# Patient Record
Sex: FEMALE | Race: WHITE | NOT HISPANIC OR LATINO | Employment: FULL TIME | URBAN - METROPOLITAN AREA
[De-identification: names, ages, dates, MRNs, and addresses within clinical notes are randomized per-mention and may not be internally consistent; named-entity substitution may affect disease eponyms.]

---

## 2017-01-09 ENCOUNTER — TELEPHONE (OUTPATIENT)
Dept: FAMILY MEDICINE | Facility: OTHER | Age: 27
End: 2017-01-09

## 2017-02-13 ENCOUNTER — ALLIED HEALTH/NURSE VISIT (OUTPATIENT)
Dept: FAMILY MEDICINE | Facility: OTHER | Age: 27
End: 2017-02-13
Payer: COMMERCIAL

## 2017-02-13 DIAGNOSIS — N91.2 ABSENCE OF MENSTRUATION: Primary | ICD-10-CM

## 2017-02-13 LAB — BETA HCG QUAL IFA URINE: POSITIVE

## 2017-02-13 PROCEDURE — 99207 ZZC NO CHARGE NURSE ONLY: CPT

## 2017-02-13 PROCEDURE — 84703 CHORIONIC GONADOTROPIN ASSAY: CPT | Performed by: PHYSICIAN ASSISTANT

## 2017-02-13 NOTE — MR AVS SNAPSHOT
"              After Visit Summary   2/13/2017    Valarie Talavera    MRN: [id number]           Patient Information     Date Of Birth          1990        Visit Information        Provider Department      2/13/2017 11:00 AM NL RN [facility]        Today's Diagnoses     Absence of menstruation    -  1      Care Instructions    According to your last menstrual period, you are approximately 4 weeks and 5 days pregnant.  Your estimated due date is 10/18/17.    To do list:  1. If you have not already started taking a prenatal vitamin, please start today.  2. Visit with our OB Intake nurse, Sole: this can be scheduled any time between today and the first visit with your provider. We do require that you see OB intake before your \"first OB\" visit if you choose to deliver at [facility].          Mondays- [place] (11am-6pm)       Tuesdays- [place] (9am-11am)/ [place] (1pm-3pm)       Wednesdays- [place] (9am-2pm)       Thursdays- [place] (8am-10am)    3. The first OB visit with provider of choice is to scheduled between 10 and 12 weeks: .  To make these appointments, or if you have any questions and would like to speak to your care team, you can call the clinic's main phone number:       [facility]: [phone]       [facility]: [phone]       [facility]: [phone]       [facility]: [phone]       [facility]: [phone]    Please remember, we would like to hear from you if you have any vaginal bleeding or any moderate to severe abdominal pain/cramping. You can call any of the phone numbers above and speak with an RN promptly, even if it is after clinic hours, someone will answer your call. You can also call the numbers listed in your take home folder from today's visit.     Thank you for choosing [facility], and Congratulations!    Rodney Manriquez RN                  Follow-ups after your visit        Who to contact     If you have questions " "or need follow up information about today's clinic visit or your schedule please contact [facility] directly at [phone].  Normal or non-critical lab and imaging results will be communicated to you by MyChart, letter or phone within 4 business days after the clinic has received the results. If you do not hear from us within 7 days, please contact the clinic through [employer] or phone. If you have a critical or abnormal lab result, we will notify you by phone as soon as possible.  Submit refill requests through [employer] or call your pharmacy and they will forward the refill request to us. Please allow 3 business days for your refill to be completed.          Additional Information About Your Visit        [employer] Information     [employer] lets you send messages to your doctor, view your test results, renew your prescriptions, schedule appointments and more. To sign up, go to [email or web address] . Click on \"Log in\" on the left side of the screen, which will take you to the Welcome page. Then click on \"Sign up Now\" on the right side of the page.     You will be asked to enter the access code listed below, as well as some personal information. Please follow the directions to create your username and password.     Your access code is: [email or web address]  Expires: 2017 11:19 AM     Your access code will  in 90 days. If you need help or a new code, please call your [place] clinic or [phone].        Care EveryWhere ID     This is your Care EveryWhere ID. This could be used by other organizations to access your [place] medical records  [id number]        Your Vitals Were     Last Period Breastfeeding?                2017 (Exact Date) No           Blood Pressure from Last 3 Encounters:   16 102/62   16 112/56   16 116/68    Weight from Last 3 Encounters:   16 231 lb 14.4 oz (105.2 kg)   16 224 lb 6.4 oz (101.8 kg)   16 220 lb (99.8 kg)              We " Performed the Following     Beta HCG qual IFA urine        Primary Care Provider    [facility]       No address on file        Thank you!     Thank you for choosing [facility]  for your care. Our goal is always to provide you with excellent care. Hearing back from our patients is one way we can continue to improve our services. Please take a few minutes to complete the written survey that you may receive in the mail after your visit with us. Thank you!             Your Updated Medication List - Protect others around you: Learn how to safely use, store and throw away your medicines at www.disposemymeds.org.          This list is accurate as of: 2/13/17 11:19 AM.  Always use your most recent med list.                   Brand Name Dispense Instructions for use    VITAMIN D PO      Take  by mouth.

## 2017-02-13 NOTE — PATIENT INSTRUCTIONS
"According to your last menstrual period, you are approximately 4 weeks and 5 days pregnant.  Your estimated due date is 10/18/17.    To do list:  1. If you have not already started taking a prenatal vitamin, please start today.  2. Visit with our OB Intake nurse, Sole: this can be scheduled any time between today and the first visit with your provider. We do require that you see OB intake before your \"first OB\" visit if you choose to deliver at [facility].          Mondays- [place] (11am-6pm)       Tuesdays- [place] (9am-11am)/ [place] (1pm-3pm)       Wednesdays- [place] (9am-2pm)       Thursdays- [place] (8am-10am)    3. The first OB visit with provider of choice is to scheduled between 10 and 12 weeks: .  To make these appointments, or if you have any questions and would like to speak to your care team, you can call the clinic's main phone number:       [facility]: [phone]       [facility]: [phone]       [facility]: [phone]       [facility]: [phone]       [facility]: [phone]    Please remember, we would like to hear from you if you have any vaginal bleeding or any moderate to severe abdominal pain/cramping. You can call any of the phone numbers above and speak with an RN promptly, even if it is after clinic hours, someone will answer your call. You can also call the numbers listed in your take home folder from today's visit.     Thank you for choosing [facility], and Congratulations!    Rodney Manriquez RN            "

## 2017-02-13 NOTE — PROGRESS NOTES
Valarie Talavera is a 26 year old here today for a pregnancy test.  LMP: Patient's last menstrual period was 2017 (exact date).  Wt: 0 lbs 0 oz.    Symptoms include weight gain, breast tenderness, absence of menses, nausea &/or vomiting and fatigue.    Valarie informed of positive pregnancy test results. JACINDA: 10/18/17    Educational advice given: nutrition, smoking and drugs & alcohol.    Current medications reviewed: Yes    Previous pregnancy history remarkable for: none.    Plan: schedule appointment with OB Educator and/or OB class, follow-up appointment with  of choice for pre- care, take multivitamin or pre-vicente vitamins and OB Education packet given.    She is to call back if she has any questions or concerns.  She is advised to notify a provider immediately if she experiences any severe cramping or abdominal pain or any vaginal bleeding.

## 2017-03-08 ENCOUNTER — HOSPITAL ENCOUNTER (EMERGENCY)
Facility: CLINIC | Age: 27
Discharge: HOME | End: 2017-03-09
Attending: PHYSICIAN ASSISTANT | Admitting: PHYSICIAN ASSISTANT
Payer: OTHER MISCELLANEOUS

## 2017-03-08 DIAGNOSIS — S33.5XXA LUMBAR SPRAIN, INITIAL ENCOUNTER: ICD-10-CM

## 2017-03-08 DIAGNOSIS — Z33.1 PREGNANT STATE, INCIDENTAL: ICD-10-CM

## 2017-03-08 DIAGNOSIS — M25.561 ACUTE PAIN OF RIGHT KNEE: ICD-10-CM

## 2017-03-08 PROCEDURE — 99282 EMERGENCY DEPT VISIT SF MDM: CPT

## 2017-03-08 PROCEDURE — 99282 EMERGENCY DEPT VISIT SF MDM: CPT | Performed by: PHYSICIAN ASSISTANT

## 2017-03-08 NOTE — LETTER
REPORT OF WORKABILITY    [facility] EMERGENCY DEPARTMENT  [address] [place] [address]  Phone: [phone]  Fax: [phone]      Employee Name: Valarie Talavera      : 1990     #: [id number]    Work related injury: Yes  Employer at time of injury: [employer]  Employer contact & phone: Meghna [phone]  Employed elsewhere? No  Workers' Compensation Carrier/Managed Care Plan:  Uncertain.    Today's date: 3/8/2017  Date of injury: 3/8/2017   Date of first visit: 3/8/2017     Provider Evaluation: Please fill in as needed.  Please give copy to employee for employer.    1. Diagnosis: Lumbar sprain, right knee pain, pregnancy.    2. Treatment: Rest, ice to the back area as needed, general stretching, work restrictions.    3. Medication: OTC Tylenol as needed.    NOTE: When ordering a medication, [place] Rules require Work Comp or WC on prescriptions.    4. No work from - void.    5. Return to work date: 3/9/2017       WITH RESTRICTIONS? Yes, with work restrictions: * Restricted lifting - Maximum lift in pounds: 5  DURATION OF LIMITATIONS: 2 days.      RESTRICTIONS: Unlimited unless listed.  Restrictions apply to home and leisure also.  If work restrictions is not available, the employee is totally disabled.    Next appointment: 2 day.    Maximum Medical Improvement (Date): uncertain.  Any Permanent Partial Disability? Deferred to future exam/consult.  Provider comments: She is pregnant, but does have fetal cardiac activity noted during bedside US.    Medical Examiner Name:  Pérez Martinez PA-C   License or registration: [place] [id number]

## 2017-03-08 NOTE — ED AVS SNAPSHOT
[facility] Emergency Department    [address] [place] [address]    Phone:  [phone]    Fax:  [phone]                                       Valarie Talavera   MRN: [id number]    Department:  [facility] Emergency Department   Date of Visit:  3/8/2017           Patient Information     Date Of Birth          1990        Your diagnoses for this visit were:     Lumbar sprain, initial encounter     Acute pain of right knee     Pregnant state, incidental        You were seen by Pérez Martinez PA-C.      Follow-up Information     Follow up with Luis Crisostomo MD In 1 day.    Specialty:  Family Practice    Why:  For low back pain recheck    Contact information:    [facility]  [address] [place] [id number] [phone]          Discharge Instructions       1. Please rest and follow your work restrictions.  2. Please follow the 'pelvic rest' restrictions for the next 48 hours.  This means that you should not put anything in the vagina during this time.  No intercourse.  3.  It is okay to take Tylenol 500-1000 mg every 6 hours as needed for discomfort.   4.  Ice your back for 15 minutes every 1-2 hours as needed.   5.  Please see Dr. Crisostomo for a recheck visit this Friday at 10:15 AM in the [facility].    Future Appointments        Provider Department Dept Phone Center    3/10/2017 10:15 AM Luis Crisostomo MD, MD [facility] [phone] [place]      24 Hour Appointment Hotline       To make an appointment at any [facility], call [phone] ([phone]). If you don't have a family doctor or clinic, we will help you find one. [facility] are conveniently located to serve the needs of you and your family.             Review of your medicines      Our records show that you are taking the medicines listed below. If these are incorrect, please call your family doctor or clinic.        Dose / Directions Last  "dose taken    PRENATAL VITAMINS PO   Dose:  1 tablet        Take 1 tablet by mouth daily   Refills:  0        VITAMIN D PO   Dose:  500 Units        Take 500 Units by mouth 2 times daily   Refills:  0                Orders Needing Specimen Collection     None      Pending Results     No orders found for last 3 day(s).            Pending Culture Results     No orders found for last 3 day(s).            Thank you for choosing [facility]       Thank you for choosing [facility] for your care. Our goal is always to provide you with excellent care. Hearing back from our patients is one way we can continue to improve our services. Please take a few minutes to complete the written survey that you may receive in the mail after you visit with us. Thank you!        [employer] Information     [employer] lets you send messages to your doctor, view your test results, renew your prescriptions, schedule appointments and more. To sign up, go to [email or web address] . Click on \"Log in\" on the left side of the screen, which will take you to the Welcome page. Then click on \"Sign up Now\" on the right side of the page.     You will be asked to enter the access code listed below, as well as some personal information. Please follow the directions to create your username and password.     Your access code is: [email or web address]  Expires: 2017 11:19 AM     Your access code will  in 90 days. If you need help or a new code, please call your [place] clinic or [phone].        Care EveryWhere ID     This is your Care EveryWhere ID. This could be used by other organizations to access your [facility] medical records  [id number]        After Visit Summary       This is your record. Keep this with you and show to your community pharmacist(s) and doctor(s) at your next visit.                  "

## 2017-03-08 NOTE — ED AVS SNAPSHOT
[facility] Emergency Department    [address] [place] [address]    Phone:  [phone]    Fax:  [phone]                                       Valarie Talavera   MRN: [id number]    Department:  [facility] Emergency Department   Date of Visit:  3/8/2017           After Visit Summary Signature Page     I have received my discharge instructions, and my questions have been answered. I have discussed any challenges I see with this plan with the nurse or doctor.    ..........................................................................................................................................  Patient/Patient Representative Signature      ..........................................................................................................................................  Patient Representative Print Name and Relationship to Patient    ..................................................               ................................................  Date                                            Time    ..........................................................................................................................................  Reviewed by Signature/Title    ...................................................              ..............................................  Date                                                            Time

## 2017-03-09 VITALS
HEIGHT: 68 IN | WEIGHT: 246 LBS | TEMPERATURE: 97.8 F | DIASTOLIC BLOOD PRESSURE: 67 MMHG | RESPIRATION RATE: 18 BRPM | HEART RATE: 74 BPM | BODY MASS INDEX: 37.28 KG/M2 | OXYGEN SATURATION: 99 % | SYSTOLIC BLOOD PRESSURE: 132 MMHG

## 2017-03-09 NOTE — ED PROVIDER NOTES
"  History     Chief Complaint   Patient presents with     Back Pain     HPI  Valarie Talavera is a 26 year old female who presents for evaluation of a Worker's Compensation injury that occurred at 2200 this evening. 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 He flung his body against her which caused both of them to fall backward onto the tile floor. She fell onto her tailbone and shoulder per her report. She did notice low back and right knee discomfort after the incident. Since then she has had some mild lower abdominal discomfort as well. No cramping or contractions. No vaginal bleeding. She does note that she is currently 8 weeks and 1 day gestation pregnant based on LMP. She has had no complications of pregnancy up to this point. She reports pain in her low back as 7 on a scale of 10 when she is moving. Otherwise, she states that it \"comes and goes \". She has some radiation into the bilateral buttock area. No lower extremity numbness or tingling. No lower extremity weakness. No loss of bowel/bladder function. She has never had a significant low back injury in the past.      Patient's last menstrual period was 2017 (exact date).     Obstetric History       T1      TAB0   SAB1   E0   M0   L1       # Outcome Date GA Lbr Mook/2nd Weight Sex Delivery Anes PTL Lv   3 Current            2 SAB 02/02/15 6w0d          1 Term 14 40w0d  3.742 kg (8 lb 4 oz) F   N Y               I have reviewed the Medications, Allergies, Past Medical and Surgical History, and Social History in the [employer] system.    Past Medical History   Diagnosis Date     Anxiety      Depression with anxiety      Depressive disorder      HSIL on Pap smear of cervix 2016     colp scheduled 16     Low blood pressure      Vitamin D deficiency         Patient Active Problem List   Diagnosis     IUD (intrauterine device) in place     Vitamin D deficiency " "    HSIL on Pap smear of cervix        Past Surgical History   Procedure Laterality Date     No history of surgery          No current facility-administered medications for this encounter.      Current Outpatient Prescriptions   Medication     Prenatal Multivit-Min-Fe-FA (PRENATAL VITAMINS PO)     Cholecalciferol (VITAMIN D PO)           Allergies   Allergen Reactions     Zithromax [Azithromycin] Other (See Comments)     Seizure         Review of Systems   All other systems reviewed and are negative.      Physical Exam   BP: 132/67  Pulse: 96  Temp: 100  F (37.8  C)  Resp: 20  Height: 172.7 cm (5' 8\")  Weight: 111.6 kg (246 lb)  SpO2: 97 %  Physical Exam  Generally healthy appearing female in NAD who is active and non-toxic appearing.   Skin:  No rashes or lesions are noted on inspection of the torso, face, and upper extremities.   Heart:  RRR with normal S1 and S2.  No S3 or S4.  No murmur, rub, gallop, or click.  PMI is nondisplaced.   Lungs:  CTA bilaterally without wheezes, rales, or rhonchi.  Good breath sounds heard throughout all lung fields.  Tympanitic to percussion with no areas of dullness.   Chest :  No chest wall deformities or tenderness.   Abdomen: Positive bowel sounds in all four quadrants.  Mild tenderness to palpation over the suprapubic area.  No rebound and no guarding.  No hepatosplenomegaly.  No masses.   Lumbosacral spine area reveals no mass but there is tenderness of the paravertebral muscles. Full but painful lumbosacral range of motion is noted. Straight leg raise is negative at 70 degrees on both sides. DTR's, motor strength and sensation normal, including heel and toe gait.  Peripheral pulses are palpable. Hips have full range of motion without pain.   Knee is normal to inspection and palpation without evidence of erythema, warmth, or discoloration. ROM is full without crepitus.  Strength is equal and appropriate bilaterally. No tenderness to palpation along the joint line, posterior " knee, patella, and collateral ligaments.  No ligamentous instability with testing of the MCL, LCL, ACL, and PCL ligaments.  Gage's testing for meniscal injury is negative.  Patellar apprehension negative.    Distal pulses 2+ bilaterally.  Sensation intact to light touch.  Opposite knee exam is normal.       Given her early gestation, we would not be able to hear fetal heart tones by Doptone. I did perform an informal bedside ultrasound with the transabdominal approach. I was able to appreciate cardiac activity at about 140 beats per minutes. This was reassuring.       ED Course     ED Course     Procedures             Critical Care time:  none               Labs Ordered and Resulted from Time of ED Arrival Up to the Time of Departure from the ED - No data to display    Assessments & Plan (with Medical Decision Making)     Lumbar sprain, initial encounter  Acute pain of right knee  Pregnant state, incidental     26 year old female currently 8 weeks 1 day gestation pregnant based on LMP presents for evaluation of a Worker's Compensation injury that occurred this evening just prior to level. She was helping a resident when she had acute onset seizure. He fell against the patient, and they fell backwards onto the floor with him landing on top of her.  She primarily noted low back discomfort and right knee discomfort immediately afterwards. She has had some off and on discomfort in the lower abdomen as well. No vaginal bleeding. Exam shows significant lower back discomfort upon palpation.  Knee exam was essentially normal. Sodium minor lower abdominal discomfort. Bedside ultrasound with noted fetal heart tones which was reassuring as noted above. Given that she does not have any current contractions, does not have vaginal bleeding, and she is so early in her pregnancy, formal ultrasound was not warranted at this time.   We did discuss that there is the potential for SAB with any lower abdominal trauma. We discussed  pelvic rest and observation for further symptoms.  She was comfortable with this.  We did not monitor her fetal heart tones for an extended period of time given her early gestational age and inability to find fetal heart tones by Doptone. I placed her on work restrictions of no lifting over 5 pounds. Recheck appointment with her PCP and OB MAleksD., Dr. Crisostomo, in the [facility] was set up for her 1.5 days from now. She should return to the ED prior to then if her symptoms are worsening or changing, especially if she has any vaginal bleeding. It is okay to take Tylenol as needed for discomfort. Increase clear fluids.  The patient was in agreement with this plan and was suitable for discharge. Please see the work ability letter for further details.      I have reviewed the nursing notes.    I have reviewed the findings, diagnosis, plan and need for follow up with the patient.    Discharge Medication List as of 3/9/2017 12:38 AM          Final diagnoses:   Lumbar sprain, initial encounter   Acute pain of right knee   Pregnant state, incidental       Disclaimer: This note consists of symbols derived from keyboarding, dictation and/or voice recognition software. As a result, there may be errors in the script that have gone undetected. Please consider this when interpreting information found in this chart.    3/8/2017   Pérez Martinez PA-C   [facility] EMERGENCY DEPARTMENT     Pérez Martinez PA-C  03/09/17 0100

## 2017-03-09 NOTE — DISCHARGE INSTRUCTIONS
1. Please rest and follow your work restrictions.  2. Please follow the 'pelvic rest' restrictions for the next 48 hours.  This means that you should not put anything in the vagina during this time.  No intercourse.  3.  It is okay to take Tylenol 500-1000 mg every 6 hours as needed for discomfort.   4.  Ice your back for 15 minutes every 1-2 hours as needed.   5.  Please see Dr. Crisostomo for a recheck visit this Friday at 10:15 AM in the [facility].

## 2017-03-09 NOTE — PROGRESS NOTES
SUBJECTIVE:                                                    Valarie Talavera is a 26 year old female who presents to clinic today for the following health issues:      ED/UC Followup:    Facility:  [facility]  Date of visit: 3/8/17  Reason for visit: Lumbar sprain, acute pain of right knee, pregnant state  Current Status: more abdominal now not so much low back, pt worried about infection     Pt here for ER f/u.  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  She's nearly 9 weeks pregnant.  Everything looked OK at the ER.      Pt has had a reddish-brown discharge since her ER visit.  Cramping pain as well.  Denies bleeding.  Some itching as well.      States that her back and knee pain are improved.        Problem list and histories reviewed & adjusted, as indicated.  Additional history: as documented    Current Outpatient Prescriptions   Medication Sig Dispense Refill     Prenatal Multivit-Min-Fe-FA (PRENATAL VITAMINS PO) Take 1 tablet by mouth daily       Cholecalciferol (VITAMIN D PO) Take 500 Units by mouth 2 times daily        Recent Labs   Lab Test  08/30/16   1153  02/02/16   1345  07/24/15   1801   LDL   --   74   --    HDL   --   29*   --    TRIG   --   233*   --    ALT   --    --   22   CR   --    --   0.62   GFRESTIMATED   --    --   >90  Non  GFR Calc     GFRESTBLACK   --    --   >90   GFR Calc     POTASSIUM   --    --   3.7   TSH  0.46   --    --       BP Readings from Last 3 Encounters:   03/10/17 100/56   03/08/17 132/67   12/30/16 102/62    Wt Readings from Last 3 Encounters:   03/10/17 242 lb 6.4 oz (110 kg)   03/08/17 246 lb (111.6 kg)   12/30/16 231 lb 14.4 oz (105.2 kg)                 Reviewed and updated as needed this visit by clinical staff       Reviewed and updated as needed this visit by Provider         ROS:  Constitutional, HEENT, cardiovascular, pulmonary, gi and gu systems are negative, except as otherwise noted.  " Feels warm, c/w pregnancy.  Not much nausea any longer.  Still with breast tenderness, bloating.      OBJECTIVE:                                                    /56 (BP Location: Left arm, Patient Position: Chair, Cuff Size: Adult Large)  Pulse 96  Temp 97.9  F (36.6  C) (Temporal)  Resp 16  Ht 5' 7\" (1.702 m)  Wt 242 lb 6.4 oz (110 kg)  LMP 01/11/2017 (Exact Date)  BMI 37.97 kg/m2  Body mass index is 37.97 kg/(m^2).  GENERAL: healthy, alert and no distress  NECK: no adenopathy, no asymmetry, masses, or scars and thyroid normal to palpation  ABDOMEN: soft, nontender, no hepatosplenomegaly, no masses and bowel sounds normal   (female): normal female external genitalia, normal urethral meatus , vaginal mucosa pink, moist, well rugated, vaginal discharge - moderate, yellow, thin and malodorous and normal cervix, adnexae, and uterus without masses.  MS: no gross musculoskeletal defects noted, no edema    Diagnostic Test Results:  Results for orders placed or performed in visit on 03/10/17   *UA reflex to Microscopic and Culture ([facility] (except [place] and [place])   Result Value Ref Range    Color Urine Yellow     Appearance Urine Slightly Cloudy     Glucose Urine Negative NEG mg/dL    Bilirubin Urine Negative NEG    Ketones Urine Negative NEG mg/dL    Specific Gravity Urine 1.025 1.003 - 1.035    Blood Urine Trace (A) NEG    pH Urine 6.0 5.0 - 7.0 pH    Protein Albumin Urine Negative NEG mg/dL    Urobilinogen Urine 0.2 0.2 - 1.0 EU/dL    Nitrite Urine Negative NEG    Leukocyte Esterase Urine Negative NEG    Source Midstream Urine    Urine Microscopic   Result Value Ref Range    WBC Urine 2-5 (A) 0 - 2 /HPF    RBC Urine O - 2 0 - 2 /HPF    Squamous Epithelial /LPF Urine Many (A) FEW /LPF    Bacteria Urine Many (A) NEG /HPF    Mucous Urine Present (A) NEG /LPF   Wet prep   Result Value Ref Range    Specimen Description Vagina     Wet Prep (A)      Clue cells seen  No " Trichomonas seen  No yeast seen      Micro Report Status FINAL 03/10/2017         ASSESSMENT/PLAN:                                                        ICD-10-CM    1. Back pain affecting pregnancy in first trimester O26.891     M54.9    2. Vaginal discharge N89.8 Wet prep     *UA reflex to Microscopic and Culture ([facility] (except [place] and [place])     Urine Microscopic   3. BV (bacterial vaginosis) N76.0 clindamycin (CLEOCIN) 2 % cream    B96.89    4. Pelvic cramping in antepartum period O26.899 Wet prep    R10.2 *UA reflex to Microscopic and Culture ([facility] (except [place] and [place])   5. Encounter for supervision of other normal pregnancy in first trimester Z34.81 *UA reflex to Microscopic and Culture ([facility] (except [place] and [place])   6. Screening for malignant neoplasm of cervix Z12.4 Pap imaged thin layer screen reflex to HPV if ASCUS - recommend age 25 - 29     1.  This has basically resolved.  Reassured pt regarding this.  No e/o miscarriage at this time.  Continue supportive care.  Didn't require analgesics or anything else right now.  2,3.  Will treat BV (treating vaginally due to potential risk of flagyl in first trimester).  Watch for any worsening, bleeding, etc.  4.  Likely secondary to BV.  Encouraged fluid, watch for e/o threatening miscarriage.  Bedside u/s showed intrauterine pregnancy today.  5.  Encouraged pt to get in for formal OB intake.  Will get additional testing at that time.  6.  Pap obtained.  Await results.          Patient Instructions   Thank you for visiting [facility]    Take the vaginal cream for your infection.  Let me know if not improving.    If you have bleeding, let me know.    See OB intake to formally start your OB care here.    We'll let you know your pap results as soon as we can.     Please see me in 1-4 weeks for OB visit.       If you had  imaging scheduled please refer to your radiology prep sheet.    Appointment    Date_______________     Time_____________    Day:   M TU W TH F    With____________________________    Location_________________________    If you need medication refills, please contact your pharmacy 3 days before your prescriptions runs out. If you are out of refills, your pharmacy will contact contact the clinic.    Contact us or return if questions or concerns.     -Your Care Team:  MD Shi Frazier PA-C Folake Falaki, MD Anoshirvan Mazhari, MD Kelly White, JACKY    General information about your clinic      Clinic hours:     Lab hours:  Phone [phone]  Monday 7:30 am-7 pm    Monday 8:30 am-6:30 pm  Tuesday-Friday 7:30 am-5 pm   Tuesday-Friday 8:30 am-4:30 pm    Pharmacy hours:  Phone [phone]  Monday 8:30 am-7pm  Tuesday-Friday 8:30am-6 pm                                       Mychart assistance [phone]        We would like to hear from you, how was your visit today?    Daisy Vazquez  Patient Information Supervisor   Patient Care Supervisor  [facility], and [facility]  [phone]        Luis Crisostomo MD, MD  [facility]

## 2017-03-09 NOTE — ED NOTES
Patient c/o low back pain and right knee pain. 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 She is 8 weeks pregnant.

## 2017-03-10 ENCOUNTER — OFFICE VISIT (OUTPATIENT)
Dept: FAMILY MEDICINE | Facility: OTHER | Age: 27
End: 2017-03-10
Payer: COMMERCIAL

## 2017-03-10 VITALS
SYSTOLIC BLOOD PRESSURE: 100 MMHG | RESPIRATION RATE: 16 BRPM | WEIGHT: 242.4 LBS | TEMPERATURE: 97.9 F | BODY MASS INDEX: 38.04 KG/M2 | HEART RATE: 96 BPM | DIASTOLIC BLOOD PRESSURE: 56 MMHG | HEIGHT: 67 IN

## 2017-03-10 DIAGNOSIS — Z12.4 SCREENING FOR MALIGNANT NEOPLASM OF CERVIX: ICD-10-CM

## 2017-03-10 DIAGNOSIS — N89.8 VAGINAL DISCHARGE: ICD-10-CM

## 2017-03-10 DIAGNOSIS — R10.2 PELVIC CRAMPING IN ANTEPARTUM PERIOD: ICD-10-CM

## 2017-03-10 DIAGNOSIS — O26.899 PELVIC CRAMPING IN ANTEPARTUM PERIOD: ICD-10-CM

## 2017-03-10 DIAGNOSIS — M54.9 BACK PAIN AFFECTING PREGNANCY IN FIRST TRIMESTER: Primary | ICD-10-CM

## 2017-03-10 DIAGNOSIS — B96.89 BV (BACTERIAL VAGINOSIS): ICD-10-CM

## 2017-03-10 DIAGNOSIS — O99.891 BACK PAIN AFFECTING PREGNANCY IN FIRST TRIMESTER: Primary | ICD-10-CM

## 2017-03-10 DIAGNOSIS — Z34.81 ENCOUNTER FOR SUPERVISION OF OTHER NORMAL PREGNANCY IN FIRST TRIMESTER: ICD-10-CM

## 2017-03-10 DIAGNOSIS — N76.0 BV (BACTERIAL VAGINOSIS): ICD-10-CM

## 2017-03-10 LAB
ALBUMIN UR-MCNC: NEGATIVE MG/DL
APPEARANCE UR: ABNORMAL
BACTERIA #/AREA URNS HPF: ABNORMAL /HPF
BILIRUB UR QL STRIP: NEGATIVE
COLOR UR AUTO: YELLOW
GLUCOSE UR STRIP-MCNC: NEGATIVE MG/DL
HGB UR QL STRIP: ABNORMAL
KETONES UR STRIP-MCNC: NEGATIVE MG/DL
LEUKOCYTE ESTERASE UR QL STRIP: NEGATIVE
MICRO REPORT STATUS: ABNORMAL
MUCOUS THREADS #/AREA URNS LPF: PRESENT /LPF
NITRATE UR QL: NEGATIVE
NON-SQ EPI CELLS #/AREA URNS LPF: ABNORMAL /LPF
PH UR STRIP: 6 PH (ref 5–7)
RBC #/AREA URNS AUTO: ABNORMAL /HPF (ref 0–2)
SP GR UR STRIP: 1.02 (ref 1–1.03)
SPECIMEN SOURCE: ABNORMAL
URN SPEC COLLECT METH UR: ABNORMAL
UROBILINOGEN UR STRIP-ACNC: 0.2 EU/DL (ref 0.2–1)
WBC #/AREA URNS AUTO: ABNORMAL /HPF (ref 0–2)
WET PREP SPEC: ABNORMAL

## 2017-03-10 PROCEDURE — G0145 SCR C/V CYTO,THINLAYER,RESCR: HCPCS | Performed by: FAMILY MEDICINE

## 2017-03-10 PROCEDURE — 87624 HPV HI-RISK TYP POOLED RSLT: CPT | Performed by: FAMILY MEDICINE

## 2017-03-10 PROCEDURE — 87210 SMEAR WET MOUNT SALINE/INK: CPT | Performed by: FAMILY MEDICINE

## 2017-03-10 PROCEDURE — 81001 URINALYSIS AUTO W/SCOPE: CPT | Performed by: FAMILY MEDICINE

## 2017-03-10 PROCEDURE — 99214 OFFICE O/P EST MOD 30 MIN: CPT | Performed by: FAMILY MEDICINE

## 2017-03-10 PROCEDURE — G0124 SCREEN C/V THIN LAYER BY MD: HCPCS | Performed by: FAMILY MEDICINE

## 2017-03-10 RX ORDER — CLINDAMYCIN PHOSPHATE 20 MG/G
1 CREAM VAGINAL AT BEDTIME
Qty: 40 G | Refills: 0 | Status: SHIPPED | OUTPATIENT
Start: 2017-03-10 | End: 2017-03-17

## 2017-03-10 ASSESSMENT — PAIN SCALES - GENERAL: PAINLEVEL: MODERATE PAIN (5)

## 2017-03-10 NOTE — LETTER
December 13, 2017    Valarie Talavera  [address] [place] [address]    Dear ,     You recently requested your PAP smear results as well as some information on the FELIBERTO program. The result shows ASCUS or Atypical Squamous Cells of Undetermined Significance. This indicates a mild change only    We also tested your sample for the presence of the HPV (Human Papillomavirus). Your sample was positive for HPV. There are many types of HPV, but we test pap samples for the high risk types. HPV can be the cause of an abnormal Pap smear result.  The high risk types of HPV can also be related to the potential development of cervical cancer if not monitored and/or treated appropriately    Because of this, we need to do further testing which was already discussed with you.  It was recommended that you schedule a colposcopy. Colposcopy is a way for your doctor to use a special magnifying device to look at your vulva, vagina, and cervix. If a problem is seen during colposcopy, a small sample of tissue may be collected for laboratory testing (biopsy). The exam and test will help determine the reason for your abnormal pap smear.    We understand that with limited or no insurance this can be difficult to pay for so we are including information on the FELIBERTO Program.  The Feliberto Screening Programs help keep [facility] healthy through screening and early detection of breast, cervical and colorectal cancers. Screenings are provided at participating locations free of charge to people who qualify.    Please contact them at [phone] or apply on their website,   [email or web address]/divs/healthimprovement/working-together/who-we-are/feliberto.html    Schedule this for a time when you are not due to have a period (if having regular menstrual bleeding). You can take an over the counter pain reliever 1 hour before your colposcopy. Nothing in the vagina for 24 hours before your colposcopy (no sex, douches, vaginal  medications or lubricants).     If you have additional questions regarding this result, please call the Pap nurse at [phone].     Sincerely,  Luis Crisostomo MD/aileen

## 2017-03-10 NOTE — PATIENT INSTRUCTIONS
Thank you for visiting [facility]    Take the vaginal cream for your infection.  Let me know if not improving.    If you have bleeding, let me know.    See OB intake to formally start your OB care here.    We'll let you know your pap results as soon as we can.     Please see me in 1-4 weeks for OB visit.       If you had imaging scheduled please refer to your radiology prep sheet.    Appointment    Date_______________     Time_____________    Day:   M TU W TH F    With____________________________    Location_________________________    If you need medication refills, please contact your pharmacy 3 days before your prescriptions runs out. If you are out of refills, your pharmacy will contact contact the clinic.    Contact us or return if questions or concerns.     -Your Care Team:  MD Shi Frazier PA-C Folake Falaki, MD Anoshirvan Mazhari, MD Kelly White, JACKY    General information about your clinic      Clinic hours:     Lab hours:  Phone [phone]  Monday 7:30 am-7 pm    Monday 8:30 am-6:30 pm  Tuesday-Friday 7:30 am-5 pm   Tuesday-Friday 8:30 am-4:30 pm    Pharmacy hours:  Phone [phone]  Monday 8:30 am-7pm  Tuesday-Friday 8:30am-6 pm                                       Mychart assistance [phone]        We would like to hear from you, how was your visit today?    Daisy Vazquez  Patient Information Supervisor   Patient Care Supervisor  [facility] [place], and [facility], and [facility]  [phone]

## 2017-03-10 NOTE — LETTER
November 1, 2017      Valarie ABERNATHY Sadaf  [address] [facility] [address]    Dear MsCindySadaf,      At [facility], your health and wellness is our primary concern. That is why we are following up on an abnormal pap from 3/10/17, which was reported as ASCUS pap with positive HPV. Dr. Luna had recommended that you have a Colposcopy completed after your postpartum check.     It is important to complete the follow up that your provider has suggested for you to ensure that there are no worsening changes which may, over time, develop into cancer.      Please call [phone] for [place] or [phone] for [place] to schedule an appointment for a Colposcopyat your earliest convenience. If you have questions or concerns, please call the clinic and we will be happy to assist you.    Thank you for choosing [facility]!    Sincerely,      Katerin Wall, BSN, RN, Pap Tracking Nurse   Working with Dr. Luna and Dr. Crisostomo

## 2017-03-10 NOTE — MR AVS SNAPSHOT
After Visit Summary   3/10/2017    Valarie Talavera    MRN: [id number]           Patient Information     Date Of Birth          1990        Visit Information        Provider Department      3/10/2017 10:15 AM Luis Crisostomo MD [facility]        Today's Diagnoses     Vaginal discharge    -  1    Screening for malignant neoplasm of cervix        Need for HPV vaccine        Pelvic cramping in antepartum period        Encounter for supervision of other normal pregnancy in first trimester        BV (bacterial vaginosis)          Care Instructions    Thank you for visiting [facility]    Take the vaginal cream for your infection.  Let me know if not improving.    If you have bleeding, let me know.    See OB intake to formally start your OB care here.    We'll let you know your pap results as soon as we can.     Please see me in 1-4 weeks for OB visit.       If you had imaging scheduled please refer to your radiology prep sheet.    Appointment    Date_______________     Time_____________    Day:   M TU W TH F    With____________________________    Location_________________________    If you need medication refills, please contact your pharmacy 3 days before your prescriptions runs out. If you are out of refills, your pharmacy will contact contact the clinic.    Contact us or return if questions or concerns.     -Your Care Team:  MD Shi Frazier PA-C Folake Falaki, MD Anoshirvan Mazhari, MD Kelly White, JACKY    General information about your clinic      Clinic hours:     Lab hours:  Phone [phone]  Monday 7:30 am-7 pm    Monday 8:30 am-6:30 pm  Tuesday-Friday 7:30 am-5 pm   Tuesday-Friday 8:30 am-4:30 pm    Pharmacy hours:  Phone [phone]  Monday 8:30 am-7pm  Tuesday-Friday 8:30am-6 pm                                       Mychart assistance [phone]        We would like to hear from you, how was your visit today?    Daisy  "Ollie Vazquez  Patient Information Supervisor   Patient Care Supervisor  [facility] [place], and [facility] [place], and [facility]  [phone]          Follow-ups after your visit        Who to contact     If you have questions or need follow up information about today's clinic visit or your schedule please contact [facility] directly at [phone].  Normal or non-critical lab and imaging results will be communicated to you by Droplethart, letter or phone within 4 business days after the clinic has received the results. If you do not hear from us within 7 days, please contact the clinic through Droplethart or phone. If you have a critical or abnormal lab result, we will notify you by phone as soon as possible.  Submit refill requests through cisimple or call your pharmacy and they will forward the refill request to us. Please allow 3 business days for your refill to be completed.          Additional Information About Your Visit        [facility] Information     cisimple lets you send messages to your doctor, view your test results, renew your prescriptions, schedule appointments and more. To sign up, go to [email or web address] . Click on \"Log in\" on the left side of the screen, which will take you to the Welcome page. Then click on \"Sign up Now\" on the right side of the page.     You will be asked to enter the access code listed below, as well as some personal information. Please follow the directions to create your username and password.     Your access code is: [email or web address]  Expires: 2017 11:19 AM     Your access code will  in 90 days. If you need help or a new code, please call your [facility] clinic or [phone].        Care EveryWhere ID     This is your Care EveryWhere ID. This could be used by other organizations to access your [facility] medical records  [id number]        Your Vitals Were     Pulse Temperature Respirations Height " "Last Period BMI (Body Mass Index)    96 97.9  F (36.6  C) (Temporal) 16 5' 7\" (1.702 m) 01/11/2017 (Exact Date) 37.97 kg/m2       Blood Pressure from Last 3 Encounters:   03/10/17 100/56   03/08/17 132/67   12/30/16 102/62    Weight from Last 3 Encounters:   03/10/17 242 lb 6.4 oz (110 kg)   03/08/17 246 lb (111.6 kg)   12/30/16 231 lb 14.4 oz (105.2 kg)              We Performed the Following     *UA reflex to Microscopic and Culture ([facility] [place] [facility] (except [place] and [place])     Pap imaged thin layer screen reflex to HPV if ASCUS - recommend age 25 - 29     Wet prep          Today's Medication Changes          These changes are accurate as of: 3/10/17 11:24 AM.  If you have any questions, ask your nurse or doctor.               Start taking these medicines.        Dose/Directions    clindamycin 2 % cream   Commonly known as:  CLEOCIN   Used for:  BV (bacterial vaginosis)   Started by:  Luis Crisostomo MD        Dose:  1 applicator   Place 1 applicator vaginally At Bedtime for 7 days   Quantity:  40 g   Refills:  0            Where to get your medicines      These medications were sent to [facility] - [address]   [address] [place] [address]     Phone:  [phone]     clindamycin 2 % cream                Primary Care Provider    [facility]       No address on file        Thank you!     Thank you for choosing [facility]  for your care. Our goal is always to provide you with excellent care. Hearing back from our patients is one way we can continue to improve our services. Please take a few minutes to complete the written survey that you may receive in the mail after your visit with us. Thank you!             Your Updated Medication List - Protect others around you: Learn how to safely use, store and throw away your medicines at www.disposemymeds.org.          This list is accurate as of: " 3/10/17 11:24 AM.  Always use your most recent med list.                   Brand Name Dispense Instructions for use    clindamycin 2 % cream    CLEOCIN    40 g    Place 1 applicator vaginally At Bedtime for 7 days       PRENATAL VITAMINS PO      Take 1 tablet by mouth daily       VITAMIN D PO      Take 500 Units by mouth 2 times daily

## 2017-03-10 NOTE — NURSING NOTE
"Chief Complaint   Patient presents with     ER F/U     Panel Management     hpv, flu shot, pap, colposcopy       Initial /56 (BP Location: Left arm, Patient Position: Chair, Cuff Size: Adult Large)  Pulse 96  Temp 97.9  F (36.6  C) (Temporal)  Resp 16  Ht 5' 7\" (1.702 m)  Wt 242 lb 6.4 oz (110 kg)  LMP 01/11/2017 (Exact Date)  BMI 37.97 kg/m2 Estimated body mass index is 37.97 kg/(m^2) as calculated from the following:    Height as of this encounter: 5' 7\" (1.702 m).    Weight as of this encounter: 242 lb 6.4 oz (110 kg).  Medication Reconciliation: fariha Larsen, CMA    "

## 2017-03-10 NOTE — LETTER
[facility]  [address] [place] [facility] [address]  Phone: [phone]    March 10, 2017        Valarie Talavera  [address] [place] [address]          To whom it may concern:    RE: Valarie ABERNATHY Sadaf    Patient was seen and treated today at our clinic and missed work.  Patient may return to work 3/10/2017 with the following:  No working or lifting restrictions    Please contact me for questions or concerns.      Sincerely,        Luis Crisostomo MD

## 2017-03-11 PROBLEM — Z34.81 ENCOUNTER FOR SUPERVISION OF OTHER NORMAL PREGNANCY IN FIRST TRIMESTER: Status: ACTIVE | Noted: 2017-03-11

## 2017-03-16 LAB
COPATH REPORT: ABNORMAL
PAP: ABNORMAL

## 2017-03-17 LAB
FINAL DIAGNOSIS: ABNORMAL
HPV HR 12 DNA CVX QL NAA+PROBE: POSITIVE
HPV16 DNA SPEC QL NAA+PROBE: NEGATIVE
HPV18 DNA SPEC QL NAA+PROBE: NEGATIVE
SPECIMEN DESCRIPTION: ABNORMAL

## 2017-03-22 ENCOUNTER — PRENATAL OFFICE VISIT (OUTPATIENT)
Dept: FAMILY MEDICINE | Facility: OTHER | Age: 27
End: 2017-03-22
Payer: COMMERCIAL

## 2017-03-22 ENCOUNTER — RADIANT APPOINTMENT (OUTPATIENT)
Dept: ULTRASOUND IMAGING | Facility: OTHER | Age: 27
End: 2017-03-22
Attending: FAMILY MEDICINE
Payer: COMMERCIAL

## 2017-03-22 VITALS — WEIGHT: 245 LBS | BODY MASS INDEX: 38.37 KG/M2

## 2017-03-22 DIAGNOSIS — Z34.80 OTHER NORMAL PREGNANCY, NOT FIRST: ICD-10-CM

## 2017-03-22 DIAGNOSIS — Z34.80 OTHER NORMAL PREGNANCY, NOT FIRST: Primary | ICD-10-CM

## 2017-03-22 LAB
ABO + RH BLD: NORMAL
ABO + RH BLD: NORMAL
BLD GP AB SCN SERPL QL: NORMAL
BLOOD BANK CMNT PATIENT-IMP: NORMAL
ERYTHROCYTE [DISTWIDTH] IN BLOOD BY AUTOMATED COUNT: 14.1 % (ref 10–15)
HCT VFR BLD AUTO: 36.1 % (ref 35–47)
HGB BLD-MCNC: 12.4 G/DL (ref 11.7–15.7)
MCH RBC QN AUTO: 31 PG (ref 26.5–33)
MCHC RBC AUTO-ENTMCNC: 34.3 G/DL (ref 31.5–36.5)
MCV RBC AUTO: 90 FL (ref 78–100)
PLATELET # BLD AUTO: 282 10E9/L (ref 150–450)
RBC # BLD AUTO: 4 10E12/L (ref 3.8–5.2)
SPECIMEN EXP DATE BLD: NORMAL
WBC # BLD AUTO: 13.1 10E9/L (ref 4–11)

## 2017-03-22 PROCEDURE — 86850 RBC ANTIBODY SCREEN: CPT | Performed by: FAMILY MEDICINE

## 2017-03-22 PROCEDURE — 86762 RUBELLA ANTIBODY: CPT | Performed by: FAMILY MEDICINE

## 2017-03-22 PROCEDURE — 87340 HEPATITIS B SURFACE AG IA: CPT | Performed by: FAMILY MEDICINE

## 2017-03-22 PROCEDURE — 76801 OB US < 14 WKS SINGLE FETUS: CPT

## 2017-03-22 PROCEDURE — 86780 TREPONEMA PALLIDUM: CPT | Performed by: FAMILY MEDICINE

## 2017-03-22 PROCEDURE — 87389 HIV-1 AG W/HIV-1&-2 AB AG IA: CPT | Performed by: FAMILY MEDICINE

## 2017-03-22 PROCEDURE — 86901 BLOOD TYPING SEROLOGIC RH(D): CPT | Performed by: FAMILY MEDICINE

## 2017-03-22 PROCEDURE — 86900 BLOOD TYPING SEROLOGIC ABO: CPT | Performed by: FAMILY MEDICINE

## 2017-03-22 PROCEDURE — 36415 COLL VENOUS BLD VENIPUNCTURE: CPT | Performed by: FAMILY MEDICINE

## 2017-03-22 PROCEDURE — 85027 COMPLETE CBC AUTOMATED: CPT | Performed by: FAMILY MEDICINE

## 2017-03-22 NOTE — MR AVS SNAPSHOT
After Visit Summary   3/22/2017    Valarie Talavera    MRN: [id number]           Patient Information     Date Of Birth          1990        Visit Information        Provider Department      3/22/2017 9:00 AM NL OB INTAKE [facility] [place]        Today's Diagnoses     Other normal pregnancy, not first    -  1       Follow-ups after your visit        Your next 10 appointments already scheduled     Mar 22, 2017  2:40 PM CDT   US OB < 14 WEEKS SINGLE with ZMUS1   [facility]    [address] [address] [phone]           Please bring a list of your medicines (including vitamins, minerals and over-the-counter drugs). Also, tell your doctor about any allergies you may have. Wear comfortable clothes and leave your valuables at home.  If you re less than 20 weeks drink four 8-ounce glasses of fluid an hour before your exam. If you need to empty your bladder before your exam, try to release only a little urine. Then, drink another glass of fluid.  You may have up to two family members in the exam room. If you bring a small child, an adult must be there to care for him or her.  Please call the Imaging Department at your exam site with any questions.              Future tests that were ordered for you today     Open Future Orders        Priority Expected Expires Ordered    US OB < 14 Weeks Single Routine  3/22/2018 3/22/2017            Who to contact     If you have questions or need follow up information about today's clinic visit or your schedule please contact [facility] directly at [phone].  Normal or non-critical lab and imaging results will be communicated to you by MyChart, letter or phone within 4 business days after the clinic has received the results. If you do not hear from us within 7 days, please contact the clinic through MyChart or phone. If you have a critical or abnormal lab result, we will  "notify you by phone as soon as possible.  Submit refill requests through [employer] or call your pharmacy and they will forward the refill request to us. Please allow 3 business days for your refill to be completed.          Additional Information About Your Visit        [employer] Information     [employer] lets you send messages to your doctor, view your test results, renew your prescriptions, schedule appointments and more. To sign up, go to [email or web address] . Click on \"Log in\" on the left side of the screen, which will take you to the Welcome page. Then click on \"Sign up Now\" on the right side of the page.     You will be asked to enter the access code listed below, as well as some personal information. Please follow the directions to create your username and password.     Your access code is: [email or web address]  Expires: 2017 12:19 PM     Your access code will  in 90 days. If you need help or a new code, please call your [facility] clinic or [phone].        Care EveryWhere ID     This is your Care EveryWhere ID. This could be used by other organizations to access your [facility] medical records  [id number]        Your Vitals Were     Last Period BMI (Body Mass Index)                2017 (Exact Date) 38.37 kg/m2           Blood Pressure from Last 3 Encounters:   03/10/17 100/56   17 132/67   16 102/62    Weight from Last 3 Encounters:   17 245 lb (111.1 kg)   03/10/17 242 lb 6.4 oz (110 kg)   17 246 lb (111.6 kg)              We Performed the Following     ABO/Rh type and screen     Anti treponema EIA     CBC WITH PLATELETS     HEPATITIS B SURFACE ANTIGEN     HIV Antigen Antibody Combo     Rubella Antibody IgG Quantitative        Primary Care Provider Office Phone # Fax #    Aileen Ron [phone]       [facility] [address] [place] [address]        Thank you!     Thank you for choosing [facility]  for your care. " Our goal is always to provide you with excellent care. Hearing back from our patients is one way we can continue to improve our services. Please take a few minutes to complete the written survey that you may receive in the mail after your visit with us. Thank you!             Your Updated Medication List - Protect others around you: Learn how to safely use, store and throw away your medicines at www.disposemymeds.org.          This list is accurate as of: 3/22/17  9:47 AM.  Always use your most recent med list.                   Brand Name Dispense Instructions for use    PRENATAL VITAMINS PO      Take 1 tablet by mouth daily       VITAMIN D PO      Take 500 Units by mouth 2 times daily

## 2017-03-23 LAB
HBV SURFACE AG SERPL QL IA: NONREACTIVE
HIV 1+2 AB+HIV1 P24 AG SERPL QL IA: NORMAL
RUBV IGG SERPL IA-ACNC: NORMAL IU/ML
T PALLIDUM IGG+IGM SER QL: NEGATIVE

## 2017-03-23 NOTE — PROGRESS NOTES
All of your prenatal labs were normal for you except you don't show any immunity to Rubella.  I would recommend caution if you're around anyone with a rash while you're pregnant as your baby wouldn't be protected from exposure to rubella.  I'll also recommend rubella immunization after delivery.    Have a nice day!    Dr. Crisostomo

## 2017-03-24 ENCOUNTER — TELEPHONE (OUTPATIENT)
Dept: FAMILY MEDICINE | Facility: OTHER | Age: 27
End: 2017-03-24

## 2017-03-24 NOTE — TELEPHONE ENCOUNTER
Reason for call:  Results  Reason for Call:  Request for results:    Name of test or procedure: lab     Date of test of procedure: 3-22    Location of the test or procedure: ER    OK to leave the result message on voice mail or with a family member? YES    Phone number Patient can be reached at:  Home number on file [phone] (home)    Additional comments: none    Call taken on 3/24/2017 at 12:47 PM by Hal Moura

## 2017-03-27 NOTE — TELEPHONE ENCOUNTER
LM for pt to return call, when call is returned give information below.    Sally Shaikh CMA ([facility])

## 2017-04-24 ENCOUNTER — PRENATAL OFFICE VISIT (OUTPATIENT)
Dept: FAMILY MEDICINE | Facility: OTHER | Age: 27
End: 2017-04-24
Payer: COMMERCIAL

## 2017-04-24 VITALS
WEIGHT: 250.4 LBS | DIASTOLIC BLOOD PRESSURE: 56 MMHG | BODY MASS INDEX: 39.3 KG/M2 | TEMPERATURE: 98.2 F | SYSTOLIC BLOOD PRESSURE: 100 MMHG | HEIGHT: 67 IN | RESPIRATION RATE: 16 BRPM | HEART RATE: 100 BPM

## 2017-04-24 DIAGNOSIS — R87.613 HSIL ON PAP SMEAR OF CERVIX: ICD-10-CM

## 2017-04-24 DIAGNOSIS — Z34.82 ENCOUNTER FOR SUPERVISION OF OTHER NORMAL PREGNANCY, SECOND TRIMESTER: Primary | ICD-10-CM

## 2017-04-24 PROCEDURE — 81511 FTL CGEN ABNOR FOUR ANAL: CPT | Mod: 90 | Performed by: FAMILY MEDICINE

## 2017-04-24 PROCEDURE — 87491 CHLMYD TRACH DNA AMP PROBE: CPT | Performed by: FAMILY MEDICINE

## 2017-04-24 PROCEDURE — 87591 N.GONORRHOEAE DNA AMP PROB: CPT | Performed by: FAMILY MEDICINE

## 2017-04-24 PROCEDURE — 99207 ZZC FIRST OB VISIT: CPT | Performed by: FAMILY MEDICINE

## 2017-04-24 PROCEDURE — 99000 SPECIMEN HANDLING OFFICE-LAB: CPT | Performed by: FAMILY MEDICINE

## 2017-04-24 PROCEDURE — 36415 COLL VENOUS BLD VENIPUNCTURE: CPT | Performed by: FAMILY MEDICINE

## 2017-04-24 ASSESSMENT — PAIN SCALES - GENERAL: PAINLEVEL: MODERATE PAIN (4)

## 2017-04-24 NOTE — PROGRESS NOTES
Valarie Talavera is a 26 year old co-habitating who presents to the clinic for an new ob visit.         Estimated Date of Delivery: Oct 18, 2017  Reviewed nurse intake visit on 3/22  Concerns: doing well overall.  Just tired.  Some swelling.      Patient Active Problem List   Diagnosis     Vitamin D deficiency     HSIL on Pap smear of cervix     Encounter for supervision of other normal pregnancy in first trimester     Past Medical History:   Diagnosis Date     Anxiety      Depression with anxiety      Depressive disorder      HSIL on Pap smear of cervix 2/2/2016    colp scheduled 2/11/16     Low blood pressure      Vitamin D deficiency      Past Surgical History:   Procedure Laterality Date     NO HISTORY OF SURGERY       Current Outpatient Prescriptions   Medication Sig Dispense Refill     Prenatal Multivit-Min-Fe-FA (PRENATAL VITAMINS PO) Take 1 tablet by mouth daily       Cholecalciferol (VITAMIN D PO) Take 500 Units by mouth 2 times daily          ====================================================  PERSONAL/SOCIAL HISTORY  Social History     Social History     Marital status: Single     Spouse name: N/A     Number of children: 1     Years of education: N/A     Occupational History      [employer]     Social History Main Topics     Smoking status: Former Smoker     Types: Cigarettes     Quit date: 2/1/2017     Smokeless tobacco: Never Used     Alcohol use No      Comment: Occasionally     Drug use: No     Sexual activity: Yes     Partners: Male     Other Topics Concern     Parent/Sibling W/ Cabg, Mi Or Angioplasty Before 65f 55m? No     Social History Narrative    3/2017  Lives in [place] with Chas and daughter, Irene.  Valarie's aunt is staying with them.  No smokers in the home.  No indoor cats or kittens.  No concerns about domestic violence.     =====================================================   REVIEW OF SYSTEMS  CONSTITUTIONAL:occasional chills  I: NEGATIVE for worrisome rashes, moles or  "lesions  E: NEGATIVE for vision changes or irritation  ENT: NEGATIVE for ear, mouth and throat problems  R: NEGATIVE for significant cough or SOB  BREAST: galactorrhea  CV: lower extremity edema  GI: NEGATIVE for nausea, abdominal pain, heartburn, or change in bowel habits   female: dysuria, but improving  M: NEGATIVE for significant arthralgias or myalgia  N: NEGATIVE for weakness, dizziness or paresthesias  P: NEGATIVE for changes in mood or affect  ====================================================  PHYSICAL EXAM:  /56 (BP Location: Left arm, Patient Position: Chair, Cuff Size: Adult Large)  Pulse 100  Temp 98.2  F (36.8  C) (Temporal)  Resp 16  Ht 5' 7\" (1.702 m)  Wt 250 lb 6.4 oz (113.6 kg)  LMP 01/11/2017 (Exact Date)  BMI 39.22 kg/m2        GENERAL:  Pleasant pregnant female, alert, well groomed.  SKIN:  Warm and dry, without lesions or rashes  HEAD: Symmetrical features.  EYES:  PERRLA,   MOUTH:  Buccal mucosa pink, moist without lesions.    NECK:  Thyroid without enlargement and nodules.  Lymph nodes not palpable.   LUNGS:  Clear to auscultation.  BREAST:  Symmetrical.  No dominant, fixed or suspicious masses are noted.  No skin or nipple changes or axillary nodes.  Self exam is taught and encouraged.  Nipples flat.      HEART:  RRR without murmur.  ABDOMEN: Soft without masses , tenderness or organomegaly.  No CVA tenderness. No scars noted..   MUSCULOSKELETAL:  Full range of motion  EXTREMITIES:  No edema. No significant varicosities.   GENITALIA:  BUS WNL, no lesions noted at last visit  VAGINA:  Deferred, done at last visit  CERVIX:  Deferred, OK at last visit  ADNEXA: Without masses or tenderness at last visit  PELVIS:   Adequate, Pelvis proven to 8 pounds 4 ounces.    =========================================  ICSI Routine Prenatal [place] Guideline  ASSESSMENT/PLAN    ICD-10-CM    1. Encounter for supervision of other normal pregnancy, second trimester Z34.82 Maternal Quad " Screen     NEISSERIA GONORRHOEA PCR     CHLAMYDIA TRACHOMATIS PCR     Maternal Quad Screen   2. HSIL on Pap smear of cervix R87.613       Discussed normal prenatal care.  Pt denies Zika exposure.  Does want to pursue quad screen today (now is 15 weeks by u/s).  Not interested in other additional screening at this time.  Will plan on colposcopy around 20 weeks.  Follow up in 4 weeks per usual routine at this time.         RECOMMENDED WEIGHT GAIN: 15-25 lbs.  Instructed on best evidence for: weight gain for her BMI for pregnancy; healthy diet and foods to avoid; exercise and activity during pregnancy;avoiding exposure to toxoplasmosis; and maintenance of a generally healthy lifestyle.   Discussed the harms, benefits, side effects and alternative therapies for current prescribed and OTC medications.

## 2017-04-24 NOTE — MR AVS SNAPSHOT
After Visit Summary   4/24/2017    Valarie Talavera    MRN: [id number]           Patient Information     Date Of Birth          1990        Visit Information        Provider Department      4/24/2017 1:30 PM Luis Crisostomo MD [facility]        Today's Diagnoses     Encounter for supervision of other normal pregnancy, second trimester    -  1    HSIL on Pap smear of cervix          Care Instructions    Thank you for visiting [facility]    Keep up the good work.    Contact us or return if questions or concerns.     Please see me in 4 weeks for follow up.       If you had imaging scheduled please refer to your radiology prep sheet.    Appointment    Date_______________     Time_____________    Day:   M TU W TH F    With____________________________    Location_________________________    If you need medication refills, please contact your pharmacy 3 days before your prescriptions runs out. If you are out of refills, your pharmacy will contact contact the clinic.    Contact us or return if questions or concerns.     -Your Care Team:  MD Shi Frazier PA-C Folake Falaki, MD Anoshirvan Mazhari, MD Kelly White, JACKY    General information about your clinic      Clinic hours:     Lab hours:  Phone [phone]  Monday 7:30 am-7 pm    Monday 8:30 am-6:30 pm  Tuesday-Friday 7:30 am-5 pm   Tuesday-Friday 8:30 am-4:30 pm    Pharmacy hours:  Phone [phone]  Monday 8:30 am-7pm  Tuesday-Friday 8:30am-6 pm                                       Mychart assistance [phone]        We would like to hear from you, how was your visit today?    Daisy Vazquez  Patient Information Supervisor   Patient Care Supervisor  [facility], and [facility], and [facility]  [phone]           Follow-ups after your visit        Future tests that were ordered for you today      "Open Future Orders        Priority Expected Expires Ordered    Maternal Quad Screen Routine  10/16/2017 2017            Who to contact     If you have questions or need follow up information about today's clinic visit or your schedule please contact [facility] directly at [phone].  Normal or non-critical lab and imaging results will be communicated to you by MyChart, letter or phone within 4 business days after the clinic has received the results. If you do not hear from us within 7 days, please contact the clinic through [employer] or phone. If you have a critical or abnormal lab result, we will notify you by phone as soon as possible.  Submit refill requests through [employer] or call your pharmacy and they will forward the refill request to us. Please allow 3 business days for your refill to be completed.          Additional Information About Your Visit        MyChart Information     [employer] lets you send messages to your doctor, view your test results, renew your prescriptions, schedule appointments and more. To sign up, go to [email or web address] . Click on \"Log in\" on the left side of the screen, which will take you to the Welcome page. Then click on \"Sign up Now\" on the right side of the page.     You will be asked to enter the access code listed below, as well as some personal information. Please follow the directions to create your username and password.     Your access code is: [email or web address]  Expires: 2017 12:19 PM     Your access code will  in 90 days. If you need help or a new code, please call your [place] clinic or [phone].        Care EveryWhere ID     This is your Care EveryWhere ID. This could be used by other organizations to access your [place] medical records  [id number]        Your Vitals Were     Pulse Temperature Respirations Height Last Period BMI (Body Mass Index)    100 98.2  F (36.8  C) (Temporal) 16 5' 7\" (1.702 m) 2017 (Exact Date) 39.22 " kg/m2       Blood Pressure from Last 3 Encounters:   04/24/17 100/56   03/10/17 100/56   03/08/17 132/67    Weight from Last 3 Encounters:   04/24/17 250 lb 6.4 oz (113.6 kg)   03/22/17 245 lb (111.1 kg)   03/10/17 242 lb 6.4 oz (110 kg)              We Performed the Following     CHLAMYDIA TRACHOMATIS PCR     NEISSERIA GONORRHOEA PCR        Primary Care Provider Office Phone # Fax #    Aileen Lynette Ron [phone]       [facility] [address] [place] [address]        Thank you!     Thank you for choosing [facility]  for your care. Our goal is always to provide you with excellent care. Hearing back from our patients is one way we can continue to improve our services. Please take a few minutes to complete the written survey that you may receive in the mail after your visit with us. Thank you!             Your Updated Medication List - Protect others around you: Learn how to safely use, store and throw away your medicines at www.disposemymeds.org.          This list is accurate as of: 4/24/17  1:50 PM.  Always use your most recent med list.                   Brand Name Dispense Instructions for use    PRENATAL VITAMINS PO      Take 1 tablet by mouth daily       VITAMIN D PO      Take 500 Units by mouth 2 times daily

## 2017-04-24 NOTE — NURSING NOTE
"Chief Complaint   Patient presents with     Prenatal Care     Panel Management     maternal screen        Initial /56 (BP Location: Left arm, Patient Position: Chair, Cuff Size: Adult Large)  Pulse 100  Temp 98.2  F (36.8  C) (Temporal)  Resp 16  Ht 5' 7\" (1.702 m)  Wt 250 lb 6.4 oz (113.6 kg)  LMP 01/11/2017 (Exact Date)  BMI 39.22 kg/m2 Estimated body mass index is 39.22 kg/(m^2) as calculated from the following:    Height as of this encounter: 5' 7\" (1.702 m).    Weight as of this encounter: 250 lb 6.4 oz (113.6 kg).  Medication Reconciliation: fariha Larsen, BEAR    "

## 2017-04-24 NOTE — PATIENT INSTRUCTIONS
Thank you for visiting [facility]    Keep up the good work.    Contact us or return if questions or concerns.     Please see me in 4 weeks for follow up.       If you had imaging scheduled please refer to your radiology prep sheet.    Appointment    Date_______________     Time_____________    Day:   M TU W TH F    With____________________________    Location_________________________    If you need medication refills, please contact your pharmacy 3 days before your prescriptions runs out. If you are out of refills, your pharmacy will contact contact the clinic.    Contact us or return if questions or concerns.     -Your Care Team:  MD Shi Frazier PA-C Folake Falaki, MD Anoshirvan Mazhari, MD Halima Elder, CNP    General information about your clinic      Clinic hours:     Lab hours:  Phone [phone]  Monday 7:30 am-7 pm    Monday 8:30 am-6:30 pm  Tuesday-Friday 7:30 am-5 pm   Tuesday-Friday 8:30 am-4:30 pm    Pharmacy hours:  Phone [phone]  Monday 8:30 am-7pm  Tuesday-Friday 8:30am-6 pm                                       Mychart assistance [phone]        We would like to hear from you, how was your visit today?    Daisy Vazquez  Patient Information Supervisor   Patient Care Supervisor  [facility], and [facility], and [facility]  [phone]

## 2017-04-26 LAB
C TRACH DNA SPEC QL NAA+PROBE: NORMAL
N GONORRHOEA DNA SPEC QL NAA+PROBE: NORMAL
SPECIMEN SOURCE: NORMAL
SPECIMEN SOURCE: NORMAL

## 2017-05-10 LAB
# FETUSES US: NORMAL
AFP ADJ MOM AMN: 1.26
AFP SERPL-MCNC: 24 NG/ML
AGE - REPORTED: 26
DATING METHOD: NORMAL
DIABETIC AT CONCEPTION: NO
FAMILY MEMBER DISEASES HX: NO
FAMILY MEMBER DISEASES HX: NO
GA METHOD: NORMAL
GA: 14.71 WK
HCG MOM SERPL: 0.82
HCG SERPL-ACNC: NORMAL M[IU]/ML
HX OF HEREDITARY DISORDERS: NO
IDDM PATIENT QL: NO
INHIBIN A MOM SERPL: 0.45
INHIBIN A SERPL-MCNC: 71
INTEGRATED SCN PATIENT-IMP: NORMAL
LMP START DATE: NORMAL
PATHOLOGY STUDY: NORMAL
PREV HX CHROMOSOME ABNORMALITY: NO
SPECIMEN DRAWN SERPL: NORMAL
TWINS: NO
U ESTRIOL MOM SERPL: 0.73
U ESTRIOL SERPL-MCNC: 0.35 NG/ML

## 2017-05-15 ENCOUNTER — TELEPHONE (OUTPATIENT)
Dept: FAMILY MEDICINE | Facility: OTHER | Age: 27
End: 2017-05-15

## 2017-05-15 ENCOUNTER — HOSPITAL ENCOUNTER (EMERGENCY)
Facility: CLINIC | Age: 27
Discharge: HOME | End: 2017-05-15
Attending: PHYSICIAN ASSISTANT | Admitting: PHYSICIAN ASSISTANT
Payer: COMMERCIAL

## 2017-05-15 VITALS
HEART RATE: 94 BPM | OXYGEN SATURATION: 98 % | HEIGHT: 68 IN | TEMPERATURE: 98.6 F | SYSTOLIC BLOOD PRESSURE: 132 MMHG | WEIGHT: 248.4 LBS | RESPIRATION RATE: 18 BRPM | DIASTOLIC BLOOD PRESSURE: 74 MMHG | BODY MASS INDEX: 37.65 KG/M2

## 2017-05-15 DIAGNOSIS — J01.00 ACUTE MAXILLARY SINUSITIS, RECURRENCE NOT SPECIFIED: ICD-10-CM

## 2017-05-15 DIAGNOSIS — E86.0 DEHYDRATION: ICD-10-CM

## 2017-05-15 DIAGNOSIS — J20.9 ACUTE BRONCHITIS, UNSPECIFIED ORGANISM: ICD-10-CM

## 2017-05-15 LAB
ANION GAP SERPL CALCULATED.3IONS-SCNC: 7 MMOL/L (ref 3–14)
BASOPHILS # BLD AUTO: 0 10E9/L (ref 0–0.2)
BASOPHILS NFR BLD AUTO: 0.2 %
BUN SERPL-MCNC: 7 MG/DL (ref 7–30)
CALCIUM SERPL-MCNC: 8.5 MG/DL (ref 8.5–10.1)
CHLORIDE SERPL-SCNC: 107 MMOL/L (ref 94–109)
CO2 SERPL-SCNC: 27 MMOL/L (ref 20–32)
CREAT SERPL-MCNC: 0.45 MG/DL (ref 0.52–1.04)
CRP SERPL-MCNC: 34.8 MG/L (ref 0–8)
DIFFERENTIAL METHOD BLD: ABNORMAL
EOSINOPHIL # BLD AUTO: 0.3 10E9/L (ref 0–0.7)
EOSINOPHIL NFR BLD AUTO: 2.5 %
ERYTHROCYTE [DISTWIDTH] IN BLOOD BY AUTOMATED COUNT: 13.1 % (ref 10–15)
GFR SERPL CREATININE-BSD FRML MDRD: ABNORMAL ML/MIN/1.7M2
GLUCOSE SERPL-MCNC: 86 MG/DL (ref 70–99)
HCT VFR BLD AUTO: 36.1 % (ref 35–47)
HGB BLD-MCNC: 12.2 G/DL (ref 11.7–15.7)
IMM GRANULOCYTES # BLD: 0.1 10E9/L (ref 0–0.4)
IMM GRANULOCYTES NFR BLD: 0.4 %
LYMPHOCYTES # BLD AUTO: 2.8 10E9/L (ref 0.8–5.3)
LYMPHOCYTES NFR BLD AUTO: 23.3 %
MCH RBC QN AUTO: 30 PG (ref 26.5–33)
MCHC RBC AUTO-ENTMCNC: 33.8 G/DL (ref 31.5–36.5)
MCV RBC AUTO: 89 FL (ref 78–100)
MONOCYTES # BLD AUTO: 1.1 10E9/L (ref 0–1.3)
MONOCYTES NFR BLD AUTO: 8.8 %
NEUTROPHILS # BLD AUTO: 7.9 10E9/L (ref 1.6–8.3)
NEUTROPHILS NFR BLD AUTO: 64.8 %
PLATELET # BLD AUTO: 268 10E9/L (ref 150–450)
POTASSIUM SERPL-SCNC: 3.7 MMOL/L (ref 3.4–5.3)
RBC # BLD AUTO: 4.07 10E12/L (ref 3.8–5.2)
SODIUM SERPL-SCNC: 141 MMOL/L (ref 133–144)
WBC # BLD AUTO: 12.1 10E9/L (ref 4–11)

## 2017-05-15 PROCEDURE — 86140 C-REACTIVE PROTEIN: CPT | Performed by: PHYSICIAN ASSISTANT

## 2017-05-15 PROCEDURE — 85025 COMPLETE CBC W/AUTO DIFF WBC: CPT | Performed by: PHYSICIAN ASSISTANT

## 2017-05-15 PROCEDURE — 80048 BASIC METABOLIC PNL TOTAL CA: CPT | Performed by: PHYSICIAN ASSISTANT

## 2017-05-15 PROCEDURE — 99284 EMERGENCY DEPT VISIT MOD MDM: CPT | Mod: Z6 | Performed by: PHYSICIAN ASSISTANT

## 2017-05-15 PROCEDURE — 99284 EMERGENCY DEPT VISIT MOD MDM: CPT | Mod: 25 | Performed by: PHYSICIAN ASSISTANT

## 2017-05-15 PROCEDURE — 25000128 H RX IP 250 OP 636: Performed by: PHYSICIAN ASSISTANT

## 2017-05-15 PROCEDURE — 96360 HYDRATION IV INFUSION INIT: CPT | Performed by: PHYSICIAN ASSISTANT

## 2017-05-15 PROCEDURE — 25000132 ZZH RX MED GY IP 250 OP 250 PS 637: Performed by: PHYSICIAN ASSISTANT

## 2017-05-15 PROCEDURE — 87801 DETECT AGNT MULT DNA AMPLI: CPT | Performed by: PHYSICIAN ASSISTANT

## 2017-05-15 RX ORDER — ACETAMINOPHEN 325 MG/1
975 TABLET ORAL ONCE
Status: COMPLETED | OUTPATIENT
Start: 2017-05-15 | End: 2017-05-15

## 2017-05-15 RX ORDER — SODIUM CHLORIDE 9 MG/ML
1000 INJECTION, SOLUTION INTRAVENOUS CONTINUOUS
Status: DISCONTINUED | OUTPATIENT
Start: 2017-05-15 | End: 2017-05-15 | Stop reason: HOSPADM

## 2017-05-15 RX ORDER — ALBUTEROL SULFATE 90 UG/1
2 AEROSOL, METERED RESPIRATORY (INHALATION) EVERY 4 HOURS PRN
Qty: 1 INHALER | Refills: 0 | Status: SHIPPED | OUTPATIENT
Start: 2017-05-15 | End: 2017-05-26

## 2017-05-15 RX ORDER — LIDOCAINE 40 MG/G
CREAM TOPICAL
Status: DISCONTINUED | OUTPATIENT
Start: 2017-05-15 | End: 2017-05-15 | Stop reason: HOSPADM

## 2017-05-15 RX ORDER — AMOXICILLIN 875 MG
875 TABLET ORAL 2 TIMES DAILY
Qty: 20 TABLET | Refills: 0 | Status: SHIPPED | OUTPATIENT
Start: 2017-05-15 | End: 2017-05-26

## 2017-05-15 RX ADMIN — SODIUM CHLORIDE 1000 ML: 9 INJECTION, SOLUTION INTRAVENOUS at 16:33

## 2017-05-15 RX ADMIN — ACETAMINOPHEN 975 MG: 325 TABLET ORAL at 17:48

## 2017-05-15 NOTE — TELEPHONE ENCOUNTER
Valarie Talavera is a 26 year old female    S-(situation): Valarie is calling today with concerns of cough    B-(background): 17 weeks pregnant    A-(assessment): Has been really bad for about 2 days, to the point she feels she will pass out from coughing. Cough is nonproductive. Makes chest and stomach hurt. Feels like vomiting with severe coughing episodes. Runny nose present.     Pain scale (1-10): 4/10   Denies: Fever, recent travel, recent exposure to illness, sob, headache, dehydration, bowel or bladder issues, chest pain unrelated to cough, ear pain, sore throat, vision problems   Meds/MeasuresTried: Mucinex, claritin, cough drops   Improves/Worsens: n/a       R-(recommendations): See in 24 hours - patient states she will go to an UC as she wants this taken care of today.   Will comply with recommendation: YES     If further questions/concerns or if Sxs do not improve, worsen or new Sxs develop, call your PCP or [facility] as soon as possible.    Guideline used:  Telephone Triage Protocols for Nurses, Fourth Edition, Rissa Coreas  Cough    NOTES:  Disposition was determined by the first positive assessment question, therefore all previous assessment questions were negative     Mima Campuzano, RN, BSN

## 2017-05-15 NOTE — ED AVS SNAPSHOT
[facility] Emergency Department    [address] [place] [address]    Phone:  [phone]    Fax:  [phone]                                       Valarie Talavera   MRN: [id number]    Department:  [facility] Emergency Department   Date of Visit:  5/15/2017           Patient Information     Date Of Birth          1990        Your diagnoses for this visit were:     Acute maxillary sinusitis, recurrence not specified     Acute bronchitis, unspecified organism     Dehydration        You were seen by Pérez Martinez PA-C.      Follow-up Information     Follow up with Luis Crisostomo MD.    Specialty:  Family Practice    Why:  As needed, If symptoms worsen or do not improve    Contact information:    [facility]  [address] [place] [id number] [phone]          Discharge Instructions       1.  Please try Saline Nasal Wash ( [employer] makes a good product), or you could use saline nasal spray as an alternative.  This will help cleanse out your sinuses.    2.  Please start the Amoxicillin ( antibiotic ).  3.  Please use the Albuterol Inhaler every 4 hours on a regular basis to help with the bronchial spasms.         Future Appointments        Provider Department Dept Phone Center    5/26/2017 3:00 PM Luis Crisostomo MD, MD [facility] [phone] [facility]      24 Hour Appointment Hotline       To make an appointment at any [facility], call [phone] ([phone]). If you don't have a family doctor or clinic, we will help you find one. [facility] are conveniently located to serve the needs of you and your family.             Review of your medicines      START taking        Dose / Directions Last dose taken    albuterol 108 (90 BASE) MCG/ACT Inhaler   Commonly known as:  albuterol   Dose:  2 puff   Quantity:  1 Inhaler        Inhale 2 puffs into the lungs every 4 hours as needed for shortness of breath / dyspnea    Refills:  0        amoxicillin 875 MG tablet   Commonly known as:  AMOXIL   Dose:  875 mg   Quantity:  20 tablet        Take 1 tablet (875 mg) by mouth 2 times daily   Refills:  0          Our records show that you are taking the medicines listed below. If these are incorrect, please call your family doctor or clinic.        Dose / Directions Last dose taken    PRENATAL VITAMINS PO   Dose:  1 tablet        Take 1 tablet by mouth daily   Refills:  0        VITAMIN D PO   Dose:  500 Units        Take 500 Units by mouth 2 times daily   Refills:  0                Prescriptions were sent or printed at these locations (2 Prescriptions)                   [facility] - [place], [place] - [address]   [address], [facility] [address]    Telephone:  [phone]   Fax:  [phone]   Hours:                  E-Prescribed (2 of 2)         albuterol (ALBUTEROL) 108 (90 BASE) MCG/ACT Inhaler               amoxicillin (AMOXIL) 875 MG tablet                Procedures and tests performed during your visit     Basic metabolic panel    Bordetella pert parapert DNA pcr    CBC with platelets differential    CRP inflammation    Peripheral IV catheter      Orders Needing Specimen Collection     None      Pending Results     Date and Time Order Name Status Description    5/15/2017 1611 Bordetella pert parapert DNA pcr In process             Pending Culture Results     Date and Time Order Name Status Description    5/15/2017 1611 Bordetella pert parapert DNA pcr In process             Pending Results Instructions     If you had any lab results that were not finalized at the time of your Discharge, you can call the ED Lab Result RN at [phone]. You will be contacted by this team for any positive Lab results or changes in treatment. The nurses are available 7 days a week from 10A to 6:30P.  You can leave a message 24 hours per day and they will return your call.        Thank you for choosing [facility]       Thank you for choosing  "[facility] for your care. Our goal is always to provide you with excellent care. Hearing back from our patients is one way we can continue to improve our services. Please take a few minutes to complete the written survey that you may receive in the mail after you visit with us. Thank you!        [employer] Information     [facility] lets you send messages to your doctor, view your test results, renew your prescriptions, schedule appointments and more. To sign up, go to [email or web address] . Click on \"Log in\" on the left side of the screen, which will take you to the Welcome page. Then click on \"Sign up Now\" on the right side of the page.     You will be asked to enter the access code listed below, as well as some personal information. Please follow the directions to create your username and password.     Your access code is: [email or web address]  Expires: 2017  6:44 PM     Your access code will  in 90 days. If you need help or a new code, please call your [facility] clinic or [phone].        Care EveryWhere ID     This is your Care EveryWhere ID. This could be used by other organizations to access your [facility] medical records  [id number]        After Visit Summary       This is your record. Keep this with you and show to your community pharmacist(s) and doctor(s) at your next visit.                  "

## 2017-05-15 NOTE — ED PROVIDER NOTES
"  History     Chief Complaint   Patient presents with     Cough     HPI  Valarie Talavera is a 26 year old female who presents for evaluation of a cough for the past 2 days. Symptoms started 1 week ago with rhinorrhea and congestion. She thought it was allergies, so she was taking Claritin as needed. Cough started 2 days ago and has worsened. Nonproductive cough which is spasmodic. She states that she coughed to the point of syncope in her car. She pulled into a parking lot and had a rather significant coughing today. She states that she slumped back into her chair. She woke up and started coughing again and stated that then she fell forward onto the steering wheel. She woke up immediately after that. She states that she feels like there is a \"eat internal tickle\" in her throat. She has tried cough drops without any particular improvement. She has not kept much for fluids and food down today as she has been coughing to the point of emesis. This is exacerbated by the fact that she is in her second trimester of pregnancy. She has noted fetal movement throughout the day. Denies any vaginal bleeding, abdominal pain, or cramping. No fevers or chills. No facial/sinus pain. No otalgia.  Patient reports having pertussis in the past.  She did have her teeth Updated with her last pregnancy about 3 years prior.      I have reviewed the Medications, Allergies, Past Medical and Surgical History, and Social History in the [email or web address] system.    Past Medical History:   Diagnosis Date     Anxiety      Depression with anxiety      Depressive disorder      HSIL on Pap smear of cervix 2/2/2016    colp scheduled 2/11/16     Low blood pressure      Vitamin D deficiency         Patient Active Problem List   Diagnosis     Vitamin D deficiency     HSIL on Pap smear of cervix     Encounter for supervision of other normal pregnancy in first trimester        Past Surgical History:   Procedure Laterality Date     NO HISTORY OF SURGERY          Social " "History     Social History     Marital status: Single     Spouse name: N/A     Number of children: N/A     Years of education: N/A     Occupational History      [employer]     Social History Main Topics     Smoking status: Former Smoker     Types: Cigarettes     Quit date: 2/1/2017     Smokeless tobacco: Never Used     Alcohol use No      Comment: Occasionally     Drug use: No     Sexual activity: Yes     Partners: Male     Other Topics Concern     Parent/Sibling W/ Cabg, Mi Or Angioplasty Before 65f 55m? No     Social History Narrative    3/2017  [place]ives in [place] with Chas and daughter, Irene.  Valarie's aunt is staying with them.  No smokers in the home.  No indoor cats or kittens.  No concerns about domestic violence.        No current facility-administered medications for this encounter.      Current Outpatient Prescriptions   Medication     albuterol (ALBUTEROL) 108 (90 BASE) MCG/ACT Inhaler     amoxicillin (AMOXIL) 875 MG tablet     Prenatal Multivit-Min-Fe-FA (PRENATAL VITAMINS PO)     Cholecalciferol (VITAMIN D PO)           Allergies   Allergen Reactions     Zithromax [Azithromycin] Other (See Comments)     hives          Review of Systems   All other systems reviewed and are negative.      Physical Exam   BP: 139/75 (R) lower arm, regular cuff)  Pulse: 98  Temp: 98.6  F (37  C)  Resp: 18  Height: 172.7 cm (5' 8\")  Weight: 112.7 kg (248 lb 6.4 oz)  SpO2: 98 %  Physical Exam  Dry sounding cough.  Deep cough.  Generally healthy appearing female in NAD who is active and non-toxic appearing.   Skin:  No rashes or lesions are noted on inspection of the torso, face, and upper extremities.   Head: Normocephalic, atraumatic, nontender to palpation  Eyes: PERRLA, conjunctiva and sclera clear  Ears: Bilateral TM's and canals are clear.  TM's translucent without erythema or effusion.  Nose: Nares normal and patent bilaterally.  Mucous membranes are erythematous and edematous.  Significant bilateral maxillary and " frontal sinus tenderness.  Throat: Mucous membranes are clear.  No tonsilar hypertrophy, exudate, or erythema.  Neck: Supple.  FROM without pain.  No adenopathy.  No thyromegaly.   Heart:  RRR with normal S1 and S2.  No S3 or S4.  No murmur, rub, gallop, or click.  PMI is nondisplaced.   Lungs:  CTA bilaterally without wheezes, rales, or rhonchi.  Good breath sounds heard throughout all lung fields.  Tympanitic to percussion with no areas of dullness.   Chest: tender to palpation over the inferior/lateral ribs at the level of the eighth to 10th ribs midaxillary line. No crepitus.  Abdomen: Positive bowel sounds in all four quadrants.  No tenderness to palpation throughout.  No rebound and no guarding.  No hepatosplenomegaly.  No masses.         ED Course     ED Course     4:56 PM - I was asked to come and reevaluate the patient. She was having some left lower chest and left side discomfort. She also felt dizzy and lightheaded. When I entered the room, her color appeared very normal. She looks comfortable and in no acute distress. Lung sounds are normal in the left lung with auscultation. She is very tender to palpation over the left lower rib area around ribs 8 through 10 mid axillary line. No step-off. No crepitus. Abdomen without tenderness. No rebound or guarding. No masses. This appears to be costochondritis type rib pain given her significant coughing. Good lung sounds throughout.  We will give her a dose of Tylenol and keep her in the supine position until the IV fluids have been administered. She is dehydrated, and this is likely the cause of her lightheadedness in the sitting position.        .Procedures             Critical Care time:  none             Results for orders placed or performed during the hospital encounter of 05/15/17   CBC with platelets differential   Result Value Ref Range    WBC 12.1 (H) 4.0 - 11.0 10e9/L    RBC Count 4.07 3.8 - 5.2 10e12/L    Hemoglobin 12.2 11.7 - 15.7 g/dL    Hematocrit  36.1 35.0 - 47.0 %    MCV 89 78 - 100 fl    MCH 30.0 26.5 - 33.0 pg    MCHC 33.8 31.5 - 36.5 g/dL    RDW 13.1 10.0 - 15.0 %    Platelet Count 268 150 - 450 10e9/L    Diff Method Automated Method     % Neutrophils 64.8 %    % Lymphocytes 23.3 %    % Monocytes 8.8 %    % Eosinophils 2.5 %    % Basophils 0.2 %    % Immature Granulocytes 0.4 %    Absolute Neutrophil 7.9 1.6 - 8.3 10e9/L    Absolute Lymphocytes 2.8 0.8 - 5.3 10e9/L    Absolute Monocytes 1.1 0.0 - 1.3 10e9/L    Absolute Eosinophils 0.3 0.0 - 0.7 10e9/L    Absolute Basophils 0.0 0.0 - 0.2 10e9/L    Abs Immature Granulocytes 0.1 0 - 0.4 10e9/L   CRP inflammation   Result Value Ref Range    CRP Inflammation 34.8 (H) 0.0 - 8.0 mg/L   Basic metabolic panel   Result Value Ref Range    Sodium 141 133 - 144 mmol/L    Potassium 3.7 3.4 - 5.3 mmol/L    Chloride 107 94 - 109 mmol/L    Carbon Dioxide 27 20 - 32 mmol/L    Anion Gap 7 3 - 14 mmol/L    Glucose 86 70 - 99 mg/dL    Urea Nitrogen 7 7 - 30 mg/dL    Creatinine 0.45 (L) 0.52 - 1.04 mg/dL    GFR Estimate >90  Non  GFR Calc   >60 mL/min/1.7m2    GFR Estimate If Black >90   GFR Calc   >60 mL/min/1.7m2    Calcium 8.5 8.5 - 10.1 mg/dL          Labs Ordered and Resulted from Time of ED Arrival Up to the Time of Departure from the ED   CBC WITH PLATELETS DIFFERENTIAL - Abnormal; Notable for the following:        Result Value    WBC 12.1 (*)     All other components within normal limits   CRP INFLAMMATION - Abnormal; Notable for the following:     CRP Inflammation 34.8 (*)     All other components within normal limits   BASIC METABOLIC PANEL - Abnormal; Notable for the following:     Creatinine 0.45 (*)     All other components within normal limits   PERIPHERAL IV CATHETER   BORDETELLA PER/PARAPER PCR       Assessments & Plan (with Medical Decision Making)     Acute maxillary sinusitis, recurrence not specified  Acute bronchitis, unspecified organism  Dehydration     26 year old female  resents for evaluation of a cough for the past 2 days which is significantly worsening. Cough induced syncope just prior to arrival per patient report. Left lateral chest discomfort due to the coughing spasms. URI symptoms of rhinorrhea and congestion preceded her cough for about 8 days She has been unable to keep down fluids or food today due to her symptoms. Currently in her second trimester pregnancy and noticing regular fetal movement.  On exam her vital signs are stable. Deep sounding cough noted. Left-sided rib discomfort without crepitus. Significant frontal and maxillary sinus tenderness to palpation.Remainder of the exam otherwise normal. Given her dehydration she was given 1 L of IV normal saline for hydration.  She did feel better after this. Labs with an elevated white blood cell count and elevated CRP.  basic metabolic panel normal.  Patient with ongoing URI symptoms for the past 8 days, and now with a worsening cough. Exam findings are suggestive of underlying sinusitis. She likely is experiencing postnasal drip causing a spasmodic cough. Given her current pregnancy and normal pulmonary exam, we did not perform a chest x-ray for further workup. Laboratory levels with an elevated CRP and elevated white blood cell count. Therefore, I think antibiotic treatment is appropriate. Amoxicillin given per orders. Saline nasal rinse recommended.  Albuterol MDI to use for spasmodic cough. Possible side effects of medication discussed with her. Return if symptoms change or worsening. She is in agreement with this plan and was suitable for discharge.     I have reviewed the nursing notes.    I have reviewed the findings, diagnosis, plan and need for follow up with the patient.    Discharge Medication List as of 5/15/2017  6:45 PM      START taking these medications    Details   albuterol (ALBUTEROL) 108 (90 BASE) MCG/ACT Inhaler Inhale 2 puffs into the lungs every 4 hours as needed for shortness of breath / dyspnea,  Disp-1 Inhaler, R-0, E-Prescribe      amoxicillin (AMOXIL) 875 MG tablet Take 1 tablet (875 mg) by mouth 2 times daily, Disp-20 tablet, R-0, E-Prescribe             Final diagnoses:   Acute maxillary sinusitis, recurrence not specified   Acute bronchitis, unspecified organism   Dehydration       Disclaimer: This note consists of symbols derived from keyboarding, dictation and/or voice recognition software. As a result, there may be errors in the script that have gone undetected. Please consider this when interpreting information found in this chart.      5/15/2017   Pérez Martinez PA-C   [facility] EMERGENCY DEPARTMENT     Pérez Martinez PA-C  05/16/17 0020

## 2017-05-15 NOTE — ED NOTES
Patient presents with concerns for cough, dehydration. Patient is 18 weeks pregnant. Meme Uriarte RN

## 2017-05-15 NOTE — DISCHARGE INSTRUCTIONS
1.  Please try Saline Nasal Wash ( [employer] makes a good product), or you could use saline nasal spray as an alternative.  This will help cleanse out your sinuses.    2.  Please start the Amoxicillin ( antibiotic ).  3.  Please use the Albuterol Inhaler every 4 hours on a regular basis to help with the bronchial spasms.

## 2017-05-15 NOTE — ED AVS SNAPSHOT
[facility] Emergency Department    [address] [place] [address]    Phone:  [phone]    Fax:  [phone]                                       Valarie Talavera   MRN: [id number]    Department:  [facility] Emergency Department   Date of Visit:  5/15/2017           After Visit Summary Signature Page     I have received my discharge instructions, and my questions have been answered. I have discussed any challenges I see with this plan with the nurse or doctor.    ..........................................................................................................................................  Patient/Patient Representative Signature      ..........................................................................................................................................  Patient Representative Print Name and Relationship to Patient    ..................................................               ................................................  Date                                            Time    ..........................................................................................................................................  Reviewed by Signature/Title    ...................................................              ..............................................  Date                                                            Time

## 2017-05-17 LAB
B PERT+PARAPERT DNA PNL SPEC NAA+PROBE: NORMAL
SPECIMEN SOURCE: NORMAL

## 2017-05-18 ENCOUNTER — CARE COORDINATION (OUTPATIENT)
Dept: CARE COORDINATION | Facility: CLINIC | Age: 27
End: 2017-05-18

## 2017-05-18 NOTE — PROGRESS NOTES
SUBJECTIVE:                                                    Valarie Talavera is a 26 year old female who presents to clinic today for the following health issues:    Acute Illness   Acute illness concerns: Cough/ possible bronchitis, was in ER 4 days ago.  Onset: x 5 days    Fever: no    Chills/Sweats: YES- sometimes    Headache (location?): YES    Sinus Pressure:yes- sometimes    Conjunctivitis:  no    Ear Pain: YES: bilateral    Rhinorrhea: YES    Congestion: no     Sore Throat: YES     Cough: YES    Wheeze: YES    Decreased Appetite: YES    Nausea: YES    Vomiting: YES- cough so hard she will throw up    Diarrhea:  no    Dysuria/Freq.: no    Fatigue/Achiness: YES- tired    Sick/Strep Exposure: YES- Work     Therapies Tried and outcome: amoxicillin and inhaler    Patient is here in clinic due to persistent cough and sinus symptoms. She was seen in the ER and started on amoxicillin which she is still taking, she however has been having coughing fits and had to leave her work last night due to cough that led to vomiting. She has noted recurrent nausea which may be due to her current pregnancy but has noted that this has been worse since she got sick.     -------------------------------------    Problem list and histories reviewed & adjusted, as indicated.  Additional history: as documented    BP Readings from Last 3 Encounters:   05/19/17 104/58   05/15/17 132/74   04/24/17 100/56    Wt Readings from Last 3 Encounters:   05/19/17 249 lb 9.6 oz (113.2 kg)   05/15/17 248 lb 6.4 oz (112.7 kg)   04/24/17 250 lb 6.4 oz (113.6 kg)         Reviewed and updated as needed this visit by clinical staff  Tobacco  Allergies  Meds  Problems  Med Hx  Surg Hx  Fam Hx  Soc Hx        Reviewed and updated as needed this visit by Provider  Tobacco  Allergies  Meds  Problems  Med Hx  Surg Hx  Fam Hx  Soc Hx          ROS:  Constitutional, HEENT, cardiovascular, pulmonary, gi and gu systems are negative, except as  "otherwise noted.    OBJECTIVE:                                                    /58 (BP Location: Right arm, Patient Position: Fowlers, Cuff Size: Adult Large)  Pulse 93  Temp 97.8  F (36.6  C) (Temporal)  Resp 16  Ht 5' 6.53\" (1.69 m)  Wt 249 lb 9.6 oz (113.2 kg)  LMP 01/11/2017 (Exact Date)  SpO2 97%  BMI 39.64 kg/m2  Body mass index is 39.64 kg/(m^2).  GENERAL: alert and no distress  HENT: normal cephalic/atraumatic, both ears: clear effusion, rhinorrhea clear, oropharynx clear, oral mucous membranes moist, sinuses: maxillary tenderness on both sides and post nasal drainage  NECK: bilateral anterior cervical adenopathy  RESP: lungs clear to auscultation - no rales, rhonchi or wheezes  CV: regular rates and rhythm, peripheral pulses strong and no peripheral edema  MS: no gross musculoskeletal defects noted, no edema  SKIN: no suspicious lesions or rashes    Diagnostic Test Results:  Results for orders placed or performed during the hospital encounter of 05/15/17   CBC with platelets differential   Result Value Ref Range    WBC 12.1 (H) 4.0 - 11.0 10e9/L    RBC Count 4.07 3.8 - 5.2 10e12/L    Hemoglobin 12.2 11.7 - 15.7 g/dL    Hematocrit 36.1 35.0 - 47.0 %    MCV 89 78 - 100 fl    MCH 30.0 26.5 - 33.0 pg    MCHC 33.8 31.5 - 36.5 g/dL    RDW 13.1 10.0 - 15.0 %    Platelet Count 268 150 - 450 10e9/L    Diff Method Automated Method     % Neutrophils 64.8 %    % Lymphocytes 23.3 %    % Monocytes 8.8 %    % Eosinophils 2.5 %    % Basophils 0.2 %    % Immature Granulocytes 0.4 %    Absolute Neutrophil 7.9 1.6 - 8.3 10e9/L    Absolute Lymphocytes 2.8 0.8 - 5.3 10e9/L    Absolute Monocytes 1.1 0.0 - 1.3 10e9/L    Absolute Eosinophils 0.3 0.0 - 0.7 10e9/L    Absolute Basophils 0.0 0.0 - 0.2 10e9/L    Abs Immature Granulocytes 0.1 0 - 0.4 10e9/L   CRP inflammation   Result Value Ref Range    CRP Inflammation 34.8 (H) 0.0 - 8.0 mg/L   Basic metabolic panel   Result Value Ref Range    Sodium 141 133 - 144 " mmol/L    Potassium 3.7 3.4 - 5.3 mmol/L    Chloride 107 94 - 109 mmol/L    Carbon Dioxide 27 20 - 32 mmol/L    Anion Gap 7 3 - 14 mmol/L    Glucose 86 70 - 99 mg/dL    Urea Nitrogen 7 7 - 30 mg/dL    Creatinine 0.45 (L) 0.52 - 1.04 mg/dL    GFR Estimate >90  Non  GFR Calc   >60 mL/min/1.7m2    GFR Estimate If Black >90   GFR Calc   >60 mL/min/1.7m2    Calcium 8.5 8.5 - 10.1 mg/dL   Bordetella pert parapert DNA pcr   Result Value Ref Range    Specimen Description Nasal     Bordetella Per/Paraper PCR  NEG     Negative  Negative for B. pertussis and B. parapertussis by PCR     This test was developed and its performance characteristics determined by the   [facility] at [place], [place]. The PCR test has proven to be more sensitive than culture and   highly specific.  A false positive for B. pertussis may occur in samples   containing B. holmesii DNA.   A false positive for B. parapertussis may occur in samples containing B.   bronchiseptica DNA. Both B. holmesii and B. bronchiseptica rarely cause disease   in humans.  A negative result does not rule out the presence of B. pertussis or   B. parapertussis DNA in concentrations below detection level of the assay.   Nasopharyngeal swabs are the preferred specimen types.   Analyte Specific Reagents (ASRs) are used in many laboratory tests necessary   for standard medical care and generally do not require U.S. Food and Drug   Administration approval. The FDA has determined that such c learance or approval   is not necessary. This test is used for clinical purposes. It should not be   regarded as investigational or for research.   This laboratory is certified under the Clinical Laboratory Improvement   Amendments of 1988 (CLIA-88) as qualified to perform high complexity clinical   laboratory testing.          ASSESSMENT/PLAN:                                                         ICD-10-CM    1. Acute sinusitis treated with antibiotics in the past 60 days J01.90    2. Nausea R11.0    3. Cough R05    4. Pregnancy, unspecified gestational age Z33.1      I will have patient continue antibiotic and I discussed eating regularly to help with nausea which may be related to pregnancy or post nasal drainage. Follow up in clinic if worsening or not resolving over the next week.   See Patient Instructions    Luisana Anthony PA-C  [facility]

## 2017-05-18 NOTE — PROGRESS NOTES
Clinic Care Coordination Contact  [facility]/Voicemail    Referral Source: ED Follow-Up-Sinus Infection  Clinical Data: Care Coordinator Outreach  Outreach attempted x 1.  Left message on voicemail with call back information and requested return call. Included reminder of appointments noted below    Future Appointments      Provider Department Center   5/19/2017 10:00 AM Luisana Anthony PA-C [facility]   5/26/2017 3:00 PM Luis Crisostomo MD [facility]       Plan: Care Coordinator will mail out care coordination introduction letter with care coordinator contact information and explanation of care coordination services. Care Coordinator will do chart review 5/9. If does not come to scheduled appointment will then do phone oputreach.    Luiz Oropeza RN  Clinic Care Coordinator  [facility]  [phone]

## 2017-05-19 ENCOUNTER — OFFICE VISIT (OUTPATIENT)
Dept: FAMILY MEDICINE | Facility: OTHER | Age: 27
End: 2017-05-19
Payer: COMMERCIAL

## 2017-05-19 VITALS
TEMPERATURE: 97.8 F | RESPIRATION RATE: 16 BRPM | BODY MASS INDEX: 39.18 KG/M2 | HEIGHT: 67 IN | WEIGHT: 249.6 LBS | OXYGEN SATURATION: 97 % | HEART RATE: 93 BPM | DIASTOLIC BLOOD PRESSURE: 58 MMHG | SYSTOLIC BLOOD PRESSURE: 104 MMHG

## 2017-05-19 DIAGNOSIS — Z34.90 PREGNANCY, UNSPECIFIED GESTATIONAL AGE: ICD-10-CM

## 2017-05-19 DIAGNOSIS — J01.90 ACUTE SINUSITIS TREATED WITH ANTIBIOTICS IN THE PAST 60 DAYS: Primary | ICD-10-CM

## 2017-05-19 DIAGNOSIS — R05.9 COUGH: ICD-10-CM

## 2017-05-19 DIAGNOSIS — R11.0 NAUSEA: ICD-10-CM

## 2017-05-19 PROCEDURE — 99213 OFFICE O/P EST LOW 20 MIN: CPT | Performed by: PHYSICIAN ASSISTANT

## 2017-05-19 ASSESSMENT — PAIN SCALES - GENERAL: PAINLEVEL: NO PAIN (0)

## 2017-05-19 NOTE — LETTER
[facility] [place]  [address] [address]  Phone: [phone]    May 19, 2017        Valarie Talavera  [address] [place] [address]          To whom it may concern:    RE: Valarie Talavera    Patient was seen and treated today at our clinic and missed work.  She is being treated for a sinus infection and cough.     Please contact me for questions or concerns.      Sincerely,        Luisana Anthony PA-C

## 2017-05-19 NOTE — PATIENT INSTRUCTIONS
I will have you complete antibiotics and use vitamin c cough drops and salt water gargles(pickle juice) to help with throat irritation and coughing fits. For nausea be sure you are eating regular snacks that have some protein to help limit drops in blood sugar.    Follow up in clinic if worsening or not improving/

## 2017-05-19 NOTE — NURSING NOTE
"Chief Complaint   Patient presents with     Cough     Panel Management     MyChart, HPV       Initial /58 (BP Location: Right arm, Patient Position: Fowlers, Cuff Size: Adult Large)  Pulse 93  Temp 97.8  F (36.6  C) (Temporal)  Resp 16  Ht 5' 6.53\" (1.69 m)  Wt 249 lb 9.6 oz (113.2 kg)  LMP 01/11/2017 (Exact Date)  SpO2 97%  BMI 39.64 kg/m2 Estimated body mass index is 39.64 kg/(m^2) as calculated from the following:    Height as of this encounter: 5' 6.53\" (1.69 m).    Weight as of this encounter: 249 lb 9.6 oz (113.2 kg).  Medication Reconciliation: complete    "

## 2017-05-19 NOTE — MR AVS SNAPSHOT
After Visit Summary   5/19/2017    Valarie Talavera    MRN: [id number]           Patient Information     Date Of Birth          1990        Visit Information        Provider Department      5/19/2017 10:00 AM Luisana Anthony PA-C [facility]        Care Instructions    I will have you complete antibiotics and use vitamin c cough drops and salt water gargles(pickle juice) to help with throat irritation and coughing fits. For nausea be sure you are eating regular snacks that have some protein to help limit drops in blood sugar.    Follow up in clinic if worsening or not improving/        Follow-ups after your visit        Follow-up notes from your care team     Return if symptoms worsen or fail to improve.      Your next 10 appointments already scheduled     May 26, 2017  3:00 PM CDT   ESTABLISHED PRENATAL with Luis Crisostomo MD   [facility] ([facility]    [address] [address] [phone]              Who to contact     If you have questions or need follow up information about today's clinic visit or your schedule please contact [facility] directly at [phone].  Normal or non-critical lab and imaging results will be communicated to you by MyChart, letter or phone within 4 business days after the clinic has received the results. If you do not hear from us within 7 days, please contact the clinic through MyChart or phone. If you have a critical or abnormal lab result, we will notify you by phone as soon as possible.  Submit refill requests through [employer] or call your pharmacy and they will forward the refill request to us. Please allow 3 business days for your refill to be completed.          Additional Information About Your Visit        MyChart Information     [employer] lets you send messages to your doctor, view your test results, renew your prescriptions, schedule appointments and more. To sign  "up, go to [email or web address] . Click on \"Log in\" on the left side of the screen, which will take you to the Welcome page. Then click on \"Sign up Now\" on the right side of the page.     You will be asked to enter the access code listed below, as well as some personal information. Please follow the directions to create your username and password.     Your access code is: [email or web address]  Expires: 2017  6:44 PM     Your access code will  in 90 days. If you need help or a new code, please call your [place] clinic or [phone].        Care EveryWhere ID     This is your Care EveryWhere ID. This could be used by other organizations to access your [place] medical records  [id number]        Your Vitals Were     Pulse Temperature Respirations Height Last Period Pulse Oximetry    93 97.8  F (36.6  C) (Temporal) 16 5' 6.53\" (1.69 m) 2017 (Exact Date) 97%    BMI (Body Mass Index)                   39.64 kg/m2            Blood Pressure from Last 3 Encounters:   17 104/58   05/15/17 132/74   17 100/56    Weight from Last 3 Encounters:   17 249 lb 9.6 oz (113.2 kg)   05/15/17 248 lb 6.4 oz (112.7 kg)   17 250 lb 6.4 oz (113.6 kg)              Today, you had the following     No orders found for display       Primary Care Provider Office Phone # Fax #    Luis Crisostomo [phone]       [facility] [address] [place] [address]        Thank you!     Thank you for choosing [facility]  for your care. Our goal is always to provide you with excellent care. Hearing back from our patients is one way we can continue to improve our services. Please take a few minutes to complete the written survey that you may receive in the mail after your visit with us. Thank you!             Your Updated Medication List - Protect others around you: Learn how to safely use, store and throw away your medicines at www.disposemymeds.org.          This " list is accurate as of: 5/19/17 10:28 AM.  Always use your most recent med list.                   Brand Name Dispense Instructions for use    albuterol 108 (90 BASE) MCG/ACT Inhaler    albuterol    1 Inhaler    Inhale 2 puffs into the lungs every 4 hours as needed for shortness of breath / dyspnea       amoxicillin 875 MG tablet    AMOXIL    20 tablet    Take 1 tablet (875 mg) by mouth 2 times daily       PRENATAL VITAMINS PO      Take 1 tablet by mouth daily       VITAMIN D PO      Take 500 Units by mouth 2 times daily

## 2017-05-22 ENCOUNTER — CARE COORDINATION (OUTPATIENT)
Dept: CARE COORDINATION | Facility: CLINIC | Age: 27
End: 2017-05-22

## 2017-05-22 NOTE — PROGRESS NOTES
Clinic Care Coordination Contact attempt #2  [facility]/Voicemail    Referral Source: ED Follow-Up  Clinical Data: Care Coordinator Outreach    Patient saw Luisana Anthony PA-C 5/19 for ED F/u:Sinusitis. Patient presented with nausea-nothing prescribed told to eat food with meds. Had continued cough-advised to gargel with salt water and use cougt drops. Continue with prescribed abx. F/U as planned with Dr Crisostomo    Outreach attempted x 1. Unable to leave a message on voicemail with call back information and requested return call.    Future Appointments      Provider Department Center   5/26/2017 3:00 PM Luis Crisostomo MD [facility] Establish Prenatal Care       Plan: [facility] mailed out care coordination introduction letter on 5/18/17. Care Coordinator will try to reach patient again in 5-7 business days after reviewing Dr Crisostomo visit note.    Luiz Oropeza RN  Clinic Care Coordinator  [facility]  [phone]

## 2017-05-26 ENCOUNTER — PRENATAL OFFICE VISIT (OUTPATIENT)
Dept: FAMILY MEDICINE | Facility: OTHER | Age: 27
End: 2017-05-26
Payer: COMMERCIAL

## 2017-05-26 VITALS
BODY MASS INDEX: 38.67 KG/M2 | SYSTOLIC BLOOD PRESSURE: 92 MMHG | WEIGHT: 246.4 LBS | HEART RATE: 108 BPM | RESPIRATION RATE: 16 BRPM | TEMPERATURE: 97.9 F | HEIGHT: 67 IN | DIASTOLIC BLOOD PRESSURE: 58 MMHG

## 2017-05-26 DIAGNOSIS — Z34.82 ENCOUNTER FOR SUPERVISION OF OTHER NORMAL PREGNANCY, SECOND TRIMESTER: Primary | ICD-10-CM

## 2017-05-26 DIAGNOSIS — F43.21 ADJUSTMENT DISORDER WITH DEPRESSED MOOD: ICD-10-CM

## 2017-05-26 PROCEDURE — 99207 ZZC COMPLICATED OB VISIT: CPT | Performed by: FAMILY MEDICINE

## 2017-05-26 ASSESSMENT — PAIN SCALES - GENERAL: PAINLEVEL: NO PAIN (0)

## 2017-05-26 NOTE — MR AVS SNAPSHOT
After Visit Summary   5/26/2017    Valarie Talavera    MRN: [id number]           Patient Information     Date Of Birth          1990        Visit Information        Provider Department      5/26/2017 3:00 PM uLis Crisostomo MD [facility]        Today's Diagnoses     Encounter for supervision of other normal pregnancy, second trimester    -  1    Adjustment disorder with depressed mood          Care Instructions    Thank you for visiting [facility]    Keep up the good work overall.    Let me know if you need to pursue something for mood.    Contact us or return if questions or concerns.     Please see me in 4 weeks for OB check.       If you had imaging scheduled please refer to your radiology prep sheet.    Appointment    Date_______________     Time_____________    Day:   M TU W TH F    With____________________________    Location_________________________    If you need medication refills, please contact your pharmacy 3 days before your prescriptions runs out. If you are out of refills, your pharmacy will contact contact the clinic.    Contact us or return if questions or concerns.     -Your Care Team:  MD Shi Frazier PA-C Folake Falaki, MD Anoshirvan Mazhari, MD Kelly White, JACKY    General information about your clinic      Clinic hours:     Lab hours:  Phone [phone]  Monday 7:30 am-7 pm    Monday 8:30 am-6:30 pm  Tuesday-Friday 7:30 am-5 pm   Tuesday-Friday 8:30 am-4:30 pm    Pharmacy hours:  Phone [phone]  Monday 8:30 am-7pm  Tuesday-Friday 8:30am-6 pm                                       Mychart assistance [phone]        We would like to hear from you, how was your visit today?    Daisy Vazquez  Patient Information Supervisor   Patient Care Supervisor  [facility] [place], and [facility], and [facility]  [phone]              Follow-ups after your visit        Your next 10 appointments already scheduled     Jun 02, 2017  2:40 PM CDT   US OB > 14 WEEKS COMPLETE SINGLE with ERUS1   [facility]    [address] [id number] [phone]           Please bring a list of your medicines (including vitamins, minerals and over-the-counter drugs). Also, tell your doctor about any allergies you may have. Wear comfortable clothes and leave your valuables at home.  If you re less than 20 weeks drink four 8-ounce glasses of fluid an hour before your exam. If you need to empty your bladder before your exam, try to release only a little urine. Then, drink another glass of fluid.  You may have up to two family members in the exam room. If you bring a small child, an adult must be there to care for him or her.  Please call the Imaging Department at your exam site with any questions.              Future tests that were ordered for you today     Open Future Orders        Priority Expected Expires Ordered    US OB > 14 Weeks Complete Single Routine  5/26/2018 5/26/2017            Who to contact     If you have questions or need follow up information about today's clinic visit or your schedule please contact [facility] directly at [phone].  Normal or non-critical lab and imaging results will be communicated to you by [employer], letter or phone within 4 business days after the clinic has received the results. If you do not hear from us within 7 days, please contact the clinic through [employer] or phone. If you have a critical or abnormal lab result, we will notify you by phone as soon as possible.  Submit refill requests through [employer] or call your pharmacy and they will forward the refill request to us. Please allow 3 business days for your refill to be completed.          Additional Information About Your Visit        [employer] Information     [employer] lets you send messages to  "your doctor, view your test results, renew your prescriptions, schedule appointments and more. To sign up, go to [email or web address] . Click on \"Log in\" on the left side of the screen, which will take you to the Welcome page. Then click on \"Sign up Now\" on the right side of the page.     You will be asked to enter the access code listed below, as well as some personal information. Please follow the directions to create your username and password.     Your access code is: [email or web address]  Expires: 2017  6:44 PM     Your access code will  in 90 days. If you need help or a new code, please call your [place] clinic or [phone].        Care EveryWhere ID     This is your Care EveryWhere ID. This could be used by other organizations to access your [place] medical records  [id number]        Your Vitals Were     Pulse Temperature Respirations Height Last Period BMI (Body Mass Index)    108 97.9  F (36.6  C) (Temporal) 16 5' 6.53\" (1.69 m) 2017 (Exact Date) 39.14 kg/m2       Blood Pressure from Last 3 Encounters:   17 92/58   17 104/58   05/15/17 132/74    Weight from Last 3 Encounters:   17 246 lb 6.4 oz (111.8 kg)   17 249 lb 9.6 oz (113.2 kg)   05/15/17 248 lb 6.4 oz (112.7 kg)               Primary Care Provider Office Phone # Fax #    Luis Crissotomo [phone]       [facility] [address] [place] [address]        Thank you!     Thank you for choosing [facility]  for your care. Our goal is always to provide you with excellent care. Hearing back from our patients is one way we can continue to improve our services. Please take a few minutes to complete the written survey that you may receive in the mail after your visit with us. Thank you!             Your Updated Medication List - Protect others around you: Learn how to safely use, store and throw away your medicines at www.disposemymeds.org.          This list is " accurate as of: 5/26/17  3:17 PM.  Always use your most recent med list.                   Brand Name Dispense Instructions for use    PRENATAL VITAMINS PO      Take 1 tablet by mouth daily       VITAMIN D PO      Take 500 Units by mouth 2 times daily

## 2017-05-26 NOTE — PATIENT INSTRUCTIONS
Thank you for visiting [facility]    Keep up the good work overall.    Let me know if you need to pursue something for mood.    Contact us or return if questions or concerns.     Please see me in 4 weeks for OB check.       If you had imaging scheduled please refer to your radiology prep sheet.    Appointment    Date_______________     Time_____________    Day:   M TU W TH F    With____________________________    Location_________________________    If you need medication refills, please contact your pharmacy 3 days before your prescriptions runs out. If you are out of refills, your pharmacy will contact contact the clinic.    Contact us or return if questions or concerns.     -Your Care Team:  MD Shi Frazier PA-C Folake Falaki, MD Anoshirvan Mazhari, MD Kelly White, JACKY    General information about your clinic      Clinic hours:     Lab hours:  Phone [phone]  Monday 7:30 am-7 pm    Monday 8:30 am-6:30 pm  Tuesday-Friday 7:30 am-5 pm   Tuesday-Friday 8:30 am-4:30 pm    Pharmacy hours:  Phone [phone]  Monday 8:30 am-7pm  Tuesday-Friday 8:30am-6 pm                                       Mych[employer] assistance [phone]        We would like to hear from you, how was your visit today?    Daisy Vazquez  Patient Information Supervisor   Patient Care Supervisor  [facility], and [facility], and [facility]  [phone]

## 2017-05-26 NOTE — PROGRESS NOTES
Having some depression.  Had some stress at work.  This has been improving lately, not interested in medication or counseling at this time.  Will be changing shifts to help cope with this.  Prenatal flowsheet information is reviewed.  Reportable signs and symptoms discussed.  OB u/s next week.  F/u in 4 weeks.

## 2017-05-26 NOTE — NURSING NOTE
"Chief Complaint   Patient presents with     Prenatal Care     Panel Management     HPV, Colposcopy, Maternal screen       Initial BP 92/58 (BP Location: Left arm, Patient Position: Chair, Cuff Size: Adult Large)  Pulse 108  Temp 97.9  F (36.6  C) (Temporal)  Resp 16  Ht 5' 6.53\" (1.69 m)  Wt 246 lb 6.4 oz (111.8 kg)  LMP 01/11/2017 (Exact Date)  BMI 39.14 kg/m2 Estimated body mass index is 39.14 kg/(m^2) as calculated from the following:    Height as of this encounter: 5' 6.53\" (1.69 m).    Weight as of this encounter: 246 lb 6.4 oz (111.8 kg).  Medication Reconciliation: fariha Larsen, CMA    "

## 2017-05-27 ASSESSMENT — PATIENT HEALTH QUESTIONNAIRE - PHQ9: SUM OF ALL RESPONSES TO PHQ QUESTIONS 1-9: 9

## 2017-05-30 ENCOUNTER — CARE COORDINATION (OUTPATIENT)
Dept: CARE COORDINATION | Facility: CLINIC | Age: 27
End: 2017-05-30

## 2017-05-30 NOTE — PROGRESS NOTES
Clinic Care Coordination Contact attempt #3  [facility]/Voicemail     Referral Source: ED Follow-Up  Clinical Data: Care Coordinator Outreach        Outreach attempted x 1. Unable to leave a message on voicemail with call back information and requested return call.     Plan: Last attempt to contact patient. She was seen by Dr Crisostomo for initial prenatal visit 5/26/17     Luiz Oropeza RN  Clinic Care Coordinator  [facility]  [phone]

## 2017-06-02 ENCOUNTER — RADIANT APPOINTMENT (OUTPATIENT)
Dept: ULTRASOUND IMAGING | Facility: OTHER | Age: 27
End: 2017-06-02
Attending: FAMILY MEDICINE
Payer: COMMERCIAL

## 2017-06-02 DIAGNOSIS — Z34.82 ENCOUNTER FOR SUPERVISION OF OTHER NORMAL PREGNANCY, SECOND TRIMESTER: ICD-10-CM

## 2017-06-02 PROCEDURE — 76805 OB US >/= 14 WKS SNGL FETUS: CPT

## 2017-06-06 ENCOUNTER — TELEPHONE (OUTPATIENT)
Dept: FAMILY MEDICINE | Facility: OTHER | Age: 27
End: 2017-06-06

## 2017-06-06 NOTE — TELEPHONE ENCOUNTER
Received call from Dr Sheffield regarding recent OB ultrasound , wanted to make sure the PCP/ OB is aware .   Ultrasound study is limited , but there is a nuchal cord , seen on multiple images      OBSTETRIC ULTRASOUND GREATER THAN 14 WEEKS COMPLETE SINGLE  6/2/2017  3:18 PM     HISTORY: Encounter for supervision of other normal pregnancy, second  trimester.     COMPARISON: OB ultrasound dated 3/22/2017.     FINDINGS:     Presentation: Cephalic.  Cardiac activity: 161 bpm. Regular rhythm.  Movement: Unremarkable.  Placenta: Posterior.  No evidence for placenta previa.  Cervical length: 4.7 cm.     Amniotic fluid: Unremarkable. AKIL: 11.4 cm.     Measured Parameters:       BPD:  4.5 cm  Age: 19 weeks 5 days.       HC:    17.6 cm  Age: 20 weeks 1 day.       AC:  16.4 cm  Age: 21 weeks 4 days.       FL:   3.0 cm  Age: 19 weeks 3 days.     Gestational age by current ultrasound measurement: 20 weeks 2 days,  corresponding to an JACINDA of 10/18/2017.     Gestational age based on the reported previously established due date:  20 weeks 2 days, corresponding to an JACINDA of 10/18/2017.     Estimated fetal weight: 352 grams, corresponding to the 51st  percentile based on the reported previously established due date.      Fetal anatomy survey: Anatomic survey is significantly limited due to  fetal positioning and patient body habitus.       Ventricular atrium: Unremarkable.       Cisterna magna: Unremarkable.       Cerebellum: Unremarkable.        Spine: Spine is significantly limited in evaluation due to fetal  positioning and could not be evaluated.       Stomach: Unremarkable.       Renal areas: Suboptimally seen and could not be completely  evaluated.       Bladder: Unremarkable.       Three-vessel cord: Suboptimally seen and could not be evaluated  or confirmed.       Cord insertion: Fetal cord insertion is suboptimally seen and  could not be evaluated. Placental cord insertion appears grossly  within normal limits        Four-chamber heart: Unremarkable.       Right ventricular outflow tracts: Suboptimally seen and could not  be evaluated.       Left ventricular outflow tracts: Suboptimally seen and could not  be evaluated.       Anterior abdominal wall: Suboptimally seen and could not be  evaluated.       Diaphragm: Suboptimally seen and could not be evaluated.       Profile and face: Unremarkable.       Nose and lips: Suboptimally seen and could not be evaluated.       Upper extremities: Suboptimally seen and could not be completely  evaluated due to fetal positioning.       Lower extremities: Suboptimally seen and could not be completely  evaluated due to fetal positioning.     There appears to be a nuchal cord seen on images 53-55 and 59-61.         IMPRESSION:    1. There is a single live intrauterine pregnancy.  2. Significantly limited fetal survey due to fetal positioning and due  to maternal body habitus.  3. findings concerning for nuchal cord are seen on multiple images.  4. No other definite abnormalities are identified.

## 2017-06-07 NOTE — TELEPHONE ENCOUNTER
Ultrasound did show a cord around baby's neck.  This is not terribly uncommon (Dr. Crisostomo had this at birth), but if you notice a decrease in fetal movement, that would be more concerning in your situation.  Also, we should repeat an ultrasound in a few weeks to re-evaluate (especially since they weren't able to see everything we like to evaluate at your ultrasound).      Otherwise, what was seen looked fine.

## 2017-06-07 NOTE — TELEPHONE ENCOUNTER
Spoke with pt and gave information below. Pt did not have any further questions/concerns.    Sally Shaikh CMA ([facility])

## 2017-06-23 ENCOUNTER — PRENATAL OFFICE VISIT (OUTPATIENT)
Dept: FAMILY MEDICINE | Facility: OTHER | Age: 27
End: 2017-06-23
Payer: COMMERCIAL

## 2017-06-23 VITALS
HEIGHT: 67 IN | RESPIRATION RATE: 16 BRPM | WEIGHT: 248.8 LBS | BODY MASS INDEX: 39.05 KG/M2 | DIASTOLIC BLOOD PRESSURE: 54 MMHG | TEMPERATURE: 97.1 F | SYSTOLIC BLOOD PRESSURE: 100 MMHG | HEART RATE: 112 BPM

## 2017-06-23 DIAGNOSIS — Z34.82 ENCOUNTER FOR SUPERVISION OF OTHER NORMAL PREGNANCY IN SECOND TRIMESTER: Primary | ICD-10-CM

## 2017-06-23 DIAGNOSIS — A08.4 VIRAL GASTROENTERITIS: ICD-10-CM

## 2017-06-23 PROCEDURE — 99207 ZZC COMPLICATED OB VISIT: CPT | Performed by: FAMILY MEDICINE

## 2017-06-23 ASSESSMENT — PAIN SCALES - GENERAL: PAINLEVEL: NO PAIN (0)

## 2017-06-23 NOTE — PROGRESS NOTES
Pt hasn't been feeling well since yesterday.  Had episode of emesis.  Doesn't really feel like morning sickness, had been feeling better for a month or two.  One episode of diarrhea.  Has had 3 episodes of vomiting.  Not sure she wants something for nausea.  Encouraged her to stay hydrated.  Prenatal flowsheet information is reviewed.  Discussed u/s findings of nuchal cord.  Also incomplete anatomic survey.  Will repeat u/s in a few weeks.  Discussed need to contact us if decreased fetal movement at any point.  Discussed PTL, PROM, and when to call or come in.  Discussed triple/quad screening.  She declines testing at this time.  Reportable signs and symptoms discussed.  F/u in 4 weeks.  Plan on Glucola at that visit.

## 2017-06-23 NOTE — NURSING NOTE
"Chief Complaint   Patient presents with     Prenatal Care     Panel Management       Initial /54 (BP Location: Left arm, Patient Position: Chair, Cuff Size: Adult Large)  Pulse 112  Temp 97.1  F (36.2  C) (Temporal)  Resp 16  Ht 5' 6.53\" (1.69 m)  Wt 248 lb 12.8 oz (112.9 kg)  LMP 01/11/2017 (Exact Date)  BMI 39.52 kg/m2 Estimated body mass index is 39.52 kg/(m^2) as calculated from the following:    Height as of this encounter: 5' 6.53\" (1.69 m).    Weight as of this encounter: 248 lb 12.8 oz (112.9 kg).  Medication Reconciliation: fariha Larsen, BEAR    "

## 2017-06-23 NOTE — LETTER
[facility]  [address] [address]  Phone: [phone]    June 23, 2017        Valarie Talavera  [address] [facility] [address]          To whom it may concern:    RE: Valarie Talavera    Patient was seen and treated today at our clinic and missed work yesterday.  She is ill, but should recover within a few days.  May need to take another day or two off work.    Please contact me for questions or concerns.      Sincerely,        Luis Crisostomo MD

## 2017-06-23 NOTE — MR AVS SNAPSHOT
After Visit Summary   6/23/2017    Valarie Talavera    MRN: [id number]           Patient Information     Date Of Birth          1990        Visit Information        Provider Department      6/23/2017 11:00 AM Luis Crisostomo MD [facility]        Today's Diagnoses     Encounter for supervision of other normal pregnancy in second trimester    -  1    Nuchal cord, not applicable or unspecified fetus          Care Instructions    Thank you for visiting [facility]    Stay hydrated.  Let me know if further worsening or not improving.    Plan on diabetes screen at next visit.    Let's repeat ultrasound in the next month as well.    Contact us or return if questions or concerns.     Please see me in 4 weeks for follow up.       If you had imaging scheduled please refer to your radiology prep sheet.    Appointment    Date_______________     Time_____________    Day:   M TU W TH F    With____________________________    Location_________________________    If you need medication refills, please contact your pharmacy 3 days before your prescriptions runs out. If you are out of refills, your pharmacy will contact contact the clinic.    Contact us or return if questions or concerns.     -Your Care Team:  MD Shi Frazier PA-C Folake Falaki, MD Anoshirvan Mazhari, MD Kelly White, CNP    General information about your clinic      Clinic hours:     Lab hours:  Phone [phone]  Monday 7:30 am-7 pm    Monday 8:30 am-6:30 pm  Tuesday-Friday 7:30 am-5 pm   Tuesday-Friday 8:30 am-4:30 pm    Pharmacy hours:  Phone [phone]  Monday 8:30 am-7pm  Tuesday-Friday 8:30am-6 pm                                       Mychart assistance [phone]        We would like to hear from you, how was your visit today?    Daisy Vazquez  Patient Information Supervisor   Patient Care Supervisor  [place], Jeovany Li, and Roderick  [facility] [place], Jeovany Li, and Vaughn [facility]  [phone]             Follow-ups after your visit        Your next 10 appointments already scheduled     Jul 05, 2017  1:00 PM CDT    OB SINGLE FOLLOW UP REPEAT with [id number]   [facility] [place] ([facility])    [address] [place] [address] [phone]           Please bring a list of your medicines (including vitamins, minerals and over-the-counter drugs). Also, tell your doctor about any allergies you may have. Wear comfortable clothes and leave your valuables at home.  If you re less than 20 weeks drink four 8-ounce glasses of fluid an hour before your exam. If you need to empty your bladder before your exam, try to release only a little urine. Then, drink another glass of fluid.  You may have up to two family members in the exam room. If you bring a small child, an adult must be there to care for him or her.  Please call the Imaging Department at your exam site with any questions.              Future tests that were ordered for you today     Open Future Orders        Priority Expected Expires Ordered    US OB Single Follow Up Repeat Routine  6/23/2018 6/23/2017            Who to contact     If you have questions or need follow up information about today's clinic visit or your schedule please contact [facility] directly at [phone].  Normal or non-critical lab and imaging results will be communicated to you by MyChart, letter or phone within 4 business days after the clinic has received the results. If you do not hear from us within 7 days, please contact the clinic through MyChart or phone. If you have a critical or abnormal lab result, we will notify you by phone as soon as possible.  Submit refill requests through [employer] or call your pharmacy and they will forward the refill request to us. Please allow 3 business days for your refill to be completed.          Additional  "Information About Your Visit        MyChart Information     [employer] lets you send messages to your doctor, view your test results, renew your prescriptions, schedule appointments and more. To sign up, go to [email or web address] . Click on \"Log in\" on the left side of the screen, which will take you to the Welcome page. Then click on \"Sign up Now\" on the right side of the page.     You will be asked to enter the access code listed below, as well as some personal information. Please follow the directions to create your username and password.     Your access code is: [email or web address]  Expires: 2017  6:44 PM     Your access code will  in 90 days. If you need help or a new code, please call your [facility] clinic or [phone].        Care EveryWhere ID     This is your [facility] EveryWhere ID. This could be used by other organizations to access your [facility] medical records  [id number]        Your Vitals Were     Pulse Temperature Respirations Height Last Period BMI (Body Mass Index)    112 97.1  F (36.2  C) (Temporal) 16 5' 6.53\" (1.69 m) 2017 (Exact Date) 39.52 kg/m2       Blood Pressure from Last 3 Encounters:   17 100/54   17 92/58   17 104/58    Weight from Last 3 Encounters:   17 248 lb 12.8 oz (112.9 kg)   17 246 lb 6.4 oz (111.8 kg)   17 249 lb 9.6 oz (113.2 kg)               Primary Care Provider Office Phone # Fax #    Luis Fazal Crisostomo [phone]       [facility] [address] [place] [address]        Equal Access to Services     YANA CASTELLANOS : Hadii ana laura Syed, wajeffda iva, qaybta kaalmasang cardoso, grant fine. So [facility] [phone].    ATENCIÓN: Si habla español, tiene a browne disposición servicios gratuitos de asistencia lingüística. Llame al [phone].    We comply with applicable federal civil rights laws and [place] laws. We do not discriminate on the basis of race, " color, national origin, age, disability sex, sexual orientation or gender identity.            Thank you!     Thank you for choosing [facility]  for your care. Our goal is always to provide you with excellent care. Hearing back from our patients is one way we can continue to improve our services. Please take a few minutes to complete the written survey that you may receive in the mail after your visit with us. Thank you!             Your Updated Medication List - Protect others around you: Learn how to safely use, store and throw away your medicines at www.disposemymeds.org.          This list is accurate as of: 6/23/17 11:18 AM.  Always use your most recent med list.                   Brand Name Dispense Instructions for use Diagnosis    PRENATAL VITAMINS PO      Take 1 tablet by mouth daily        VITAMIN D PO      Take 500 Units by mouth 2 times daily

## 2017-06-23 NOTE — PATIENT INSTRUCTIONS
Thank you for visiting [facility] [place]    Stay hydrated.  Let me know if further worsening or not improving.    Plan on diabetes screen at next visit.    Let's repeat ultrasound in the next month as well.    Contact us or return if questions or concerns.     Please see me in 4 weeks for follow up.       If you had imaging scheduled please refer to your radiology prep sheet.    Appointment    Date_______________     Time_____________    Day:   M TU W TH F    With____________________________    Location_________________________    If you need medication refills, please contact your pharmacy 3 days before your prescriptions runs out. If you are out of refills, your pharmacy will contact contact the clinic.    Contact us or return if questions or concerns.     -Your Care Team:  MD Shi Frazier PA-C Folake Falaki, MD Anoshirvan Mazhari, MD Kelly White, JACKY    General information about your clinic      Clinic hours:     Lab hours:  Phone [phone]  Monday 7:30 am-7 pm    Monday 8:30 am-6:30 pm  Tuesday-Friday 7:30 am-5 pm   Tuesday-Friday 8:30 am-4:30 pm    Pharmacy hours:  Phone [phone]  Monday 8:30 am-7pm  Tuesday-Friday 8:30am-6 pm                                       Mychart assistance [phone]        We would like to hear from you, how was your visit today?    Daisy Vazquez  Patient Information Supervisor   Patient Care Supervisor  [facility] [place], and [facility]  [phone]

## 2017-07-05 ENCOUNTER — RADIANT APPOINTMENT (OUTPATIENT)
Dept: ULTRASOUND IMAGING | Facility: OTHER | Age: 27
End: 2017-07-05
Attending: FAMILY MEDICINE
Payer: COMMERCIAL

## 2017-07-05 DIAGNOSIS — Z34.82 ENCOUNTER FOR SUPERVISION OF OTHER NORMAL PREGNANCY IN SECOND TRIMESTER: ICD-10-CM

## 2017-07-05 PROCEDURE — 76805 OB US >/= 14 WKS SNGL FETUS: CPT

## 2017-07-06 NOTE — PROGRESS NOTES
Ultrasound looks OK, but still with cord around the neck.  Let us know if any concerning signs/symptoms occur.    Have a nice day!    Dr. Crisostomo

## 2017-07-13 ENCOUNTER — PRENATAL OFFICE VISIT (OUTPATIENT)
Dept: FAMILY MEDICINE | Facility: OTHER | Age: 27
End: 2017-07-13
Payer: COMMERCIAL

## 2017-07-13 VITALS
WEIGHT: 252 LBS | HEART RATE: 104 BPM | BODY MASS INDEX: 39.55 KG/M2 | TEMPERATURE: 97.1 F | HEIGHT: 67 IN | RESPIRATION RATE: 16 BRPM | DIASTOLIC BLOOD PRESSURE: 50 MMHG | SYSTOLIC BLOOD PRESSURE: 104 MMHG

## 2017-07-13 DIAGNOSIS — R73.9 HYPERGLYCEMIA: ICD-10-CM

## 2017-07-13 DIAGNOSIS — Z34.82 ENCOUNTER FOR SUPERVISION OF OTHER NORMAL PREGNANCY IN SECOND TRIMESTER: Primary | ICD-10-CM

## 2017-07-13 LAB
GLUCOSE 1H P 50 G GLC PO SERPL-MCNC: 143 MG/DL (ref 60–129)
HGB BLD-MCNC: 12.4 G/DL (ref 11.7–15.7)

## 2017-07-13 PROCEDURE — 99207 ZZC PRENATAL VISIT: CPT | Performed by: FAMILY MEDICINE

## 2017-07-13 PROCEDURE — 86780 TREPONEMA PALLIDUM: CPT | Performed by: FAMILY MEDICINE

## 2017-07-13 PROCEDURE — 82950 GLUCOSE TEST: CPT | Performed by: FAMILY MEDICINE

## 2017-07-13 PROCEDURE — 36415 COLL VENOUS BLD VENIPUNCTURE: CPT | Performed by: FAMILY MEDICINE

## 2017-07-13 PROCEDURE — 00000218 ZZHCL STATISTIC OBHBG - HEMOGLOBIN: Performed by: FAMILY MEDICINE

## 2017-07-13 ASSESSMENT — PAIN SCALES - GENERAL: PAINLEVEL: MODERATE PAIN (5)

## 2017-07-13 ASSESSMENT — ANXIETY QUESTIONNAIRES
6. BECOMING EASILY ANNOYED OR IRRITABLE: SEVERAL DAYS
GAD7 TOTAL SCORE: 3
7. FEELING AFRAID AS IF SOMETHING AWFUL MIGHT HAPPEN: NOT AT ALL
1. FEELING NERVOUS, ANXIOUS, OR ON EDGE: NOT AT ALL
2. NOT BEING ABLE TO STOP OR CONTROL WORRYING: NOT AT ALL
5. BEING SO RESTLESS THAT IT IS HARD TO SIT STILL: SEVERAL DAYS
3. WORRYING TOO MUCH ABOUT DIFFERENT THINGS: NOT AT ALL

## 2017-07-13 ASSESSMENT — PATIENT HEALTH QUESTIONNAIRE - PHQ9: 5. POOR APPETITE OR OVEREATING: SEVERAL DAYS

## 2017-07-13 NOTE — PROGRESS NOTES
Repeat u/s showed persistent Nuchal.  Baby is also breech at this time.  No contractions, no bleeding, no LOF.  Plan repeat u/s in 4-8 weeks.  More trouble sleeping lately.  Will wake up, can't get back to sleep.  Discussed possible trial of melatonin or benadryl.    1 hour GTT done today.   Prenatal flowsheet information is reviewed.  Discussed kick counts and fetal movement.  Reportable signs and symptoms discussed.  F/u in 2-3 weeks.

## 2017-07-13 NOTE — PATIENT INSTRUCTIONS
Thank you for visiting [facility]    Consider trial of melatonin or benadryl if desired for sleep.    We'll let you know your lab results as soon as we can.     We'll follow up on ultrasound in 1-2 months.    Contact us or return if questions or concerns.     Please see me in 2-3 weeks for follow up.       If you had imaging scheduled please refer to your radiology prep sheet.    Appointment    Date_______________     Time_____________    Day:   M TU W TH F    With____________________________    Location_________________________    If you need medication refills, please contact your pharmacy 3 days before your prescriptions runs out. If you are out of refills, your pharmacy will contact contact the clinic.    Contact us or return if questions or concerns.     -Your Care Team:  MD Shi Frazier PA-C Folake Falaki, MD Anoshirvan Mazhari, MD Kelly White, CNP    General information about your clinic      Clinic hours:     Lab hours:  Phone [phone]  Monday 7:30 am-7 pm    Monday 8:30 am-6:30 pm  Tuesday-Friday 7:30 am-5 pm   Tuesday-Friday 8:30 am-4:30 pm    Pharmacy hours:  Phone [phone]  Monday 8:30 am-7pm  Tuesday-Friday 8:30am-6 pm                                       Mychart assistance [phone]        We would like to hear from you, how was your visit today?    Daisy Vazquez  Patient Information Supervisor   Patient Care Supervisor  [facility], and [facility], and [facility]  [phone]

## 2017-07-13 NOTE — NURSING NOTE
"Chief Complaint   Patient presents with     Prenatal Care     Panel Management     gct, hgb, phq       Initial /50 (BP Location: Left arm, Patient Position: Chair, Cuff Size: Adult Large)  Pulse 104  Temp 97.1  F (36.2  C) (Temporal)  Resp 16  Ht 5' 6.53\" (1.69 m)  Wt 252 lb (114.3 kg)  LMP 01/11/2017 (Exact Date)  BMI 40.03 kg/m2 Estimated body mass index is 40.03 kg/(m^2) as calculated from the following:    Height as of this encounter: 5' 6.53\" (1.69 m).    Weight as of this encounter: 252 lb (114.3 kg).  Medication Reconciliation: complete  Berenice Larsen, BEAR    "

## 2017-07-13 NOTE — MR AVS SNAPSHOT
After Visit Summary   7/13/2017    Valarie Talvaera    MRN: [id number]           Patient Information     Date Of Birth          1990        Visit Information        Provider Department      7/13/2017 1:00 PM Luis Crisostomo MD [facility]        Today's Diagnoses     Encounter for supervision of other normal pregnancy in second trimester    -  1    Nuchal cord, not applicable or unspecified fetus          Care Instructions    Thank you for visiting [facility]    Consider trial of melatonin or benadryl if desired for sleep.    We'll let you know your lab results as soon as we can.     We'll follow up on ultrasound in 1-2 months.    Contact us or return if questions or concerns.     Please see me in 2-3 weeks for follow up.       If you had imaging scheduled please refer to your radiology prep sheet.    Appointment    Date_______________     Time_____________    Day:   M TU W TH F    With____________________________    Location_________________________    If you need medication refills, please contact your pharmacy 3 days before your prescriptions runs out. If you are out of refills, your pharmacy will contact contact the clinic.    Contact us or return if questions or concerns.     -Your Care Team:  MD Shi Frazier PA-C Folake Falaki, MD Anoshirvan Mazhari, MD Kelly White, CNP    General information about your clinic      Clinic hours:     Lab hours:  Phone [phone]  Monday 7:30 am-7 pm    Monday 8:30 am-6:30 pm  Tuesday-Friday 7:30 am-5 pm   Tuesday-Friday 8:30 am-4:30 pm    Pharmacy hours:  Phone [phone]  Monday 8:30 am-7pm  Tuesday-Friday 8:30am-6 pm                                       Mychart assistance [phone]        We would like to hear from you, how was your visit today?    Daisy Vazquez  Patient Information Supervisor   Patient Care Supervisor  [place], jay Vaughn  "[facility] [place], [place], and [facility]  [phone]            Follow-ups after your visit        Who to contact     If you have questions or need follow up information about today's clinic visit or your schedule please contact [facility] directly at [phone].  Normal or non-critical lab and imaging results will be communicated to you by MyChart, letter or phone within 4 business days after the clinic has received the results. If you do not hear from us within 7 days, please contact the clinic through [employer] or phone. If you have a critical or abnormal lab result, we will notify you by phone as soon as possible.  Submit refill requests through [employer] or call your pharmacy and they will forward the refill request to us. Please allow 3 business days for your refill to be completed.          Additional Information About Your Visit        [employer] Information     [employer] lets you send messages to your doctor, view your test results, renew your prescriptions, schedule appointments and more. To sign up, go to [email or web address] . Click on \"Log in\" on the left side of the screen, which will take you to the Welcome page. Then click on \"Sign up Now\" on the right side of the page.     You will be asked to enter the access code listed below, as well as some personal information. Please follow the directions to create your username and password.     Your access code is: [email or web address]  Expires: 2017  6:44 PM     Your access code will  in 90 days. If you need help or a new code, please call your [place] clinic or [phone].        Care EveryWhere ID     This is your Care EveryWhere ID. This could be used by other organizations to access your [place] medical records  [id number]        Your Vitals Were     Pulse Temperature Respirations Height Last Period BMI (Body Mass Index)    104 97.1  F (36.2  C) (Temporal) 16 5' 6.53\" (1.69 m) 2017 (Exact Date) " 40.03 kg/m2       Blood Pressure from Last 3 Encounters:   07/13/17 104/50   06/23/17 100/54   05/26/17 92/58    Weight from Last 3 Encounters:   07/13/17 252 lb (114.3 kg)   06/23/17 248 lb 12.8 oz (112.9 kg)   05/26/17 246 lb 6.4 oz (111.8 kg)              We Performed the Following     Anti Treponema     Glucose Challenge Test (GCT) 1 Hour     OB Hemoglobin        Primary Care Provider Office Phone # Fax #    Luis Crisostomo [phone]       [facility] [address] [place] [address]        Equal Access to Services     [facility] JASMIN : Hadii ana laura davis Sobryant, waaxda luqjaden, qaybta kaalmada janae, grant fine. So [facility] [phone].    ATENCIÓN: Si habla español, tiene a browne disposición servicios gratuitos de asistencia lingüística. Llame al [phone].    We comply with applicable federal civil rights laws and [place] laws. We do not discriminate on the basis of race, color, national origin, age, disability sex, sexual orientation or gender identity.            Thank you!     Thank you for choosing [facility]  for your care. Our goal is always to provide you with excellent care. Hearing back from our patients is one way we can continue to improve our services. Please take a few minutes to complete the written survey that you may receive in the mail after your visit with us. Thank you!             Your Updated Medication List - Protect others around you: Learn how to safely use, store and throw away your medicines at www.disposemymeds.org.          This list is accurate as of: 7/13/17  1:12 PM.  Always use your most recent med list.                   Brand Name Dispense Instructions for use Diagnosis    PRENATAL VITAMINS PO      Take 1 tablet by mouth daily        VITAMIN D PO      Take 500 Units by mouth 2 times daily

## 2017-07-14 ENCOUNTER — TELEPHONE (OUTPATIENT)
Dept: FAMILY MEDICINE | Facility: OTHER | Age: 27
End: 2017-07-14

## 2017-07-14 LAB — T PALLIDUM IGG+IGM SER QL: NEGATIVE

## 2017-07-14 ASSESSMENT — ANXIETY QUESTIONNAIRES: GAD7 TOTAL SCORE: 3

## 2017-07-14 ASSESSMENT — PATIENT HEALTH QUESTIONNAIRE - PHQ9: SUM OF ALL RESPONSES TO PHQ QUESTIONS 1-9: 4

## 2017-07-14 NOTE — PROGRESS NOTES
Your diabetes screen was positive.  This does not mean that you definitely have gestational diabetes, but we need to do a 3-hr test to verify if you have gestation diabetes or not.  Please schedule this as soon as convenient in the next week or two.  The orders have been placed.  If this confirms gestational diabetes, then we'll meet again or talk by phone to determine how to manage it best in your situation.    Have a nice day!    Dr. Crisostomo

## 2017-07-14 NOTE — TELEPHONE ENCOUNTER
Gave patient results, scheduled for lab on 7/17/2017 for 3 hour glucose  Closing encounter  Ellen Kian RT (R)        Notes Recorded by Luis Crisostomo MD on 7/13/2017 at 8:46 PM  Your diabetes screen was positive.  This does not mean that you definitely have gestational diabetes, but we need to do a 3-hr test to verify if you have gestation diabetes or not.  Please schedule this as soon as convenient in the next week or two.  The orders have been placed.  If this confirms gestational diabetes, then we'll meet again or talk by phone to determine how to manage it best in your situation.    Have a nice day!    Dr. Crisostomo

## 2017-07-17 DIAGNOSIS — Z34.82 ENCOUNTER FOR SUPERVISION OF OTHER NORMAL PREGNANCY IN SECOND TRIMESTER: Primary | ICD-10-CM

## 2017-07-17 LAB
GLUCOSE 1H P 100 G GLC PO SERPL-MCNC: 170 MG/DL (ref 60–179)
GLUCOSE 2H P 100 G GLC PO SERPL-MCNC: 151 MG/DL (ref 60–154)
GLUCOSE 3H P 100 G GLC PO SERPL-MCNC: 64 MG/DL (ref 60–139)
GLUCOSE P FAST SERPL-MCNC: 79 MG/DL (ref 60–94)

## 2017-07-17 PROCEDURE — 82951 GLUCOSE TOLERANCE TEST (GTT): CPT | Performed by: FAMILY MEDICINE

## 2017-07-17 PROCEDURE — 82952 GTT-ADDED SAMPLES: CPT | Performed by: FAMILY MEDICINE

## 2017-07-17 PROCEDURE — 36415 COLL VENOUS BLD VENIPUNCTURE: CPT | Performed by: FAMILY MEDICINE

## 2017-07-18 ENCOUNTER — TELEPHONE (OUTPATIENT)
Dept: FAMILY MEDICINE | Facility: OTHER | Age: 27
End: 2017-07-18

## 2017-07-18 NOTE — TELEPHONE ENCOUNTER
Spoke to patient gave her results, no further questions  Closing encounter  Ellen Langston RT (R)      Your confirmatory diabetes screen shows that you do NOT have gestational diabetes.  Continue to eat healthy to avoid worsening status.    Have a nice day!    Dr. Crisostomo

## 2017-07-18 NOTE — PROGRESS NOTES
Your confirmatory diabetes screen shows that you do NOT have gestational diabetes.  Continue to eat healthy to avoid worsening status.    Have a nice day!    Dr. Crisostomo

## 2017-08-03 ENCOUNTER — PRENATAL OFFICE VISIT (OUTPATIENT)
Dept: FAMILY MEDICINE | Facility: OTHER | Age: 27
End: 2017-08-03
Payer: COMMERCIAL

## 2017-08-03 VITALS
RESPIRATION RATE: 16 BRPM | HEART RATE: 88 BPM | WEIGHT: 258.4 LBS | BODY MASS INDEX: 40.56 KG/M2 | HEIGHT: 67 IN | SYSTOLIC BLOOD PRESSURE: 98 MMHG | DIASTOLIC BLOOD PRESSURE: 56 MMHG | TEMPERATURE: 97.6 F

## 2017-08-03 DIAGNOSIS — N81.89 PELVIC RELAXATION: ICD-10-CM

## 2017-08-03 DIAGNOSIS — Z34.82 ENCOUNTER FOR SUPERVISION OF OTHER NORMAL PREGNANCY IN SECOND TRIMESTER: Primary | ICD-10-CM

## 2017-08-03 DIAGNOSIS — Z23 NEED FOR VACCINATION: ICD-10-CM

## 2017-08-03 DIAGNOSIS — K21.9 GASTROESOPHAGEAL REFLUX DISEASE, ESOPHAGITIS PRESENCE NOT SPECIFIED: ICD-10-CM

## 2017-08-03 PROCEDURE — 90715 TDAP VACCINE 7 YRS/> IM: CPT | Performed by: FAMILY MEDICINE

## 2017-08-03 PROCEDURE — 90471 IMMUNIZATION ADMIN: CPT | Performed by: FAMILY MEDICINE

## 2017-08-03 PROCEDURE — 99207 ZZC COMPLICATED OB VISIT: CPT | Performed by: FAMILY MEDICINE

## 2017-08-03 ASSESSMENT — PAIN SCALES - GENERAL: PAINLEVEL: MODERATE PAIN (5)

## 2017-08-03 NOTE — MR AVS SNAPSHOT
After Visit Summary   8/3/2017    Valarie Talavera    MRN: [id number]           Patient Information     Date Of Birth          1990        Visit Information        Provider Department      8/3/2017 1:00 PM Luis Crisostomo MD [facility]        Today's Diagnoses     Encounter for supervision of other normal pregnancy in second trimester    -  1    Pelvic relaxation          Care Instructions    Thank you for visiting [facility]    Keep up the good work!    Try PT to help with your pelvic symptoms.  Can use Tylenol if needed.    Contact us or return if questions or concerns.     Please see me in 2 weeks for follow up.       If you had imaging scheduled please refer to your radiology prep sheet.    Appointment    Date_______________     Time_____________    Day:   M TU W TH F    With____________________________    Location_________________________    If you need medication refills, please contact your pharmacy 3 days before your prescriptions runs out. If you are out of refills, your pharmacy will contact contact the clinic.    Contact us or return if questions or concerns.     -Your Care Team:  MD Shi Frazier PA-C Folake Falaki, MD Anoshirvan Mazhari, MD Kelly White, JACKY    General information about your clinic      Clinic hours:     Lab hours:  Phone [phone]  Monday 7:30 am-7 pm    Monday 8:30 am-6:30 pm  Tuesday-Friday 7:30 am-5 pm   Tuesday-Friday 8:30 am-4:30 pm    Pharmacy hours:  Phone [phone]  Monday 8:30 am-7pm  Tuesday-Friday 8:30am-6 pm                                       Mychart assistance [phone]        We would like to hear from you, how was your visit today?    Daisy Vazquez  Patient Information Supervisor   Patient Care Supervisor  [facility] [place], and [facility] [place], and [facility]  [phone]             Follow-ups after  "your visit        Additional Services     PHYSICAL THERAPY REFERRAL       *This therapy referral will be filtered to a centralized scheduling office at [facility] and the patient will receive a call to schedule an appointment at a [facility] location most convenient for them. *     [facility] provides Physical Therapy evaluation and treatment and many specialty services across the [facility] system.  If requesting a specialty program, please choose from the list below.    If you have not heard from the scheduling office within 2 business days, please call [phone] for all locations, with the exception of [place], please call [phone].  Treatment: Evaluation & Treatment  Special Instructions/Modalities: eval and treat  Special Programs: as indicated.    Please be aware that coverage of these services is subject to the terms and limitations of your health insurance plan.  Call member services at your health plan with any benefit or coverage questions.      **Note to Provider:  If you are referring outside of [facility] for the therapy appointment, please list the name of the location in the \"special instructions\" above, print the referral and give to the patient to schedule the appointment.                  Who to contact     If you have questions or need follow up information about today's clinic visit or your schedule please contact [facility] directly at [phone].  Normal or non-critical lab and imaging results will be communicated to you by MyChart, letter or phone within 4 business days after the clinic has received the results. If you do not hear from us within 7 days, please contact the clinic through MyChart or phone. If you have a critical or abnormal lab result, we will notify you by phone as soon as possible.  Submit refill requests through [employer] or call your pharmacy and they will forward the refill request to us. Please allow 3 business " "days for your refill to be completed.          Additional Information About Your Visit        MyChart Information     [employer] lets you send messages to your doctor, view your test results, renew your prescriptions, schedule appointments and more. To sign up, go to [email or web address] . Click on \"Log in\" on the left side of the screen, which will take you to the Welcome page. Then click on \"Sign up Now\" on the right side of the page.     You will be asked to enter the access code listed below, as well as some personal information. Please follow the directions to create your username and password.     Your access code is: [email or web address]  Expires: 2017  6:44 PM     Your access code will  in 90 days. If you need help or a new code, please call your [place] clinic or [phone].        Care EveryWhere ID     This is your [facility] EveryWhere ID. This could be used by other organizations to access your [place] medical records  [id number]        Your Vitals Were     Pulse Temperature Respirations Height Last Period BMI (Body Mass Index)    88 97.6  F (36.4  C) (Temporal) 16 5' 6.53\" (1.69 m) 2017 (Exact Date) 41.04 kg/m2       Blood Pressure from Last 3 Encounters:   17 98/56   17 104/50   17 100/54    Weight from Last 3 Encounters:   17 258 lb 6.4 oz (117.2 kg)   17 252 lb (114.3 kg)   17 248 lb 12.8 oz (112.9 kg)              We Performed the Following     PHYSICAL THERAPY REFERRAL        Primary Care Provider Office Phone # Fax #    Luis Crisostomo [phone]       [facility] [address] [place] [address]        Equal Access to Services     JOVITA CASTELLANOS : Kevin Syed, wajeffda luremedios, qaybta kaalmegan cardoso, grant fine. [facility] [phone].    ATENCIÓN: Si habla español, tiene a browne disposición servicios gratuitos de asistencia lingüística. Llame al [phone].    We " comply with applicable federal civil rights laws and [place] laws. We do not discriminate on the basis of race, color, national origin, age, disability sex, sexual orientation or gender identity.            Thank you!     Thank you for choosing [facility]  for your care. Our goal is always to provide you with excellent care. Hearing back from our patients is one way we can continue to improve our services. Please take a few minutes to complete the written survey that you may receive in the mail after your visit with us. Thank you!             Your Updated Medication List - Protect others around you: Learn how to safely use, store and throw away your medicines at www.disposemymeds.org.          This list is accurate as of: 8/3/17  1:26 PM.  Always use your most recent med list.                   Brand Name Dispense Instructions for use Diagnosis    PRENATAL VITAMINS PO      Take 1 tablet by mouth daily        VITAMIN D PO      Take 500 Units by mouth 2 times daily

## 2017-08-03 NOTE — PROGRESS NOTES
No leaking, no bleeding, no ctx.  Does have a lot of bilateral hip pain.  Hurts to drive or sit for more than 1/2 hours.  Would be OK seeing PT since this is starting to affect work a bit.  Ordered.  Will treat GERD today.  uncertain position confirmed by Leopold maneuvers.  Unsure about birth control after delivery.  Has tried OCP, Nexplanon, Mirena.  Tdap today.  Prenatal flowsheet information is reviewed.  Discussed PTL, PROM, and when to call or come in.  Reportable signs and symptoms discussed.  Having some reflux, would like something for this.  Not sleeping well.  Plan on repeat ultrasound for nuchal cord/breech at next visit.  F/u in 2 weeks.

## 2017-08-03 NOTE — NURSING NOTE
Prior to injection verified patient identity using patient's name and date of birth.  Screening Questionnaire for Adult Immunization    Are you sick today?   No   Do you have allergies to medications, food, a vaccine component or latex?   No   Have you ever had a serious reaction after receiving a vaccination?   No   Do you have a long-term health problem with heart disease, lung disease, asthma, kidney disease, metabolic disease (e.g. diabetes), anemia, or other blood disorder?   No   Do you have cancer, leukemia, HIV/AIDS, or any other immune system problem?   No   In the past 3 months, have you taken medications that affect  your immune system, such as prednisone, other steroids, or anticancer drugs; drugs for the treatment of rheumatoid arthritis, Crohn s disease, or psoriasis; or have you had radiation treatments?   No   Have you had a seizure, or a brain or other nervous system problem?   No   During the past year, have you received a transfusion of blood or blood     products, or been given immune (gamma) globulin or antiviral drug?   No   For women: Are you pregnant or is there a chance you could become        pregnant during the next month?   No   Have you received any vaccinations in the past 4 weeks?   No     Immunization questionnaire answers were all negative.      MNVFC doesn't apply on this patient    Per orders of Dr. Crisostomo, injection of Tdap given by Berenice Larsen. Patient instructed to remain in clinic for 15 minutes afterwards, and to report any adverse reaction to me immediately.       Screening performed by Berenice Larsen on 8/3/2017 at 1:35 PM.  Berenice Larsen, [facility]

## 2017-08-03 NOTE — NURSING NOTE
"Chief Complaint   Patient presents with     Prenatal Care     Panel Management     tdap       Initial BP 98/56 (BP Location: Left arm, Patient Position: Chair, Cuff Size: Adult Large)  Pulse 88  Temp 97.6  F (36.4  C) (Temporal)  Resp 16  Ht 5' 6.53\" (1.69 m)  Wt 258 lb 6.4 oz (117.2 kg)  LMP 01/11/2017 (Exact Date)  BMI 41.04 kg/m2 Estimated body mass index is 41.04 kg/(m^2) as calculated from the following:    Height as of this encounter: 5' 6.53\" (1.69 m).    Weight as of this encounter: 258 lb 6.4 oz (117.2 kg).  Medication Reconciliation: fariha Larsen, CMA    "

## 2017-08-03 NOTE — PATIENT INSTRUCTIONS
Thank you for visiting [facility]    Keep up the good work!    Try PT to help with your pelvic symptoms.  Can use Tylenol if needed.    Contact us or return if questions or concerns.     Please see me in 2 weeks for follow up.       If you had imaging scheduled please refer to your radiology prep sheet.    Appointment    Date_______________     Time_____________    Day:   M TU W TH F    With____________________________    Location_________________________    If you need medication refills, please contact your pharmacy 3 days before your prescriptions runs out. If you are out of refills, your pharmacy will contact contact the clinic.    Contact us or return if questions or concerns.     -Your Care Team:  MD Shi Frazier PA-C Folake Falaki, MD Anoshirvan Mazhari, MD Kelly White, CNP    General information about your clinic      Clinic hours:     Lab hours:  Phone [phone]  Monday 7:30 am-7 pm    Monday 8:30 am-6:30 pm  Tuesday-Friday 7:30 am-5 pm   Tuesday-Friday 8:30 am-4:30 pm    Pharmacy hours:  Phone [phone]  Monday 8:30 am-7pm  Tuesday-Friday 8:30am-6 pm                                       Mychart assistance [phone]        We would like to hear from you, how was your visit today?    Daisy Vazquez  Patient Information Supervisor   Patient Care Supervisor  [facility] [place], and [facility], and [facility]  [phone]

## 2017-08-05 ENCOUNTER — HEALTH MAINTENANCE LETTER (OUTPATIENT)
Age: 27
End: 2017-08-05

## 2017-08-16 ENCOUNTER — RADIANT APPOINTMENT (OUTPATIENT)
Dept: ULTRASOUND IMAGING | Facility: OTHER | Age: 27
End: 2017-08-16
Attending: FAMILY MEDICINE
Payer: COMMERCIAL

## 2017-08-16 ENCOUNTER — PRENATAL OFFICE VISIT (OUTPATIENT)
Dept: FAMILY MEDICINE | Facility: OTHER | Age: 27
End: 2017-08-16
Payer: COMMERCIAL

## 2017-08-16 VITALS
SYSTOLIC BLOOD PRESSURE: 100 MMHG | RESPIRATION RATE: 16 BRPM | HEIGHT: 67 IN | BODY MASS INDEX: 39.87 KG/M2 | HEART RATE: 108 BPM | WEIGHT: 254 LBS | DIASTOLIC BLOOD PRESSURE: 56 MMHG | TEMPERATURE: 98 F

## 2017-08-16 DIAGNOSIS — Z34.82 ENCOUNTER FOR SUPERVISION OF OTHER NORMAL PREGNANCY IN SECOND TRIMESTER: Primary | ICD-10-CM

## 2017-08-16 DIAGNOSIS — Z34.82 ENCOUNTER FOR SUPERVISION OF OTHER NORMAL PREGNANCY IN SECOND TRIMESTER: ICD-10-CM

## 2017-08-16 PROCEDURE — 76816 OB US FOLLOW-UP PER FETUS: CPT

## 2017-08-16 PROCEDURE — 99207 ZZC PRENATAL VISIT: CPT | Performed by: FAMILY MEDICINE

## 2017-08-16 ASSESSMENT — PAIN SCALES - GENERAL: PAINLEVEL: NO PAIN (0)

## 2017-08-16 NOTE — PROGRESS NOTES
Unsure why she lost weight.  No bleeding, no LOF, no contractions.  Cephalic position confirmed by Leopold maneuvers.  Prenatal flowsheet information is reviewed.  Reportable signs and symptoms discussed.  Unsure about birth control choice after delivery.     Repeat u/s in the next couple of weeks to follow nuchal cord, verify presentation.  F/u in 2 weeks.

## 2017-08-16 NOTE — NURSING NOTE
"Chief Complaint   Patient presents with     Prenatal Care     Panel Management       Initial /56 (BP Location: Left arm, Patient Position: Chair, Cuff Size: Adult Large)  Pulse 108  Temp 98  F (36.7  C) (Temporal)  Resp 16  Ht 5' 6.53\" (1.69 m)  Wt 254 lb (115.2 kg)  LMP 01/11/2017 (Exact Date)  BMI 40.35 kg/m2 Estimated body mass index is 40.35 kg/(m^2) as calculated from the following:    Height as of this encounter: 5' 6.53\" (1.69 m).    Weight as of this encounter: 254 lb (115.2 kg).  Medication Reconciliation: complete  Berenice Welch, BEAR    "

## 2017-08-16 NOTE — MR AVS SNAPSHOT
After Visit Summary   8/16/2017    Valarie Talavera    MRN: [id number]           Patient Information     Date Of Birth          1990        Visit Information        Provider Department      8/16/2017 10:00 AM Luis Crisostomo MD [facility]        Today's Diagnoses     Encounter for supervision of other normal pregnancy in second trimester    -  1    Nuchal cord, not applicable or unspecified fetus        Breech presentation of fetus          Care Instructions    Thank you for visiting [facility]    Let's follow up with your ultrasound.    Please see me in 2 weeks for follow up.       If you had imaging scheduled please refer to your radiology prep sheet.    Appointment    Date_______________     Time_____________    Day:   M TU W TH F    With____________________________    Location_________________________    If you need medication refills, please contact your pharmacy 3 days before your prescriptions runs out. If you are out of refills, your pharmacy will contact contact the clinic.    Contact us or return if questions or concerns.     -Your Care Team:  MD Shi Frazier PA-C Folake Falaki, MD Anoshirvan Mazhari, MD Kelly White, JACKY    General information about your clinic      Clinic hours:     Lab hours:  Phone [phone]  Monday 7:30 am-7 pm    Monday 8:30 am-6:30 pm  Tuesday-Friday 7:30 am-5 pm   Tuesday-Friday 8:30 am-4:30 pm    Pharmacy hours:  Phone [phone]  Monday 8:30 am-7pm  Tuesday-Friday 8:30am-6 pm                                       Mychart assistance [phone]        We would like to hear from you, how was your visit today?    Daisy Vazquez  Patient Information Supervisor   Patient Care Supervisor  [facility] [place], and \[facility] [place] [facility]\"", and [facility]  [phone]             Follow-ups after your visit        Your next 10  appointments already scheduled     Aug 16, 2017  1:40 PM CDT   [facility] OB SINGLE FO[facility] UP REPEAT [id number]    [address] [address] [phone]           Please bring a list of your medicines (including vitamins, minerals and over-the-counter drugs). Also, tell your doctor about any allergies you may have. Wear comfortable clothes and leave your valuables at home.  If you re less than 20 weeks drink four 8-ounce glasses of fluid an hour before your exam. If you need to empty your bladder before your exam, try to release only a little urine. Then, drink another glass of fluid.  You may have up to two family members in the exam room. If you bring a small child, an adult must be there to care for him or her.  Please call the Imaging Department at your exam site with any questions.            Sep 01, 2017  8:00 AM CDT   ESTABL[facility] PRENATAL with [facility]    [address] [address] [phone]              Future tests that were ordered for you today     Open Future Orders        Priority Expected Expires Ordered    US OB Single Follow Up Repeat Routine  8/16/2018 8/16/2017            Who to contact     If you have questions or need follow up information about today's clinic visit or your schedule please contact [facility] directly at [phone].  Normal or non-critical lab and imaging results will be communicated to you by MyChart, letter or phone within 4 business days after the clinic has received the results. If you do not hear from us within 7 days, please contact the clinic through MyChart or phone. If you have a critical or abnormal lab result, we will notify you by phone as soon as possible.  Submit refill requests through [employer] or call your pharmacy and they will forward the refill request to us. Please allow 3  "business days for your refill to be completed.          Additional Information About Your Visit        MyChart Information     [employer] lets you send messages to your doctor, view your test results, renew your prescriptions, schedule appointments and more. To sign up, go to [email or web address] . Click on \"Log in\" on the left side of the screen, which will take you to the Welcome page. Then click on \"Sign up Now\" on the right side of the page.     You will be asked to enter the access code listed below, as well as some personal information. Please follow the directions to create your username and password.     Your access code is: [email or web address]  Expires: 2017 10:37 AM     Your access code will  in 90 days. If you need help or a new code, please call your [place] clinic or [phone].        Care EveryWhere ID     This is your Care EveryWhere ID. This could be used by other organizations to access your [place] medical records  [id number]        Your Vitals Were     Pulse Temperature Respirations Height Last Period BMI (Body Mass Index)    108 98  F (36.7  C) (Temporal) 16 5' 6.53\" (1.69 m) 2017 (Exact Date) 40.35 kg/m2       Blood Pressure from Last 3 Encounters:   17 100/56   17 98/56   17 104/50    Weight from Last 3 Encounters:   17 254 lb (115.2 kg)   17 258 lb 6.4 oz (117.2 kg)   17 252 lb (114.3 kg)               Primary Care Provider Office Phone # Fax #    Luis Crisostomo [phone] [address] [place] [address]        Equal Access to Services     JOVITA CASTELLANOS : Kevin Syed, samantha enrique, subhash de pazalgrant cody. So [facility] [phone].    ATENCIÓN: Si habla español, tiene a browne disposición servicios gratuitos de asistencia lingüística. Llame al [phone].    We comply with applicable federal civil rights laws and [place] laws. We do not " discriminate on the basis of race, color, national origin, age, disability sex, sexual orientation or gender identity.            Thank you!     Thank you for choosing [facility]  for your care. Our goal is always to provide you with excellent care. Hearing back from our patients is one way we can continue to improve our services. Please take a few minutes to complete the written survey that you may receive in the mail after your visit with us. Thank you!             Your Updated Medication List - Protect others around you: Learn how to safely use, store and throw away your medicines at www.disposemymeds.org.          This list is accurate as of: 8/16/17 11:09 AM.  Always use your most recent med list.                   Brand Name Dispense Instructions for use Diagnosis    PRENATAL VITAMINS PO      Take 1 tablet by mouth daily        ranitidine 150 MG tablet    ZANTAC    60 tablet    Take 1 tablet (150 mg) by mouth 2 times daily    Gastroesophageal reflux disease, esophagitis presence not specified       VITAMIN D PO      Take 500 Units by mouth 2 times daily

## 2017-08-16 NOTE — PATIENT INSTRUCTIONS
Thank you for visiting [facility]    Let's follow up with your ultrasound.    Please see me in 2 weeks for follow up.       If you had imaging scheduled please refer to your radiology prep sheet.    Appointment    Date_______________     Time_____________    Day:   M TU W TH F    With____________________________    Location_________________________    If you need medication refills, please contact your pharmacy 3 days before your prescriptions runs out. If you are out of refills, your pharmacy will contact contact the clinic.    Contact us or return if questions or concerns.     -Your Care Team:  MD Shi Frazier PA-C Folake Falaki, MD Anoshirvan Mazhari, MD Kelly White, CNP    General information about your clinic      Clinic hours:     Lab hours:  Phone [phone]  Monday 7:30 am-7 pm    Monday 8:30 am-6:30 pm  Tuesday-Friday 7:30 am-5 pm   Tuesday-Friday 8:30 am-4:30 pm    Pharmacy hours:  Phone [phone]  Monday 8:30 am-7pm  Tuesday-Friday 8:30am-6 pm                                       Mychart assistance [phone]        We would like to hear from you, how was your visit today?    Daisy Vazquez  Patient Information Supervisor   Patient Care Supervisor  [facility] [place], and [facility] [place], and [facility]  [phone]

## 2017-08-17 ENCOUNTER — TELEPHONE (OUTPATIENT)
Dept: FAMILY MEDICINE | Facility: OTHER | Age: 27
End: 2017-08-17

## 2017-08-17 NOTE — PROGRESS NOTES
Your baby looks OK, still with the cord around the neck, but otherwise doing fine.    Thanks,    Dr. Crisostomo

## 2017-08-17 NOTE — TELEPHONE ENCOUNTER
Spoke with pt and gave results below:    Notes Recorded by Luis Crisostomo MD on 8/16/2017 at 9:45 PM  Your baby looks OK, still with the cord around the neck, but otherwise doing fine.    Thanks,    Dr. Crisostomo      No questions/concerns at time of call.    Sally Shaikh CMA ([facility])

## 2017-08-23 ENCOUNTER — NURSE TRIAGE (OUTPATIENT)
Dept: NURSING | Facility: CLINIC | Age: 27
End: 2017-08-23

## 2017-08-23 ENCOUNTER — HOSPITAL ENCOUNTER (OUTPATIENT)
Facility: CLINIC | Age: 27
Discharge: HOME | End: 2017-08-23
Attending: FAMILY MEDICINE | Admitting: FAMILY MEDICINE
Payer: COMMERCIAL

## 2017-08-23 VITALS — DIASTOLIC BLOOD PRESSURE: 61 MMHG | SYSTOLIC BLOOD PRESSURE: 120 MMHG | TEMPERATURE: 98.4 F

## 2017-08-23 PROBLEM — M54.9 BACK PAIN: Status: ACTIVE | Noted: 2017-08-23

## 2017-08-23 LAB
A1 MICROGLOB PLACENTAL VAG QL: NEGATIVE
ALBUMIN SERPL-MCNC: 2.7 G/DL (ref 3.4–5)
ALBUMIN UR-MCNC: NEGATIVE MG/DL
ALP SERPL-CCNC: 77 U/L (ref 40–150)
ALT SERPL W P-5'-P-CCNC: 21 U/L (ref 0–50)
ANION GAP SERPL CALCULATED.3IONS-SCNC: 9 MMOL/L (ref 3–14)
APPEARANCE UR: ABNORMAL
AST SERPL W P-5'-P-CCNC: 11 U/L (ref 0–45)
BACTERIA #/AREA URNS HPF: ABNORMAL /HPF
BILIRUB SERPL-MCNC: 0.3 MG/DL (ref 0.2–1.3)
BILIRUB UR QL STRIP: NEGATIVE
BUN SERPL-MCNC: 7 MG/DL (ref 7–30)
CALCIUM SERPL-MCNC: 8.6 MG/DL (ref 8.5–10.1)
CHLORIDE SERPL-SCNC: 106 MMOL/L (ref 94–109)
CO2 SERPL-SCNC: 24 MMOL/L (ref 20–32)
COLOR UR AUTO: YELLOW
CREAT SERPL-MCNC: 0.49 MG/DL (ref 0.52–1.04)
CREAT UR-MCNC: 53 MG/DL
ERYTHROCYTE [DISTWIDTH] IN BLOOD BY AUTOMATED COUNT: 14.4 % (ref 10–15)
GFR SERPL CREATININE-BSD FRML MDRD: >90 ML/MIN/1.7M2
GLUCOSE SERPL-MCNC: 94 MG/DL (ref 70–99)
GLUCOSE UR STRIP-MCNC: NEGATIVE MG/DL
HCT VFR BLD AUTO: 37.1 % (ref 35–47)
HGB BLD-MCNC: 12.4 G/DL (ref 11.7–15.7)
HGB UR QL STRIP: NEGATIVE
KETONES UR STRIP-MCNC: NEGATIVE MG/DL
LEUKOCYTE ESTERASE UR QL STRIP: ABNORMAL
MCH RBC QN AUTO: 28.9 PG (ref 26.5–33)
MCHC RBC AUTO-ENTMCNC: 33.4 G/DL (ref 31.5–36.5)
MCV RBC AUTO: 87 FL (ref 78–100)
MUCOUS THREADS #/AREA URNS LPF: PRESENT /LPF
NITRATE UR QL: NEGATIVE
PH UR STRIP: 8 PH (ref 5–7)
PLATELET # BLD AUTO: 255 10E9/L (ref 150–450)
POTASSIUM SERPL-SCNC: 3.8 MMOL/L (ref 3.4–5.3)
PROT SERPL-MCNC: 6.6 G/DL (ref 6.8–8.8)
PROT UR-MCNC: 0.06 G/L
PROT/CREAT 24H UR: 0.12 G/G CR (ref 0–0.2)
RBC # BLD AUTO: 4.29 10E12/L (ref 3.8–5.2)
RBC #/AREA URNS AUTO: <1 /HPF (ref 0–2)
SODIUM SERPL-SCNC: 139 MMOL/L (ref 133–144)
SOURCE: ABNORMAL
SP GR UR STRIP: 1.01 (ref 1–1.03)
SPECIMEN SOURCE: ABNORMAL
SQUAMOUS #/AREA URNS AUTO: 11 /HPF (ref 0–1)
UROBILINOGEN UR STRIP-MCNC: 0 MG/DL (ref 0–2)
WBC # BLD AUTO: 11.6 10E9/L (ref 4–11)
WBC #/AREA URNS AUTO: 10 /HPF (ref 0–2)
WET PREP SPEC: ABNORMAL

## 2017-08-23 PROCEDURE — 80053 COMPREHEN METABOLIC PANEL: CPT | Performed by: FAMILY MEDICINE

## 2017-08-23 PROCEDURE — 59025 FETAL NON-STRESS TEST: CPT | Mod: 26 | Performed by: FAMILY MEDICINE

## 2017-08-23 PROCEDURE — 85027 COMPLETE CBC AUTOMATED: CPT | Performed by: FAMILY MEDICINE

## 2017-08-23 PROCEDURE — 99214 OFFICE O/P EST MOD 30 MIN: CPT | Mod: 25

## 2017-08-23 PROCEDURE — 84156 ASSAY OF PROTEIN URINE: CPT | Performed by: FAMILY MEDICINE

## 2017-08-23 PROCEDURE — 59025 FETAL NON-STRESS TEST: CPT

## 2017-08-23 PROCEDURE — 87210 SMEAR WET MOUNT SALINE/INK: CPT | Performed by: FAMILY MEDICINE

## 2017-08-23 PROCEDURE — 36415 COLL VENOUS BLD VENIPUNCTURE: CPT | Performed by: FAMILY MEDICINE

## 2017-08-23 PROCEDURE — 81001 URINALYSIS AUTO W/SCOPE: CPT | Performed by: FAMILY MEDICINE

## 2017-08-23 PROCEDURE — 25000132 ZZH RX MED GY IP 250 OP 250 PS 637: Performed by: FAMILY MEDICINE

## 2017-08-23 PROCEDURE — 84112 EVAL AMNIOTIC FLUID PROTEIN: CPT | Performed by: FAMILY MEDICINE

## 2017-08-23 PROCEDURE — 87086 URINE CULTURE/COLONY COUNT: CPT | Performed by: FAMILY MEDICINE

## 2017-08-23 RX ORDER — ONDANSETRON 2 MG/ML
4 INJECTION INTRAMUSCULAR; INTRAVENOUS EVERY 6 HOURS PRN
Status: DISCONTINUED | OUTPATIENT
Start: 2017-08-23 | End: 2017-08-23 | Stop reason: HOSPADM

## 2017-08-23 RX ORDER — FLUCONAZOLE 150 MG/1
150 TABLET ORAL ONCE
Status: COMPLETED | OUTPATIENT
Start: 2017-08-23 | End: 2017-08-23

## 2017-08-23 RX ORDER — ACETAMINOPHEN 325 MG/1
975 TABLET ORAL ONCE
Status: COMPLETED | OUTPATIENT
Start: 2017-08-23 | End: 2017-08-23

## 2017-08-23 RX ADMIN — ACETAMINOPHEN 975 MG: 325 TABLET ORAL at 16:39

## 2017-08-23 RX ADMIN — FLUCONAZOLE 150 MG: 150 TABLET ORAL at 16:40

## 2017-08-23 NOTE — PROGRESS NOTES
S:  Discharge from triage.   A:  FHT's 125, Moderate variability, Accels present, no decels noted. Contractions none. Tylenol given for back pain and HA, diflucan given for yeast. Pt says pain is decreasing.   R:  Written and verbal D/C instruction provided. Pt. Verbalized understanding of D/C instructions and will follow up with  as previously scheduled or earlier if symptoms persist..   Verbalizes understanding that she will call or return to the [facility] with any further questions or concerns.   Pt. D/C'd via ambulate with spouse    Nursing Discharge Checklist  Discharge order entered: Yes  Patient care order entered: Yes  Charges entered: Yes  IV start and stop times have been documented in Epic? NA  NST start and stop times have been documented in Epic Doc F/S? Yes

## 2017-08-23 NOTE — TELEPHONE ENCOUNTER
"Patient calling reporting severe headache with light sensitivity started at 5 a.m.. Patient reporting having difficulty walking, reporting having to hold onto wall. Bilat leg weakness. Reporting \"when the baby moves it makes me sick.\"  Hip pain. Nausea. Patient is 32 weeks gestation.    Dawn Cornell RN  [facility]      "

## 2017-08-23 NOTE — TELEPHONE ENCOUNTER
Reason for Disposition    Unable to walk, or can only walk with assistance (e.g., requires support)    Additional Information    Negative: Difficult to awaken or acting confused  (e.g., disoriented, slurred speech)    Negative: [1] Weakness of the face, arm or leg on one side of the body AND [2] new onset    Negative: [1] Numbness of the face, arm or leg on one side of the body AND [2] new onset    Negative: [1] Loss of speech or garbled speech AND [2] new onset    Negative: Sounds like a life-threatening emergency to the triager    Negative: Followed a head injury within last 3 days    Protocols used: PREGNANCY - HEADACHE-ADULT-AH

## 2017-08-23 NOTE — PROGRESS NOTES
contacted with lab results at 1620.Pt reporting pain 5/10. Tylenol was given per order for pain that pt is reporting as 5/10 back pain which is an improvement. Also diflucan ordered for yeast.  will see pt. Monitors off at this time.

## 2017-08-23 NOTE — PROGRESS NOTES
"S: Triage Arrival  B: Valarie is a 26 y.o.  @ 32w 0d who presents with complaint of headache pain along with hip pain and back pain.   A: EFM applied. FHT's130 with moderate variability, accels present, no decels noted on strip. Client reports clinic has diagnosed that baby has nuchal cord x 2.  Contractions not noted or reported. Also reports feeling some 'vaginal drainage\" over the last few days  . R: Will notify MD to obtain further orders.UA sent  "

## 2017-08-23 NOTE — IP AVS SNAPSHOT
[facility]    [address]     [place] [address]    Phone:  [phone]                                       After Visit Summary   8/23/2017    Valarie Talavera    MRN: [id number]           After Visit Summary Signature Page     I have received my discharge instructions, and my questions have been answered. I have discussed any challenges I see with this plan with the nurse or doctor.    ..........................................................................................................................................  Patient/Patient Representative Signature      ..........................................................................................................................................  Patient Representative Print Name and Relationship to Patient    ..................................................               ................................................  Date                                            Time    ..........................................................................................................................................  Reviewed by Signature/Title    ...................................................              ..............................................  Date                                                            Time

## 2017-08-23 NOTE — IP AVS SNAPSHOT
MRN:[id number]                      After Visit Summary   8/23/2017    Valarie Talavera    MRN: [id number]           Thank you!     Thank you for choosing [facility] for your care. Our goal is always to provide you with excellent care. Hearing back from our patients is one way we can continue to improve our services. Please take a few minutes to complete the written survey that you may receive in the mail after you visit with us. Thank you!        Patient Information     Date Of Birth          1990        Designated Caregiver       Most Recent Value    Caregiver    Will someone help with your care after discharge? no      About your hospital stay     You were admitted on:  August 23, 2017 You last received care in the:  [facility]    You were discharged on:  August 23, 2017       Who to Call     For medical emergencies, please call 911.  For non-urgent questions about your medical care, please call your primary care provider or clinic, [phone]          Attending Provider     Provider Specialty    Audelia Brar MD Family Practice       Primary Care Provider Office Phone # Fax #    Luis Crisostomo [phone]      Your next 10 appointments already scheduled     Sep 01, 2017  8:00 AM CDT   ESTABLISHED PRENATAL with Luis Crisostomo MD   [facility] ([facility]    [address] [address] [phone]              Further instructions from your care team                OB Triage Discharge Instructions    Diet:  Regular diet as tolerated    Activity:  As tolerated    Other Special Instructions: Follow up with  at next appointment    Reason for being seen in L&D: Headache/backache, r/o preeclampsia    Treatment/procedures performed in L&D: U/A, CBC, Metabolic screen, Amnisure, Wet prep    Call the [facility]place at [phone] if you have:  ? 5 or more contractions in one hour  ? Leaking of  "fluid from your vaginal area  ? Decreased fetal movement (follow kick count instructions)  ? Bleeding from your vaginal area  ? Swelling in your face, or increased swelling in your hands or legs  ? Headaches or vision problems such as blurring or seeing spots or starts  ? Nausea or vomiting lasting for more than 24 hours  ? An increase in weight (5lbs/week)  ? Epigastric pain (sometimes confused with heartburn that does not go away or a very bad stomach ache)    If you have any questions, please follow up with your doctor.        Patient Signature: ______________________________________________________________  By signing the above I acknowledge that a nurse or my care provider has explained the instruction to me and I have had any questions regarding my care explained to me.        Discharge Nurse Signature: _______________________________ 8/23/2017  5:13 PM    Method of discharge: ambulate    Accompanied by: spouse  Time of discharge: 1720      Pt was seen in birthplace today 08/23/17. Pt should not work today.     Pending Results     Date and Time Order Name Status Description    8/23/2017 1350 Urine Culture Aerobic Bacterial In process             Admission Information     Date & Time Provider Department Dept. Phone    8/23/2017 Audelia Brar MD [facility] [phone]      Your Vitals Were     Blood Pressure Temperature Last Period             120/61 98.4  F (36.9  C) (Oral) 01/11/2017 (Exact Date)         MyChart Information     [employer] lets you send messages to your doctor, view your test results, renew your prescriptions, schedule appointments and more. To sign up, go to [email or web address] . Click on \"Log in\" on the left side of the screen, which will take you to the Welcome page. Then click on \"Sign up Now\" on the right side of the page.     You will be asked to enter the access code listed below, as well as some personal information. Please follow the directions to create your " username and password.     Your access code is: [email or web address]  Expires: 2017 10:37 AM     Your access code will  in 90 days. If you need help or a new code, please call your [place] clinic or [phone].        Care EveryWhere ID     This is your Care EveryWhere ID. This could be used by other organizations to access your [place] medical records  [id number]        Equal Access to Services     [facility] : Hadii ana laura ku hadasho Soomaali, waaxda luqadaha, qaybta kaalmada adeegyada, waxay idiin hayaan adeeg huysaryrosenda burleson . So [facility] [phone].    ATENCIÓN: Si habla español, tiene a browne disposición servicios gratuitos de asistencia lingüística. Cherylame al [phone].    We comply with applicable federal civil rights laws and [place] laws. We do not discriminate on the basis of race, color, national origin, age, disability sex, sexual orientation or gender identity.               Review of your medicines      UNREVIEWED medicines. Ask your doctor about these medicines        Dose / Directions    PRENATAL VITAMINS PO        Dose:  1 tablet   Take 1 tablet by mouth daily   Refills:  0       ranitidine 150 MG tablet   Commonly known as:  ZANTAC   Used for:  Gastroesophageal reflux disease, esophagitis presence not specified        Dose:  150 mg   Take 1 tablet (150 mg) by mouth 2 times daily   Quantity:  60 tablet   Refills:  2       VITAMIN D PO        Dose:  500 Units   Take 500 Units by mouth 2 times daily   Refills:  0                Protect others around you: Learn how to safely use, store and throw away your medicines at www.disposemymeds.org.             Medication List: This is a list of all your medications and when to take them. Check marks below indicate your daily home schedule. Keep this list as a reference.      Medications           Morning Afternoon Evening Bedtime As Needed    PRENATAL VITAMINS PO   Take 1 tablet by mouth daily                                ranitidine 150 MG tablet    Commonly known as:  ZANTAC   Take 1 tablet (150 mg) by mouth 2 times daily                                VITAMIN D PO   Take 500 Units by mouth 2 times daily

## 2017-08-23 NOTE — DISCHARGE INSTRUCTIONS
OB Triage Discharge Instructions    Diet:  Regular diet as tolerated    Activity:  As tolerated    Other Special Instructions: Follow up with  at next appointment    Reason for being seen in L&D: Headache/backache, r/o preeclampsia    Treatment/procedures performed in L&D: U/A, CBC, Metabolic screen, Amnisure, Wet prep    [facility] [phone] if you have:  ? 5 or more contractions in one hour  ? Leaking of fluid from your vaginal area  ? Decreased fetal movement (follow kick count instructions)  ? Bleeding from your vaginal area  ? Swelling in your face, or increased swelling in your hands or legs  ? Headaches or vision problems such as blurring or seeing spots or starts  ? Nausea or vomiting lasting for more than 24 hours  ? An increase in weight (5lbs/week)  ? Epigastric pain (sometimes confused with heartburn that does not go away or a very bad stomach ache)    If you have any questions, please follow up with your doctor.        Patient Signature: ______________________________________________________________  By signing the above I acknowledge that a nurse or my care provider has explained the instruction to me and I have had any questions regarding my care explained to me.        Discharge Nurse Signature: _______________________________ 8/23/2017  5:13 PM    Method of discharge: ambulate    Accompanied by: spouse  Time of discharge: 1720      Pt was seen in [facility] today 08/23/17. Pt should not work today.

## 2017-08-25 LAB
BACTERIA SPEC CULT: NORMAL
SPECIMEN SOURCE: NORMAL

## 2017-09-01 ENCOUNTER — PRENATAL OFFICE VISIT (OUTPATIENT)
Dept: FAMILY MEDICINE | Facility: OTHER | Age: 27
End: 2017-09-01
Payer: COMMERCIAL

## 2017-09-01 VITALS
TEMPERATURE: 97.6 F | RESPIRATION RATE: 16 BRPM | HEART RATE: 104 BPM | SYSTOLIC BLOOD PRESSURE: 104 MMHG | WEIGHT: 258.1 LBS | BODY MASS INDEX: 40.51 KG/M2 | HEIGHT: 67 IN | DIASTOLIC BLOOD PRESSURE: 54 MMHG

## 2017-09-01 DIAGNOSIS — Z34.83 ENCOUNTER FOR SUPERVISION OF OTHER NORMAL PREGNANCY, THIRD TRIMESTER: Primary | ICD-10-CM

## 2017-09-01 PROCEDURE — 99207 ZZC COMPLICATED OB VISIT: CPT | Performed by: FAMILY MEDICINE

## 2017-09-01 ASSESSMENT — PAIN SCALES - GENERAL: PAINLEVEL: NO PAIN (0)

## 2017-09-01 NOTE — PATIENT INSTRUCTIONS
Thank you for visiting [facility]    Let me know if further symptoms.  We can repeat urine testing or wet prep.    If you want to pursue an elective , let me know, and we'll get you in with the surgeon.      Contact us or return if questions or concerns.     Please see me in 2 weeks for follow up.       If you had imaging scheduled please refer to your radiology prep sheet.    Appointment    Date_______________     Time_____________    Day:       With____________________________    Location_________________________    If you need medication refills, please contact your pharmacy 3 days before your prescriptions runs out. If you are out of refills, your pharmacy will contact contact the clinic.    Contact us or return if questions or concerns.     -Your Care Team:  MD Shi Frazier PA-C Folake Falaki, MD Anoshirvan Mazhari, MD Kelly White, CNP    General information about your clinic      Clinic hours:     Lab hours:  Phone [phone]  Monday 7:30 am-7 pm    Monday 8:30 am-6:30 pm   7:30 am-5 pm    8:30 am-4:30 pm    Pharmacy hours:  Phone [phone]  Monday 8:30 am-7pm   8:30am-6 pm                                       [facility] [phone]        We would like to hear from you, how was your visit today?    Daisy Vazquez  Patient Information Supervisor   Patient Care Supervisor  [facility] [place], and [facility], and [facility]  [phone]

## 2017-09-01 NOTE — MR AVS SNAPSHOT
After Visit Summary   2017    Valarie Tlaavera    MRN: [id number]           Patient Information     Date Of Birth          1990        Visit Information        Provider Department      2017 8:00 AM Luis Crisostomo MD [facility]        Care Instructions    Thank you for visiting [facility]    Let me know if further symptoms.  We can repeat urine testing or wet prep.    If you want to pursue an elective , let me know, and we'll get you in with the surgeon.      Contact us or return if questions or concerns.     Please see me in 2 weeks for follow up.       If you had imaging scheduled please refer to your radiology prep sheet.    Appointment    Date_______________     Time_____________    Day:       With____________________________    Location_________________________    If you need medication refills, please contact your pharmacy 3 days before your prescriptions runs out. If you are out of refills, your pharmacy will contact contact the clinic.    Contact us or return if questions or concerns.     -Your Care Team:  MD Shi Frazier PA-C Folake Falaki, MD Anoshirvan Mazhari, MD Kelly White, JACKY    General information about your clinic      Clinic hours:     Lab hours:  Phone [phone]  Monday 7:30 am-7 pm    Monday 8:30 am-6:30 pm   7:30 am-5 pm    8:30 am-4:30 pm    Pharmacy hours:  Phone [phone]  Monday 8:30 am-7pm   8:30am-6 pm                                       [employer] assistance [phone]        We would like to hear from you, how was your visit today?    Daisy Vazquez  Patient Information Supervisor   Patient Care Supervisor  [facility] [place], and [facility], and [facility]  [phone]             Follow-ups after your visit        Who to contact     If you have  "questions or need follow up information about today's clinic visit or your schedule please contact [facility] directly at [phone].  Normal or non-critical lab and imaging results will be communicated to you by MyChart, letter or phone within 4 business days after the clinic has received the results. If you do not hear from us within 7 days, please contact the clinic through [employer] or phone. If you have a critical or abnormal lab result, we will notify you by phone as soon as possible.  Submit refill requests through [employer] or call your pharmacy and they will forward the refill request to us. Please allow 3 business days for your refill to be completed.          Additional Information About Your Visit        [employer] Information     [employer] lets you send messages to your doctor, view your test results, renew your prescriptions, schedule appointments and more. To sign up, go to [email or web address] . Click on \"Log in\" on the left side of the screen, which will take you to the Welcome page. Then click on \"Sign up Now\" on the right side of the page.     You will be asked to enter the access code listed below, as well as some personal information. Please follow the directions to create your username and password.     Your access code is: [email or web address]  Expires: 2017 10:37 AM     Your access code will  in 90 days. If you need help or a new code, please call your [place] clinic or [phone].        Care EveryWhere ID     This is your Care EveryWhere ID. This could be used by other organizations to access your [place] medical records  [id number]        Your Vitals Were     Pulse Temperature Respirations Height Last Period BMI (Body Mass Index)    104 97.6  F (36.4  C) (Temporal) 16 5' 6.53\" (1.69 m) 2017 (Exact Date) 41 kg/m2       Blood Pressure from Last 3 Encounters:   17 104/54   17 120/61   17 100/56    Weight from Last 3 Encounters:   17 258 lb " 1.6 oz (117.1 kg)   08/16/17 254 lb (115.2 kg)   08/03/17 258 lb 6.4 oz (117.2 kg)              Today, you had the following     No orders found for display       Primary Care Provider Office Phone # Fax #    Luis Crisostomo [phone] [address] [place] [address]        Equal Access to Services     [facility]: Hadii aad ku hadasho Soomaali, waaxda luqadaha, qaybta kaalmada adeegyada, waxay idiin hayaan adeeg kharash la'aan ah. So [facility] [phone].    ATENCIÓN: Si habla espcolt, tiene a browne disposición servicios gratuitos de asistencia lingüística. Llame al [phone].    We comply with applicable federal civil rights laws and [place] laws. We do not discriminate on the basis of race, color, national origin, age, disability sex, sexual orientation or gender identity.            Thank you!     Thank you for choosing [facility]  for your care. Our goal is always to provide you with excellent care. Hearing back from our patients is one way we can continue to improve our services. Please take a few minutes to complete the written survey that you may receive in the mail after your visit with us. Thank you!             Your Updated Medication List - Protect others around you: Learn how to safely use, store and throw away your medicines at www.disposemymeds.org.          This list is accurate as of: 9/1/17  8:16 AM.  Always use your most recent med list.                   Brand Name Dispense Instructions for use Diagnosis    PRENATAL VITAMINS PO      Take 1 tablet by mouth daily        ranitidine 150 MG tablet    ZANTAC    60 tablet    Take 1 tablet (150 mg) by mouth 2 times daily    Gastroesophageal reflux disease, esophagitis presence not specified       VITAMIN D PO      Take 500 Units by mouth 2 times daily

## 2017-09-01 NOTE — NURSING NOTE
"Chief Complaint   Patient presents with     Prenatal Care       Initial /54 (BP Location: Left arm, Patient Position: Chair, Cuff Size: Adult Large)  Pulse 104  Temp 97.6  F (36.4  C) (Temporal)  Resp 16  Ht 5' 6.53\" (1.69 m)  Wt 258 lb 1.6 oz (117.1 kg)  LMP 01/11/2017 (Exact Date)  BMI 41 kg/m2 Estimated body mass index is 41 kg/(m^2) as calculated from the following:    Height as of this encounter: 5' 6.53\" (1.69 m).    Weight as of this encounter: 258 lb 1.6 oz (117.1 kg).  Medication Reconciliation: complete  Berenice Welch, BEAR    "

## 2017-09-15 ENCOUNTER — PRENATAL OFFICE VISIT (OUTPATIENT)
Dept: FAMILY MEDICINE | Facility: OTHER | Age: 27
End: 2017-09-15
Payer: COMMERCIAL

## 2017-09-15 VITALS
SYSTOLIC BLOOD PRESSURE: 112 MMHG | RESPIRATION RATE: 16 BRPM | TEMPERATURE: 97.7 F | HEIGHT: 67 IN | WEIGHT: 259.6 LBS | BODY MASS INDEX: 40.74 KG/M2 | HEART RATE: 104 BPM | DIASTOLIC BLOOD PRESSURE: 60 MMHG

## 2017-09-15 DIAGNOSIS — Z36.85 SCREENING, ANTENATAL, FOR STREPTOCOCCUS B: ICD-10-CM

## 2017-09-15 DIAGNOSIS — Z34.83 ENCOUNTER FOR SUPERVISION OF OTHER NORMAL PREGNANCY, THIRD TRIMESTER: Primary | ICD-10-CM

## 2017-09-15 PROCEDURE — 99207 ZZC PRENATAL VISIT: CPT | Performed by: FAMILY MEDICINE

## 2017-09-15 PROCEDURE — 87653 STREP B DNA AMP PROBE: CPT | Performed by: FAMILY MEDICINE

## 2017-09-15 ASSESSMENT — PAIN SCALES - GENERAL: PAINLEVEL: SEVERE PAIN (7)

## 2017-09-15 NOTE — NURSING NOTE
"Chief Complaint   Patient presents with     Prenatal Care     Panel Management       Initial /60 (BP Location: Left arm, Patient Position: Chair, Cuff Size: Adult Large)  Pulse 104  Temp 97.7  F (36.5  C) (Temporal)  Resp 16  Ht 5' 6.53\" (1.69 m)  Wt 259 lb 9.6 oz (117.8 kg)  LMP 01/11/2017 (Exact Date)  BMI 41.24 kg/m2 Estimated body mass index is 41.24 kg/(m^2) as calculated from the following:    Height as of this encounter: 5' 6.53\" (1.69 m).    Weight as of this encounter: 259 lb 9.6 oz (117.8 kg).  Medication Reconciliation: complete  Berenice Welch, CMA    "

## 2017-09-15 NOTE — LETTER
September 18, 2017      Valarie Talavera  [address] [facility] [address]        Dear ,    We are writing to inform you of your test results.    All of your labs were normal for you.    Resulted Orders   Strep, Group B by PCR   Result Value Ref Range    Group B Strep PCR Spec Yoshi Vaginal Rectal     Group B Strep PCR Negative NEG^Negative      Comment:      No GBS DNA detected, presumed negative for GBS or number of bacteria may be   below the limit of detection of the assay.  Assay performed on incubated broth culture of specimen using [employer] real-time   PCR.         If you have any questions or concerns, please call the clinic at the number listed above.       Sincerely,        Luis Crisostomo MD, MD

## 2017-09-15 NOTE — PROGRESS NOTES
More hip pain, having a harder time at work.  No LOF, no bleeding, no ctx.    Cervix is posterior, finger-tip dilated and soft.  Cephalic position confirmed by Leopold maneuvers and cervical exam.  GBS done today.  Prenatal flowsheet information is reviewed.  Discussed signs of labor and when to call or come in.  Reportable signs and symptoms discussed.  Repeat u/s for nuchal cord ordered.  F/u in 1-2 weeks.

## 2017-09-15 NOTE — PATIENT INSTRUCTIONS
Thank you for visiting [facility]    Let's get your repeat ultrasound to ensure all is well.    Contact us or return if questions or concerns.     We'll let you know your lab results as soon as we can.     Please see me in 1-2 weeks for follow up.       If you had imaging scheduled please refer to your radiology prep sheet.    Appointment    Date_______________     Time_____________    Day:   M TU W TH F    With____________________________    Location_________________________    If you need medication refills, please contact your pharmacy 3 days before your prescriptions runs out. If you are out of refills, your pharmacy will contact contact the clinic.    Contact us or return if questions or concerns.     -Your Care Team:  MD Shi Frazeir, RAHEEL Elder CNP    General information about your clinic      Clinic hours:     Lab hours:  Phone [phone]  Monday 7:30 am-7 pm    Monday 8:30 am-6:30 pm  Tuesday-Friday 7:30 am-5 pm   Tuesday-Friday 8:30 am-4:30 pm    Pharmacy hours:  Phone [phone]  Monday 8:30 am-7pm  Tuesday-Friday 8:30am-6 pm                                       [employer] assistance [phone]        We would like to hear from you, how was your visit today?    Daisy Vazquez  Patient Information Supervisor   Patient Care Supervisor  [facility], and [facility], and [facility]  [phone]

## 2017-09-15 NOTE — MR AVS SNAPSHOT
After Visit Summary   9/15/2017    Valarie Talavera    MRN: [id number]           Patient Information     Date Of Birth          1990        Visit Information        Provider Department      9/15/2017 8:00 AM Luis Crisostomo MD [facility]        Today's Diagnoses     Encounter for supervision of other normal pregnancy, third trimester    -  1    Nuchal cord, not applicable or unspecified fetus        Screening, , for Streptococcus B          Care Instructions    Thank you for visiting [facility]    Let's get your repeat ultrasound to ensure all is well.    Contact us or return if questions or concerns.     We'll let you know your lab results as soon as we can.     Please see me in 1-2 weeks for follow up.       If you had imaging scheduled please refer to your radiology prep sheet.    Appointment    Date_______________     Time_____________    Day:       With____________________________    Location_________________________    If you need medication refills, please contact your pharmacy 3 days before your prescriptions runs out. If you are out of refills, your pharmacy will contact contact the clinic.    Contact us or return if questions or concerns.     -Your Care Team:  MD Shi Frazier PA-C Kelly White, CNP    General information about your clinic      Clinic hours:     Lab hours:  Phone [phone]  Monday 7:30 am-7 pm    Monday 8:30 am-6:30 pm   7:30 am-5 pm    8:30 am-4:30 pm    Pharmacy hours:  Phone [phone]  Monday 8:30 am-7pm   8:30am-6 pm                                       Mychart assistance [phone]        We would like to hear from you, how was your visit today?    Daisy Vazquez  Patient Information Supervisor   Patient Care Supervisor  [facility], and [facility], and [facility]  [phone]             Follow-ups after your visit        Future tests that were ordered for you today     Open Future Orders        Priority Expected Expires Ordered    US OB Single Follow Up Repeat Routine  9/15/2018 9/15/2017            Who to contact     If you have questions or need follow up information about today's clinic visit or your schedule please contact [facility] directly at [phone].  Normal or non-critical lab and imaging results will be communicated to you by Next 2 Greatnesshart, letter or phone within 4 business days after the clinic has received the results. If you do not hear from us within 7 days, please contact the clinic through UpDownt or phone. If you have a critical or abnormal lab result, we will notify you by phone as soon as possible.  Submit refill requests through Personal Factory or call your pharmacy and they will forward the refill request to us. Please allow 3 business days for your refill to be completed.          Additional Information About Your Visit        Next 2 Greatnessharmakemoji Information     [employer] lets you send messages to your doctor, view your test results, renew your prescriptions, schedule appointments and more. To sign up, go to [email or web address] . Click on \"Log in\" on the left side of the screen, which will take you to the Welcome page. Then click on \"Sign up Now\" on the right side of the page.     You will be asked to enter the access code listed below, as well as some personal information. Please follow the directions to create your username and password.     Your access code is: [email or web address]  Expires: 2017 10:37 AM     Your access code will  in 90 days. If you need help or a new code, please call your [place] clinic or [phone].        Care EveryWhere ID     This is your Care EveryWhere ID. This could be used by other organizations to access your [place] medical records  [id number]        Your Vitals Were     Pulse Temperature " "Respirations Height Last Period BMI (Body Mass Index)    104 97.7  F (36.5  C) (Temporal) 16 5' 6.53\" (1.69 m) 01/11/2017 (Exact Date) 41.24 kg/m2       Blood Pressure from Last 3 Encounters:   09/15/17 112/60   09/01/17 104/54   08/23/17 120/61    Weight from Last 3 Encounters:   09/15/17 259 lb 9.6 oz (117.8 kg)   09/01/17 258 lb 1.6 oz (117.1 kg)   08/16/17 254 lb (115.2 kg)              We Performed the Following     Strep, Group B by PCR        Primary Care Provider Office Phone # Fax #    Luis Crisostomo [phone] [address] [place] [address]        Equal Access to Services     [facility]: Hadii aad ku hadasho Sobryant, waaxda luqadaha, qaybta kaalmada adeshariyasang, grant burleson . So [facility] [phone].    ATENCIÓN: Si habla español, tiene a browne disposición servicios gratuitos de asistencia lingüística. [facility] [phone].    We comply with applicable federal civil rights laws and [place] laws. We do not discriminate on the basis of race, color, national origin, age, disability sex, sexual orientation or gender identity.            Thank you!     Thank you for choosing [facility]  for your care. Our goal is always to provide you with excellent care. Hearing back from our patients is one way we can continue to improve our services. Please take a few minutes to complete the written survey that you may receive in the mail after your visit with us. Thank you!             Your Updated Medication List - Protect others around you: Learn how to safely use, store and throw away your medicines at www.disposemymeds.org.          This list is accurate as of: 9/15/17  8:17 AM.  Always use your most recent med list.                   Brand Name Dispense Instructions for use Diagnosis    PRENATAL VITAMINS PO      Take 1 tablet by mouth daily        ranitidine 150 MG tablet    ZANTAC    60 tablet    Take 1 tablet (150 mg) by mouth 2 times " daily    Gastroesophageal reflux disease, esophagitis presence not specified       VITAMIN D PO      Take 500 Units by mouth 2 times daily

## 2017-09-16 LAB
GP B STREP DNA SPEC QL NAA+PROBE: NEGATIVE
SPECIMEN SOURCE: NORMAL

## 2017-09-19 ENCOUNTER — RADIANT APPOINTMENT (OUTPATIENT)
Dept: ULTRASOUND IMAGING | Facility: OTHER | Age: 27
End: 2017-09-19
Attending: FAMILY MEDICINE
Payer: COMMERCIAL

## 2017-09-19 DIAGNOSIS — Z34.83 ENCOUNTER FOR SUPERVISION OF OTHER NORMAL PREGNANCY, THIRD TRIMESTER: ICD-10-CM

## 2017-09-19 PROCEDURE — 76816 OB US FOLLOW-UP PER FETUS: CPT

## 2017-09-20 ENCOUNTER — PRENATAL OFFICE VISIT (OUTPATIENT)
Dept: FAMILY MEDICINE | Facility: OTHER | Age: 27
End: 2017-09-20
Payer: COMMERCIAL

## 2017-09-20 VITALS
HEIGHT: 67 IN | RESPIRATION RATE: 16 BRPM | DIASTOLIC BLOOD PRESSURE: 60 MMHG | TEMPERATURE: 97 F | BODY MASS INDEX: 40.92 KG/M2 | WEIGHT: 260.7 LBS | HEART RATE: 96 BPM | SYSTOLIC BLOOD PRESSURE: 106 MMHG

## 2017-09-20 DIAGNOSIS — Z23 NEED FOR PROPHYLACTIC VACCINATION AND INOCULATION AGAINST INFLUENZA: Primary | ICD-10-CM

## 2017-09-20 DIAGNOSIS — Z34.82 ENCOUNTER FOR SUPERVISION OF OTHER NORMAL PREGNANCY IN SECOND TRIMESTER: ICD-10-CM

## 2017-09-20 PROCEDURE — 90471 IMMUNIZATION ADMIN: CPT | Performed by: FAMILY MEDICINE

## 2017-09-20 PROCEDURE — 99207 ZZC PRENATAL VISIT: CPT | Performed by: FAMILY MEDICINE

## 2017-09-20 PROCEDURE — 90686 IIV4 VACC NO PRSV 0.5 ML IM: CPT | Performed by: FAMILY MEDICINE

## 2017-09-20 ASSESSMENT — ANXIETY QUESTIONNAIRES
4. TROUBLE RELAXING: MORE THAN HALF THE DAYS
1. FEELING NERVOUS, ANXIOUS, OR ON EDGE: NOT AT ALL
2. NOT BEING ABLE TO STOP OR CONTROL WORRYING: NOT AT ALL
5. BEING SO RESTLESS THAT IT IS HARD TO SIT STILL: NOT AT ALL
GAD7 TOTAL SCORE: 3
GAD7 TOTAL SCORE: 3
7. FEELING AFRAID AS IF SOMETHING AWFUL MIGHT HAPPEN: NOT AT ALL
6. BECOMING EASILY ANNOYED OR IRRITABLE: SEVERAL DAYS
3. WORRYING TOO MUCH ABOUT DIFFERENT THINGS: NOT AT ALL
GAD7 TOTAL SCORE: 3
7. FEELING AFRAID AS IF SOMETHING AWFUL MIGHT HAPPEN: NOT AT ALL

## 2017-09-20 ASSESSMENT — PATIENT HEALTH QUESTIONNAIRE - PHQ9
SUM OF ALL RESPONSES TO PHQ QUESTIONS 1-9: 6
SUM OF ALL RESPONSES TO PHQ QUESTIONS 1-9: 6
10. IF YOU CHECKED OFF ANY PROBLEMS, HOW DIFFICULT HAVE THESE PROBLEMS MADE IT FOR YOU TO DO YOUR WORK, TAKE CARE OF THINGS AT HOME, OR GET ALONG WITH OTHER PEOPLE: NOT DIFFICULT AT ALL

## 2017-09-20 ASSESSMENT — PAIN SCALES - GENERAL: PAINLEVEL: SEVERE PAIN (6)

## 2017-09-20 NOTE — MR AVS SNAPSHOT
After Visit Summary   9/20/2017    Valarie Talavera    MRN: [id number]           Patient Information     Date Of Birth          1990        Visit Information        Provider Department      9/20/2017 1:45 PM Luis Crisostomo MD [facility]        Care Instructions    Thank you for visiting [facility]    Keep up the good work!    Contact us or return if questions or concerns.     Please see me in 1 week for follow up.         If you had imaging scheduled please refer to your radiology prep sheet.    Appointment    Date_______________     Time_____________    Day:   M TU W TH F    With____________________________    Location_________________________    If you need medication refills, please contact your pharmacy 3 days before your prescriptions runs out. If you are out of refills, your pharmacy will contact contact the clinic.    Contact us or return if questions or concerns.     -Your Care Team:  MD Shi Frazier PA-C Kelly White, CNP    General information about your clinic      Clinic hours:     Lab hours:  Phone [phone]  Monday 7:30 am-7 pm    Monday 8:30 am-6:30 pm  Tuesday-Friday 7:30 am-5 pm   Tuesday-Friday 8:30 am-4:30 pm    Pharmacy hours:  Phone [phone]  Monday 8:30 am-7pm  Tuesday-Friday 8:30am-6 pm                                       Mychart assistance [phone]        We would like to hear from you, how was your visit today?    Daisy Vazquez  Patient Information Supervisor   Patient Care Supervisor  [facility], and [facility] [place], and [facility]  [phone]             Follow-ups after your visit        Who to contact     If you have questions or need follow up information about today's clinic visit or your schedule please contact [facility] directly at [phone].  Normal or non-critical lab and imaging  "results will be communicated to you by MyChart, letter or phone within 4 business days after the clinic has received the results. If you do not hear from us within 7 days, please contact the clinic through "[employer] or phone. If you have a critical or abnormal lab result, we will notify you by phone as soon as possible.  Submit refill requests through "[employer] or call your pharmacy and they will forward the refill request to us. Please allow 3 business days for your refill to be completed.          Additional Information About Your Visit        "[employer] Information     "[employer] lets you send messages to your doctor, view your test results, renew your prescriptions, schedule appointments and more. To sign up, go to [email or web address] . Click on \"Log in\" on the left side of the screen, which will take you to the Welcome page. Then click on \"Sign up Now\" on the right side of the page.     You will be asked to enter the access code listed below, as well as some personal information. Please follow the directions to create your username and password.     Your access code is: [email or web address]  Expires: 2017 10:37 AM     Your access code will  in 90 days. If you need help or a new code, please call your [place] clinic or [phone].        Care EveryWhere ID     This is your Care EveryWhere ID. This could be used by other organizations to access your [place] medical records  [id number]        Your Vitals Were     Pulse Temperature Respirations Height Last Period BMI (Body Mass Index)    96 97  F (36.1  C) (Temporal) 16 5' 6.53\" (1.69 m) 2017 (Exact Date) 41.41 kg/m2       Blood Pressure from Last 3 Encounters:   17 106/60   09/15/17 112/60   17 104/54    Weight from Last 3 Encounters:   17 260 lb 11.2 oz (118.3 kg)   09/15/17 259 lb 9.6 oz (117.8 kg)   17 258 lb 1.6 oz (117.1 kg)              Today, you had the following     No orders found for display       Primary Care Provider " Office Phone # Fax #    Luis Fazal Crisostomo [phone] [address] [place] [address]        Equal Access to Services     [facility] CASTELLAN[facility] : Hadisamar smith camrono Sofabianaali, waaxda luqadaha, qaybta kaalmada janae, grant gonzálezin hayaamarlene fortuneshari gandhi lamariomarlene fine. So [facility] [phone].    ATENCIÓN: Si habla español, tiene a browne disposición servicios gratuitos de asistencia lingüística. Llame al [phone].    We comply with applicable federal civil rights laws and [place] laws. We do not discriminate on the basis of race, color, national origin, age, disability sex, sexual orientation or gender identity.            Thank you!     Thank you for choosing [facility]  for your care. Our goal is always to provide you with excellent care. Hearing back from our patients is one way we can continue to improve our services. Please take a few minutes to complete the written survey that you may receive in the mail after your visit with us. Thank you!             Your Updated Medication List - Protect others around you: Learn how to safely use, store and throw away your medicines at www.disposemymeds.org.          This list is accurate as of: 9/20/17  2:24 PM.  Always use your most recent med list.                   Brand Name Dispense Instructions for use Diagnosis    PRENATAL VITAMINS PO      Take 1 tablet by mouth daily        ranitidine 150 MG tablet    ZANTAC    60 tablet    Take 1 tablet (150 mg) by mouth 2 times daily    Gastroesophageal reflux disease, esophagitis presence not specified       VITAMIN D PO      Take 500 Units by mouth 2 times daily

## 2017-09-20 NOTE — NURSING NOTE
"Chief Complaint   Patient presents with     Prenatal Care     Panel Management     phq, connie       Initial /60 (BP Location: Left arm, Patient Position: Chair, Cuff Size: Adult Large)  Pulse 96  Temp 97  F (36.1  C) (Temporal)  Resp 16  Ht 5' 6.53\" (1.69 m)  Wt 260 lb 11.2 oz (118.3 kg)  LMP 01/11/2017 (Exact Date)  BMI 41.41 kg/m2 Estimated body mass index is 41.41 kg/(m^2) as calculated from the following:    Height as of this encounter: 5' 6.53\" (1.69 m).    Weight as of this encounter: 260 lb 11.2 oz (118.3 kg).  Medication Reconciliation: complete  Berenice Welch, BEAR    "

## 2017-09-20 NOTE — PROGRESS NOTES
Injectable Influenza Immunization Documentation    1.  Is the person to be vaccinated sick today?   No    2. Does the person to be vaccinated have an allergy to a component   of the vaccine?   No    3. Has the person to be vaccinated ever had a serious reaction   to influenza vaccine in the past?   No    4. Has the person to be vaccinated ever had Guillain-Barré syndrome?   No    Form completed by Berenice Welch, CMA  Per orders of Dr. Crisostomo, injection of Flu shot given by Berenice Welch. Patient instructed to remain in clinic for 15 minutes afterwards, and to report any adverse reaction to me immediately.

## 2017-09-20 NOTE — LETTER
[facility]  [address] [facility] [address]  Phone: [phone]    September 20, 2017        Valarie Talavera  [address] [facility] [address]          To whom it may concern:    RE: Valarie Talavera    Patient was seen and treated today at our clinic.    She is having increasing difficulty with work due to her pregnancy.  Please start her maternity leave on 10/1/2017 so that she can care for her pregnancy needs.    Please contact me for questions or concerns.      Sincerely,        Luis Crisostomo MD

## 2017-09-20 NOTE — PATIENT INSTRUCTIONS
Thank you for visiting [facility]    Keep up the good work!    Contact us or return if questions or concerns.     Please see me in 1 week for follow up.         If you had imaging scheduled please refer to your radiology prep sheet.    Appointment    Date_______________     Time_____________    Day:   M TU W TH F    With____________________________    Location_________________________    If you need medication refills, please contact your pharmacy 3 days before your prescriptions runs out. If you are out of refills, your pharmacy will contact contact the clinic.    Contact us or return if questions or concerns.     -Your Care Team:  MD Shi Frazier, RAHEEL Elder, JACKY    General information about your clinic      Clinic hours:     Lab hours:  Phone [phone]  Monday 7:30 am-7 pm    Monday 8:30 am-6:30 pm  Tuesday-Friday 7:30 am-5 pm   Tuesday-Friday 8:30 am-4:30 pm    Pharmacy hours:  Phone [phone]  Monday 8:30 am-7pm  Tuesday-Friday 8:30am-6 pm                                       Mychart assistance [phone]        We would like to hear from you, how was your visit today?    Daiys Vazquez  Patient Information Supervisor   Patient Care Supervisor  [facility] [place], and [facility], and [facility]  [phone]

## 2017-09-20 NOTE — PROGRESS NOTES
A few contractions.  Had to sit down at work for a bit.  Having heartburn, hasn't picked up H2 blocker yet.  Encouraged her to do so.  No bleeding, no LOF.  Discharge.  Doing well.  No concerns today.  Cervix check next visit.  Flu shot today.  Cephalic position confirmed by Leopold maneuvers.  Prenatal flowsheet information is reviewed.  Reportable signs and symptoms discussed.  F/u in 1 week.

## 2017-09-21 ASSESSMENT — ANXIETY QUESTIONNAIRES: GAD7 TOTAL SCORE: 3

## 2017-09-21 ASSESSMENT — PATIENT HEALTH QUESTIONNAIRE - PHQ9: SUM OF ALL RESPONSES TO PHQ QUESTIONS 1-9: 6

## 2017-09-25 ENCOUNTER — PRENATAL OFFICE VISIT (OUTPATIENT)
Dept: FAMILY MEDICINE | Facility: OTHER | Age: 27
End: 2017-09-25
Payer: COMMERCIAL

## 2017-09-25 VITALS
HEART RATE: 92 BPM | BODY MASS INDEX: 41.28 KG/M2 | DIASTOLIC BLOOD PRESSURE: 62 MMHG | WEIGHT: 263 LBS | SYSTOLIC BLOOD PRESSURE: 104 MMHG | HEIGHT: 67 IN | TEMPERATURE: 97.7 F | RESPIRATION RATE: 16 BRPM

## 2017-09-25 DIAGNOSIS — Z34.83 ENCOUNTER FOR SUPERVISION OF OTHER NORMAL PREGNANCY IN THIRD TRIMESTER: Primary | ICD-10-CM

## 2017-09-25 PROCEDURE — 99207 ZZC PRENATAL VISIT: CPT | Performed by: FAMILY MEDICINE

## 2017-09-25 ASSESSMENT — PAIN SCALES - GENERAL: PAINLEVEL: EXTREME PAIN (8)

## 2017-09-25 NOTE — MR AVS SNAPSHOT
After Visit Summary   9/25/2017    Valarie Talavera    MRN: [id number]           Patient Information     Date Of Birth          1990        Visit Information        Provider Department      9/25/2017 6:30 PM Luis Crisostomo MD [facility]        Care Instructions    Thank you for visiting [facility]    Keep up the good work!    Contact us or return if questions or concerns.     Please see me in 1 week for follow up.       If you had imaging scheduled please refer to your radiology prep sheet.    Appointment    Date_______________     Time_____________    Day:   M TU W TH F    With____________________________    Location_________________________    If you need medication refills, please contact your pharmacy 3 days before your prescriptions runs out. If you are out of refills, your pharmacy will contact contact the clinic.    Contact us or return if questions or concerns.     -Your Care Team:  MD Shi Frazier PA-C Kelly White, CNP    General information about your clinic      Clinic hours:     Lab hours:  Phone [phone]  Monday 7:30 am-7 pm    Monday 8:30 am-6:30 pm  Tuesday-Friday 7:30 am-5 pm   Tuesday-Friday 8:30 am-4:30 pm    Pharmacy hours:  Phone [phone]  Monday 8:30 am-7pm  Tuesday-Friday 8:30am-6 pm                                       Mychart assistance [phone]        We would like to hear from you, how was your visit today?    Daisy Vazquez  Patient Information Supervisor   Patient Care Supervisor  [facility], and [facility] [place], and [facility]  [phone]             Follow-ups after your visit        Who to contact     If you have questions or need follow up information about today's clinic visit or your schedule please contact [facility] directly at [phone].  Normal or non-critical lab and imaging results  "will be communicated to you by MyChart, letter or phone within 4 business days after the clinic has received the results. If you do not hear from us within 7 days, please contact the clinic through skyrockit or phone. If you have a critical or abnormal lab result, we will notify you by phone as soon as possible.  Submit refill requests through skyrockit or call your pharmacy and they will forward the refill request to us. Please allow 3 business days for your refill to be completed.          Additional Information About Your Visit        skyrockit Information     skyrockit lets you send messages to your doctor, view your test results, renew your prescriptions, schedule appointments and more. To sign up, go to [email or web address] . Click on \"Log in\" on the left side of the screen, which will take you to the Welcome page. Then click on \"Sign up Now\" on the right side of the page.     You will be asked to enter the access code listed below, as well as some personal information. Please follow the directions to create your username and password.     Your access code is: [email or web address]  Expires: 2017 10:37 AM     Your access code will  in 90 days. If you need help or a new code, please call your [place] clinic or [phone].        Care EveryWhere ID     This is your Care EveryWhere ID. This could be used by other organizations to access your [place] medical records  [id number]        Your Vitals Were     Pulse Temperature Respirations Height Last Period BMI (Body Mass Index)    92 97.7  F (36.5  C) (Temporal) 16 5' 6.53\" (1.69 m) 2017 (Exact Date) 41.78 kg/m2       Blood Pressure from Last 3 Encounters:   17 104/62   17 106/60   09/15/17 112/60    Weight from Last 3 Encounters:   17 263 lb (119.3 kg)   17 260 lb 11.2 oz (118.3 kg)   09/15/17 259 lb 9.6 oz (117.8 kg)              Today, you had the following     No orders found for display       Primary Care Provider Office Phone " # Fax #    Luis Fazal Crisostomo [phone]       [address] [place] [address]        Equal Access to Services     JOVITA CASTELLANOS : Hadii ana laura smith susan Syed, wajeffda luqjaden, qaybta kaalmada janae, grant gandhi lamariomarlene babatunde. So [facility] [phone].    ATENCIÓN: Si habla español, tiene a browne disposición servicios gratuitos de asistencia lingüística. Llame al [phone].    We comply with applicable federal civil rights laws and [place] laws. We do not discriminate on the basis of race, color, national origin, age, disability sex, sexual orientation or gender identity.            Thank you!     Thank you for choosing [facility]  for your care. Our goal is always to provide you with excellent care. Hearing back from our patients is one way we can continue to improve our services. Please take a few minutes to complete the written survey that you may receive in the mail after your visit with us. Thank you!             Your Updated Medication List - Protect others around you: Learn how to safely use, store and throw away your medicines at www.disposemymeds.org.          This list is accurate as of: 9/25/17  6:51 PM.  Always use your most recent med list.                   Brand Name Dispense Instructions for use Diagnosis    PRENATAL VITAMINS PO      Take 1 tablet by mouth daily        ranitidine 150 MG tablet    ZANTAC    60 tablet    Take 1 tablet (150 mg) by mouth 2 times daily    Gastroesophageal reflux disease, esophagitis presence not specified       VITAMIN D PO      Take 500 Units by mouth 2 times daily

## 2017-09-25 NOTE — NURSING NOTE
"Chief Complaint   Patient presents with     Prenatal Care     Panel Management       Initial /62 (BP Location: Right arm, Patient Position: Chair, Cuff Size: Adult Large)  Pulse 92  Temp 97.7  F (36.5  C) (Temporal)  Resp 16  Ht 5' 6.53\" (1.69 m)  Wt 263 lb (119.3 kg)  LMP 01/11/2017 (Exact Date)  BMI 41.78 kg/m2 Estimated body mass index is 41.78 kg/(m^2) as calculated from the following:    Height as of this encounter: 5' 6.53\" (1.69 m).    Weight as of this encounter: 263 lb (119.3 kg).  Medication Reconciliation: complete  Berenice Welch, BEAR    "

## 2017-09-25 NOTE — PROGRESS NOTES
Some contractions, some are painful.  Worse when lying down.  No bleeding, no LOF.    Cervix is as noted.  Cephalic position confirmed by Leopold maneuvers and cervical exam.  Prenatal flowsheet information is reviewed.  Discussed signs of labor and when to call or come in.  Reportable signs and symptoms discussed.  F/u in 1 week.

## 2017-09-25 NOTE — PATIENT INSTRUCTIONS
Thank you for visiting [facility]    Keep up the good work!    Contact us or return if questions or concerns.     Please see me in 1 week for follow up.       If you had imaging scheduled please refer to your radiology prep sheet.    Appointment    Date_______________     Time_____________    Day:   M TU W TH F    With____________________________    Location_________________________    If you need medication refills, please contact your pharmacy 3 days before your prescriptions runs out. If you are out of refills, your pharmacy will contact contact the clinic.    Contact us or return if questions or concerns.     -Your Care Team:  MD Shi Frazier, RAHEEL Elder, JACKY    General information about your clinic      Clinic hours:     Lab hours:  Phone [phone]  Monday 7:30 am-7 pm    Monday 8:30 am-6:30 pm  Tuesday-Friday 7:30 am-5 pm   Tuesday-Friday 8:30 am-4:30 pm    Pharmacy hours:  Phone [phone]  Monday 8:30 am-7pm  Tuesday-Friday 8:30am-6 pm                                       Mychart assistance [phone]        We would like to hear from you, how was your visit today?    Daisy Vazquez  Patient Information Supervisor   Patient Care Supervisor  [facility] [place], and [facility], and [facility]  [phone]

## 2017-09-26 PROBLEM — Z34.83 ENCOUNTER FOR SUPERVISION OF OTHER NORMAL PREGNANCY IN THIRD TRIMESTER: Status: ACTIVE | Noted: 2017-03-11

## 2017-09-29 ENCOUNTER — HOSPITAL ENCOUNTER (OUTPATIENT)
Facility: CLINIC | Age: 27
Discharge: HOME | End: 2017-09-29
Attending: FAMILY MEDICINE | Admitting: FAMILY MEDICINE
Payer: COMMERCIAL

## 2017-09-29 ENCOUNTER — TELEPHONE (OUTPATIENT)
Dept: FAMILY MEDICINE | Facility: OTHER | Age: 27
End: 2017-09-29

## 2017-09-29 VITALS
TEMPERATURE: 98.5 F | SYSTOLIC BLOOD PRESSURE: 123 MMHG | HEART RATE: 113 BPM | RESPIRATION RATE: 16 BRPM | DIASTOLIC BLOOD PRESSURE: 64 MMHG

## 2017-09-29 DIAGNOSIS — Z34.83 ENCOUNTER FOR SUPERVISION OF OTHER NORMAL PREGNANCY IN THIRD TRIMESTER: Primary | ICD-10-CM

## 2017-09-29 PROCEDURE — 99214 OFFICE O/P EST MOD 30 MIN: CPT | Mod: 25

## 2017-09-29 PROCEDURE — 59025 FETAL NON-STRESS TEST: CPT | Mod: 26 | Performed by: FAMILY MEDICINE

## 2017-09-29 PROCEDURE — 59025 FETAL NON-STRESS TEST: CPT

## 2017-09-29 PROCEDURE — 59025 FETAL NON-STRESS TEST: CPT | Mod: 59

## 2017-09-29 PROCEDURE — 99213 OFFICE O/P EST LOW 20 MIN: CPT | Mod: 25

## 2017-09-29 NOTE — IP AVS SNAPSHOT
[facility]    [address]     [place] [address]    Phone:  [phone]                                       After Visit Summary   9/29/2017    Valarie Talavera    MRN: [id number]           After Visit Summary Signature Page     I have received my discharge instructions, and my questions have been answered. I have discussed any challenges I see with this plan with the nurse or doctor.    ..........................................................................................................................................  Patient/Patient Representative Signature      ..........................................................................................................................................  Patient Representative Print Name and Relationship to Patient    ..................................................               ................................................  Date                                            Time    ..........................................................................................................................................  Reviewed by Signature/Title    ...................................................              ..............................................  Date                                                            Time

## 2017-09-29 NOTE — TELEPHONE ENCOUNTER
Valarie Talavera is a 26 year old female who calls with pregnant and having decreased fetal movement .    NURSING ASSESSMENT:  Description:  Having some abdominal pain for the last few days. Last night had a large emesis episode with blood in it. Baby does have the cord wrapped around his neck 2 times. Baby has had decreased movement within the last 2 hours. And at this time the patient stated she has no pain, the pain has felted away when she noticed decreased fetal movement. Patient stated her concerns and agreed it would be reassuring to be evaluated.   Onset/duration:  Last few days   Pain scale (0-10)   0/10  LMP/preg/breast feeding:  Patient's last menstrual period was 01/11/2017 (exact date).  GA: 37w2d  JACINDA: Estimated Date of Delivery: Oct 18, 2017  Last exam/Treatment:  09/25/2017  Allergies:   Allergies   Allergen Reactions     Zithromax [Azithromycin] Other (See Comments)     hives     NURSING PLAN: Nursing advice to patient to be evaluated in L&D [facility]    RECOMMENDED DISPOSITION:  >20 weeks gestation: go to Birthing Center  Will comply with recommendation: Yes  If further questions/concerns or if symptoms do not improve, worsen or new symptoms develop, call your PCP or [facility] as soon as possible.    NOTES:  Disposition was determined by the first positive assessment question, therefore all previous assessment questions were negative    Guideline used:  Telephone Triage for Obstetrics and Gynecology, Meghna Quiroz and Pirsca Red  3rd Trimester abdominal pain  3rd trimester decreased fetal movement   3rd trimester recognizing labor  Nursing Judgment  Gave Report to [facility] L&D Nurse: Amy   Informed PCP via electronic communication    Jazmin Cosme, RN, BSN

## 2017-09-29 NOTE — IP AVS SNAPSHOT
MRN:[id number]                      After Visit Summary   9/29/2017    Valarie Talavera    MRN: [id number]           Thank you!     Thank you for choosing [facility] for your care. Our goal is always to provide you with excellent care. Hearing back from our patients is one way we can continue to improve our services. Please take a few minutes to complete the written survey that you may receive in the mail after you visit with us. Thank you!        Patient Information     Date Of Birth          1990        About your hospital stay     You were admitted on:  September 29, 2017 You last received care in the:  [facility]    You were discharged on:  September 29, 2017       Who to Call     For medical emergencies, please call 911.  For non-urgent questions about your medical care, please call your primary care provider or clinic, [phone]          Attending Provider     Provider Specialty    Luis Crisostomo MD Family Practice       Primary Care Provider Office Phone # Fax #    Luis Crisostomo [phone]      Your next 10 appointments already scheduled     Oct 04, 2017  3:45 PM CDT   ESTABLISHED PRENATAL with Luis Crisostomo MD   [facility]    [address] [address] [phone]              Further instructions from your care team                OB Triage Discharge Instructions    Diet:  Regular diet as tolerated  Drink 8-10 glasses of water and / or juice every day    Activity:  As tolerated    Other Special Instructions: follow as previously scheduled or sooner with any concerns    Reason for being seen in L&D: decreased fetal movement    Treatment/procedures performed in L&D: non stress test    [facility] [phone] if you have:  ? 5 or more contractions in one hour  ? Leaking of fluid from your vaginal area  ? Decreased fetal movement (follow kick count  "instructions)  ? Bleeding from your vaginal area  ? Swelling in your face, or increased swelling in your hands or legs  ? Headaches or vision problems such as blurring or seeing spots or starts  ? Nausea or vomiting lasting for more than 24 hours  ? An increase in weight (5lbs/week)  ? Epigastric pain (sometimes confused with heartburn that does not go away or a very bad stomach ache)    If you have any questions, please follow up with your doctor.        Patient Signature: ______________________________________________________________  By signing the above I acknowledge that a nurse or my care provider has explained the instruction to me and I have had any questions regarding my care explained to me.        Discharge Nurse Signature: _______________________________ 2017  1:39 PM    Method of discharge: amb.    Accompanied by: self  Time of discharge: 1345        Pending Results     No orders found from 2017 to 2017.            Admission Information     Date & Time Provider Department Dept. Phone    2017 [facility] [phone]      Your Vitals Were     Blood Pressure Pulse Temperature Respirations Last Period       123/64 113 98.5  F (36.9  C) (Oral) 16 2017 (Exact Date)       MyChart Information     Sign[facility]at lets you send messages to your doctor, view your test results, renew your prescriptions, schedule appointments and more. To sign up, go to [email or web address] . Click on \"Log in\" on the left side of the screen, which will take you to the Welcome page. Then click on \"Sign up Now\" on the right side of the page.     You will be asked to enter the access code listed below, as well as some personal information. Please follow the directions to create your username and password.     Your access code is: [email or web address]  Expires: 2017 10:37 AM     Your access code will  in 90 days. If you need help or a new code, please call your " [facility] or [phone].        Care EveryWhere ID     This is your Care EveryWhere ID. This could be used by other organizations to access your [facility] medical records  [id number]        Equal Access to Services     JOVITA CASTELLANOS : Hadii aad ku hadmoiseso Sofabianaali, waaxda luqadaha, qaybta kaalmada adeegyada, grant mtz tongshari gandhi benjy fine. So [facility] [phone].    ATENCIÓN: Si habla español, tiene a browne disposición servicios gratuitos de asistencia lingüística. Llame al [phone].    We comply with applicable federal civil rights laws and [place] laws. We do not discriminate on the basis of race, color, national origin, age, disability, sex, sexual orientation, or gender identity.               Review of your medicines      UNREVIEWED medicines. Ask your doctor about these medicines        Dose / Directions    PRENATAL VITAMINS PO        Dose:  1 tablet   Take 1 tablet by mouth daily   Refills:  0       ranitidine 150 MG tablet   Commonly known as:  ZANTAC   Used for:  Gastroesophageal reflux disease, esophagitis presence not specified        Dose:  150 mg   Take 1 tablet (150 mg) by mouth 2 times daily   Quantity:  60 tablet   Refills:  2       VITAMIN D PO        Dose:  500 Units   Take 500 Units by mouth 2 times daily   Refills:  0         START taking        Dose / Directions    omeprazole 20 MG CR capsule   Commonly known as:  priLOSEC   Used for:  Encounter for supervision of other normal pregnancy in third trimester        Dose:  20 mg   Take 1 capsule (20 mg) by mouth daily   Quantity:  90 capsule   Refills:  1            Where to get your medicines      These medications were sent to [facility], [place] - [address]   [address] , [facility] [address]     Phone:  [phone]     omeprazole 20 MG CR capsule                Protect others around you: Learn how to safely use, store and throw away your medicines at www.disposemymeds.org.              Medication List: This is a list of all your medications and when to take them. Check marks below indicate your daily home schedule. Keep this list as a reference.      Medications           Morning Afternoon Evening Bedtime As Needed    omeprazole 20 MG CR capsule   Commonly known as:  priLOSEC   Take 1 capsule (20 mg) by mouth daily                                PRENATAL VITAMINS PO   Take 1 tablet by mouth daily                                ranitidine 150 MG tablet   Commonly known as:  ZANTAC   Take 1 tablet (150 mg) by mouth 2 times daily                                VITAMIN D PO   Take 500 Units by mouth 2 times daily

## 2017-09-29 NOTE — PROGRESS NOTES
Dr. Crisostomo was updated on pt's arrival & c/o decreased fetal movement for today, NST reactive with no contractions noted & had thrown up twice today with streaks of blood in the mucus.  MD gave order for d/c to home & ordered Prilosec may try.  Pt was instructed on plan of care, will prepare d/c paperwork.

## 2017-09-29 NOTE — PROGRESS NOTES
S: Triage Arrival  B: Valarie is a 26 y.o.  @ 37w 2d who presents with complaint of decreased fetal movement today  A: EFM applied. FHT's 130 with moderate variability, accels present, no decels noted on strip. Contractions occasional. Pt placed on monitor, performing NST  R: Will notify MD to obtain further orders after NST complete.

## 2017-09-29 NOTE — PROGRESS NOTES
S:  Discharge from triage.   A:  FHT's 130, Moderate variability, Accels present, no decels noted. Contractions not seen or felt.    R:  Written and verbal D/C instruction provided. Pt. Verbalized understanding of D/C instructions and will follow up with primary MD as previously scheduled.   Verbalizes understanding that she will call or return to the [facility] with any further questions or concerns.   Pt. D/C'd via amb with SO    Nursing Discharge Checklist  Discharge order entered: Yes  Patient care order entered: Yes  Charges entered: Yes  IV start and stop times have been documented in Epic? n/a  NST start and stop times have been documented in Epic Doc F/S? Yes

## 2017-09-29 NOTE — DISCHARGE INSTRUCTIONS
OB Triage Discharge Instructions    Diet:  Regular diet as tolerated  Drink 8-10 glasses of water and / or juice every day    Activity:  As tolerated    Other Special Instructions: follow as previously scheduled or sooner with any concerns    Reason for being seen in L&D: decreased fetal movement    Treatment/procedures performed in L&D: non stress test    Call the [facility]place at [phone] if you have:  ? 5 or more contractions in one hour  ? Leaking of fluid from your vaginal area  ? Decreased fetal movement (follow kick count instructions)  ? Bleeding from your vaginal area  ? Swelling in your face, or increased swelling in your hands or legs  ? Headaches or vision problems such as blurring or seeing spots or starts  ? Nausea or vomiting lasting for more than 24 hours  ? An increase in weight (5lbs/week)  ? Epigastric pain (sometimes confused with heartburn that does not go away or a very bad stomach ache)    If you have any questions, please follow up with your doctor.        Patient Signature: ______________________________________________________________  By signing the above I acknowledge that a nurse or my care provider has explained the instruction to me and I have had any questions regarding my care explained to me.        Discharge Nurse Signature: _______________________________ 9/29/2017  1:39 PM    Method of discharge: amb.    Accompanied by: self  Time of discharge: 2395

## 2017-10-04 ENCOUNTER — PRENATAL OFFICE VISIT (OUTPATIENT)
Dept: FAMILY MEDICINE | Facility: OTHER | Age: 27
End: 2017-10-04
Payer: COMMERCIAL

## 2017-10-04 VITALS
BODY MASS INDEX: 41.34 KG/M2 | DIASTOLIC BLOOD PRESSURE: 60 MMHG | HEIGHT: 67 IN | WEIGHT: 263.4 LBS | HEART RATE: 100 BPM | RESPIRATION RATE: 16 BRPM | TEMPERATURE: 97.6 F | SYSTOLIC BLOOD PRESSURE: 104 MMHG

## 2017-10-04 DIAGNOSIS — K21.00 GASTROESOPHAGEAL REFLUX DISEASE WITH ESOPHAGITIS: ICD-10-CM

## 2017-10-04 DIAGNOSIS — Z34.83 ENCOUNTER FOR SUPERVISION OF OTHER NORMAL PREGNANCY IN THIRD TRIMESTER: Primary | ICD-10-CM

## 2017-10-04 PROCEDURE — 99207 ZZC COMPLICATED OB VISIT: CPT | Performed by: FAMILY MEDICINE

## 2017-10-04 ASSESSMENT — PAIN SCALES - GENERAL: PAINLEVEL: SEVERE PAIN (7)

## 2017-10-04 NOTE — MR AVS SNAPSHOT
After Visit Summary   10/4/2017    Valarie Talavera    MRN: [id number]           Patient Information     Date Of Birth          1990        Visit Information        Provider Department      10/4/2017 3:45 PM Luis Crisostomo MD [facility]        Care Instructions    Thank you for visiting [facility]    Keep up the good work!    Contact us or return if questions or concerns.     Please see me in 1 week for follow up.       If you had imaging scheduled please refer to your radiology prep sheet.    Appointment    Date_______________     Time_____________    Day:   M TU W TH F    With____________________________    Location_________________________    If you need medication refills, please contact your pharmacy 3 days before your prescriptions runs out. If you are out of refills, your pharmacy will contact contact the clinic.    Contact us or return if questions or concerns.     -Your Care Team:  MD Shi Frazier PA-C Kelly White, CNP    General information about your clinic      Clinic hours:     Lab hours:  Phone [phone]  Monday 7:30 am-7 pm    Monday 8:30 am-6:30 pm  Tuesday-Friday 7:30 am-5 pm   Tuesday-Friday 8:30 am-4:30 pm    Pharmacy hours:  Phone [phone]  Monday 8:30 am-7pm  Tuesday-Friday 8:30am-6 pm                                       Mychart assistance [phone]        We would like to hear from you, how was your visit today?    Daisy Vazquez  Patient Information Supervisor   Patient Care Supervisor  [facility] [place], and [facility] [place], and [facility]  [phone]             Follow-ups after your visit        Who to contact     If you have questions or need follow up information about today's clinic visit or your schedule please contact [facility] directly at [phone].  Normal or non-critical lab and imaging results  "will be communicated to you by MyChart, letter or phone within 4 business days after the clinic has received the results. If you do not hear from us within 7 days, please contact the clinic through [facility] or phone. If you have a critical or abnormal lab result, we will notify you by phone as soon as possible.  Submit refill requests through [facility] or call your pharmacy and they will forward the refill request to us. Please allow 3 business days for your refill to be completed.          Additional Information About Your Visit        [facility] Information     [facility] lets you send messages to your doctor, view your test results, renew your prescriptions, schedule appointments and more. To sign up, go to [email or web address] . Click on \"Log in\" on the left side of the screen, which will take you to the Welcome page. Then click on \"Sign up Now\" on the right side of the page.     You will be asked to enter the access code listed below, as well as some personal information. Please follow the directions to create your username and password.     Your access code is: [email or web address]  Expires: 2017 10:37 AM     Your access code will  in 90 days. If you need help or a new code, please call your [facility] clinic or [phone].        Care EveryWhere ID     This is your Care EveryWhere ID. This could be used by other organizations to access your [facility] medical records  [id number]        Your Vitals Were     Pulse Temperature Respirations Height Last Period BMI (Body Mass Index)    100 97.6  F (36.4  C) (Temporal) 16 5' 6.53\" (1.69 m) 2017 (Exact Date) 41.84 kg/m2       Blood Pressure from Last 3 Encounters:   10/04/17 104/60   17 123/64   17 104/62    Weight from Last 3 Encounters:   10/04/17 263 lb 6.4 oz (119.5 kg)   17 263 lb (119.3 kg)   17 260 lb 11.2 oz (118.3 kg)              Today, you had the following     No orders found for display       Primary Care Provider Office Phone " # Fax #    Luis Fazal Crisostomo [phone]       [address] [place] [address]        Equal Access to Services     JOVITA CASTELLANOS : Hadii ana laura smith susan Sobryant, wajeffda luqadaha, qaybta kaalmada janae, grant gandhi lamariomarlene babatunde. So [facility] [phone].    ATENCIÓN: Si habla español, tiene a browne disposición servicios gratuitos de asistencia lingüística. Llame al [phone].    We comply with applicable federal civil rights laws and [place] laws. We do not discriminate on the basis of race, color, national origin, age, disability, sex, sexual orientation, or gender identity.            Thank you!     Thank you for choosing [facility]  for your care. Our goal is always to provide you with excellent care. Hearing back from our patients is one way we can continue to improve our services. Please take a few minutes to complete the written survey that you may receive in the mail after your visit with us. Thank you!             Your Updated Medication List - Protect others around you: Learn how to safely use, store and throw away your medicines at www.disposemymeds.org.          This list is accurate as of: 10/4/17  4:26 PM.  Always use your most recent med list.                   Brand Name Dispense Instructions for use Diagnosis    omeprazole 20 MG CR capsule    priLOSEC    90 capsule    Take 1 capsule (20 mg) by mouth daily    Encounter for supervision of other normal pregnancy in third trimester       PRENATAL VITAMINS PO      Take 1 tablet by mouth daily        ranitidine 150 MG tablet    ZANTAC    60 tablet    Take 1 tablet (150 mg) by mouth 2 times daily    Gastroesophageal reflux disease, esophagitis presence not specified       VITAMIN D PO      Take 500 Units by mouth 2 times daily

## 2017-10-04 NOTE — PROGRESS NOTES
Omeprazole has helped considerably with her epigastric symptoms, reflux.  Had some blood-tinged vomit, she states.  This has resolved.  Occasional contractions.  No bleeding or LOF.  Increased vaginal discharge.    Cervix is as noted.  Cephalic position confirmed by Leopold maneuvers and cervical exam.  Prenatal flowsheet information is reviewed.  Discussed signs of labor and when to call or come in.  Reportable signs and symptoms discussed.  F/u in 1 week.

## 2017-10-04 NOTE — PATIENT INSTRUCTIONS
Thank you for visiting [facility]    Keep up the good work!    Contact us or return if questions or concerns.     Please see me in 1 week for follow up.       If you had imaging scheduled please refer to your radiology prep sheet.    Appointment    Date_______________     Time_____________    Day:   M TU W TH F    With____________________________    Location_________________________    If you need medication refills, please contact your pharmacy 3 days before your prescriptions runs out. If you are out of refills, your pharmacy will contact contact the clinic.    Contact us or return if questions or concerns.     -Your Care Team:  MD Shi Frazier, RAHEEL Elder, JACKY    General information about your clinic      Clinic hours:     Lab hours:  Phone [phone]  Monday 7:30 am-7 pm    Monday 8:30 am-6:30 pm  Tuesday-Friday 7:30 am-5 pm   Tuesday-Friday 8:30 am-4:30 pm    Pharmacy hours:  Phone [phone]  Monday 8:30 am-7pm  Tuesday-Friday 8:30am-6 pm                                       Mychart assistance [phone]        We would like to hear from you, how was your visit today?    Daisy Vazquez  Patient Information Supervisor   Patient Care Supervisor  [facility], and [facility], and [facility]  [phone]

## 2017-10-04 NOTE — NURSING NOTE
"Chief Complaint   Patient presents with     Prenatal Care     Panel Management       Initial /60 (BP Location: Right arm, Patient Position: Chair, Cuff Size: Adult Large)  Pulse 100  Temp 97.6  F (36.4  C) (Temporal)  Resp 16  Ht 5' 6.53\" (1.69 m)  Wt 263 lb 6.4 oz (119.5 kg)  LMP 01/11/2017 (Exact Date)  BMI 41.84 kg/m2 Estimated body mass index is 41.84 kg/(m^2) as calculated from the following:    Height as of this encounter: 5' 6.53\" (1.69 m).    Weight as of this encounter: 263 lb 6.4 oz (119.5 kg).  Medication Reconciliation: complete  Berenice Welch, CMA    "

## 2017-10-05 ENCOUNTER — TELEPHONE (OUTPATIENT)
Dept: FAMILY MEDICINE | Facility: OTHER | Age: 27
End: 2017-10-05

## 2017-10-06 NOTE — TELEPHONE ENCOUNTER
S: Triage telephone call    B: Valarie is a 27 y.o.  @ 38w 1d who calls with complaints of pain    A: Valarie called with complaints of abdominal tightening and pain that comes and goes every few minutes since about 1600. She is able to carry a conversation well. She states that she has some low abdominal cramping when she is sitting/resting and that the pain increases when she is standing or walking. She was in clinic yesterday with Dr. Crisostomo and denies complications. She states that her baby is active, her membranes are intact and she does not have any bleeding vaginally.     R: She is choosing to rest, take a warm tub and hydrate. She will call the [facility] Center when her contractions increase in strength or frequency, her water breaks, she is bleeding, baby has decreased FM or other concerns.

## 2017-10-07 ENCOUNTER — TELEPHONE (OUTPATIENT)
Dept: FAMILY MEDICINE | Facility: OTHER | Age: 27
End: 2017-10-07

## 2017-10-08 NOTE — TELEPHONE ENCOUNTER
"S: Triage phone call    B: Valarie is a 27 y.o.  @ 38w 3d who called with complaint of leaking fluid    A: Valarie states that she was sitting and when she stood, she had a small amount of clear discharge into her underwear. She denies bloody show or odor and states that it has not repeated. She has been ifrah every 12-15\", mild with low back ache. She states that she was seen by Dr. Crisostomo on Wednesday with no concerns. Baby has been active    R: She was offered to come to [facility] Center for assessment or to monitor for further vaginal discharge. She chose to monitor at home. She was encouraged to place a pad to monitor for further vaginal discharge. She will call the [facility]thing Center if she has further concerns, leaking fluid, decreased fetal movement, vaginal bleeding.  "

## 2017-10-13 ENCOUNTER — PRENATAL OFFICE VISIT (OUTPATIENT)
Dept: FAMILY MEDICINE | Facility: OTHER | Age: 27
End: 2017-10-13
Payer: COMMERCIAL

## 2017-10-13 VITALS
RESPIRATION RATE: 16 BRPM | BODY MASS INDEX: 41.42 KG/M2 | HEIGHT: 67 IN | TEMPERATURE: 98 F | SYSTOLIC BLOOD PRESSURE: 98 MMHG | WEIGHT: 263.9 LBS | HEART RATE: 92 BPM | DIASTOLIC BLOOD PRESSURE: 60 MMHG

## 2017-10-13 DIAGNOSIS — Z34.83 ENCOUNTER FOR SUPERVISION OF OTHER NORMAL PREGNANCY IN THIRD TRIMESTER: Primary | ICD-10-CM

## 2017-10-13 PROCEDURE — 99207 ZZC PRENATAL VISIT: CPT | Performed by: FAMILY MEDICINE

## 2017-10-13 RX ORDER — IBUPROFEN 200 MG
800 TABLET ORAL
Status: CANCELLED | OUTPATIENT
Start: 2017-10-13

## 2017-10-13 RX ORDER — OXYTOCIN/0.9 % SODIUM CHLORIDE 30/500 ML
1-24 PLASTIC BAG, INJECTION (ML) INTRAVENOUS CONTINUOUS
Status: CANCELLED | OUTPATIENT
Start: 2017-10-13

## 2017-10-13 RX ORDER — NALOXONE HYDROCHLORIDE 0.4 MG/ML
.1-.4 INJECTION, SOLUTION INTRAMUSCULAR; INTRAVENOUS; SUBCUTANEOUS
Status: CANCELLED | OUTPATIENT
Start: 2017-10-13

## 2017-10-13 RX ORDER — LIDOCAINE 40 MG/G
CREAM TOPICAL
Status: CANCELLED | OUTPATIENT
Start: 2017-10-13

## 2017-10-13 RX ORDER — OXYCODONE AND ACETAMINOPHEN 5; 325 MG/1; MG/1
1 TABLET ORAL
Status: CANCELLED | OUTPATIENT
Start: 2017-10-13

## 2017-10-13 RX ORDER — TERBUTALINE SULFATE 1 MG/ML
0.25 INJECTION, SOLUTION SUBCUTANEOUS
Status: CANCELLED | OUTPATIENT
Start: 2017-10-13

## 2017-10-13 RX ORDER — SODIUM CHLORIDE, SODIUM LACTATE, POTASSIUM CHLORIDE, CALCIUM CHLORIDE 600; 310; 30; 20 MG/100ML; MG/100ML; MG/100ML; MG/100ML
INJECTION, SOLUTION INTRAVENOUS CONTINUOUS
Status: CANCELLED | OUTPATIENT
Start: 2017-10-13

## 2017-10-13 RX ORDER — METHYLERGONOVINE MALEATE 0.2 MG/ML
200 INJECTION INTRAVENOUS
Status: CANCELLED | OUTPATIENT
Start: 2017-10-13

## 2017-10-13 RX ORDER — ONDANSETRON 2 MG/ML
4 INJECTION INTRAMUSCULAR; INTRAVENOUS EVERY 6 HOURS PRN
Status: CANCELLED | OUTPATIENT
Start: 2017-10-13

## 2017-10-13 RX ORDER — OXYTOCIN 10 [USP'U]/ML
10 INJECTION, SOLUTION INTRAMUSCULAR; INTRAVENOUS
Status: CANCELLED | OUTPATIENT
Start: 2017-10-13

## 2017-10-13 RX ORDER — FENTANYL CITRATE 50 UG/ML
50-100 INJECTION, SOLUTION INTRAMUSCULAR; INTRAVENOUS
Status: CANCELLED | OUTPATIENT
Start: 2017-10-13

## 2017-10-13 RX ORDER — ACETAMINOPHEN 325 MG/1
650 TABLET ORAL EVERY 4 HOURS PRN
Status: CANCELLED | OUTPATIENT
Start: 2017-10-13

## 2017-10-13 RX ORDER — CARBOPROST TROMETHAMINE 250 UG/ML
250 INJECTION, SOLUTION INTRAMUSCULAR
Status: CANCELLED | OUTPATIENT
Start: 2017-10-13

## 2017-10-13 RX ORDER — OXYTOCIN/0.9 % SODIUM CHLORIDE 30/500 ML
100-340 PLASTIC BAG, INJECTION (ML) INTRAVENOUS CONTINUOUS PRN
Status: CANCELLED | OUTPATIENT
Start: 2017-10-13

## 2017-10-13 ASSESSMENT — PAIN SCALES - GENERAL: PAINLEVEL: EXTREME PAIN (8)

## 2017-10-13 NOTE — NURSING NOTE
"Chief Complaint   Patient presents with     Prenatal Care     Panel Management       Initial BP 98/60 (BP Location: Right arm, Patient Position: Chair, Cuff Size: Adult Large)  Pulse 92  Temp 98  F (36.7  C) (Temporal)  Resp 16  Ht 5' 6.53\" (1.69 m)  Wt 263 lb 14.4 oz (119.7 kg)  LMP 01/11/2017 (Exact Date)  BMI 41.92 kg/m2 Estimated body mass index is 41.92 kg/(m^2) as calculated from the following:    Height as of this encounter: 5' 6.53\" (1.69 m).    Weight as of this encounter: 263 lb 14.4 oz (119.7 kg).  Medication Reconciliation: complete  Berenice Welch, BEAR    "

## 2017-10-13 NOTE — PROGRESS NOTES
Contractions have picked up quite a bit.  Now about every 12-20 minutes.  Can't sleep much due to discomfort.  No bleeding, increased discharge.  Thought her water broke, but didn't.  Has been feeling faint a few times.  Has had to eat quickly to help with this.  Cervix is as noted.  Cephalic position confirmed by Leopold maneuvers and cervical exam.  Prenatal flowsheet information is reviewed.  Discussed indications, risks, complications and failure rate of inductions.  Induction date set: pitocin on 10/14/2017.  Reportable signs and symptoms discussed.  F/u in 1 week.

## 2017-10-13 NOTE — PATIENT INSTRUCTIONS
Thank you for visiting [facility]    Your induction is scheduled tomorrow morning at 7 AM in [place].  Please eat a light breakfast before coming in.    Contact us or return if questions or concerns.     Have a nice day!    Dr. Crisostomo       If you had imaging scheduled please refer to your radiology prep sheet.    Appointment    Date_______________     Time_____________    Day:   M TU W TH F    With____________________________    Location_________________________    If you need medication refills, please contact your pharmacy 3 days before your prescriptions runs out. If you are out of refills, your pharmacy will contact contact the clinic.    Contact us or return if questions or concerns.     -Your Care Team:  MD Shi Frazier PA-C Joel De Haan, PA-C Elizabeth McLean, APRN CNP    General information about your clinic      Clinic hours:     Lab hours:  Phone [phone]  Monday 7:30 am-7 pm    Monday 8:30 am-6:30 pm  Tuesday-Friday 7:30 am-5 pm   Tuesday-Friday 8:30 am-4:30 pm    Pharmacy hours:  Phone [phone]  Monday 8:30 am-7pm  Tuesday-Friday 8:30am-6 pm                                       Mychart assistance [phone]        We would like to hear from you, how was your visit today?    Daisy Vazquez  Patient Information Supervisor   Patient Care Supervisor  [facility], and hospitals, and [facility]  [phone]

## 2017-10-13 NOTE — MR AVS SNAPSHOT
After Visit Summary   10/13/2017    Valarie Talavera    MRN: [id number]           Patient Information     Date Of Birth          1990        Visit Information        Provider Department      10/13/2017 2:30 PM Luis Crisostomo MD [facility]        Care Instructions    Thank you for visiting [facility]    Your induction is scheduled tomorrow morning at 7 AM in [place].  Please eat a light breakfast before coming in.    Contact us or return if questions or concerns.     Have a nice day!    Dr. Crisostomo       If you had imaging scheduled please refer to your radiology prep sheet.    Appointment    Date_______________     Time_____________    Day:   M TU W TH F    With____________________________    Location_________________________    If you need medication refills, please contact your pharmacy 3 days before your prescriptions runs out. If you are out of refills, your pharmacy will contact contact the clinic.    Contact us or return if questions or concerns.     -Your Care Team:  MD Shi Frazier PA-C Joel De Haan, PA-C Elizabeth McLean, APRN CNP    General information about your clinic      Clinic hours:     Lab hours:  Phone [phone]  Monday 7:30 am-7 pm    Monday 8:30 am-6:30 pm  Tuesday-Friday 7:30 am-5 pm   Tuesday-Friday 8:30 am-4:30 pm    Pharmacy hours:  Phone [phone]  Monday 8:30 am-7pm  Tuesday-Friday 8:30am-6 pm                                       Mychart assistance [phone]        We would like to hear from you, how was your visit today?    Daisy Vazquez  Patient Information Supervisor   Patient Care Supervisor  [facility], and [facility], and [facility]  [phone]             Follow-ups after your visit        Who to contact     If you have questions or need follow up information about today's clinic visit or your schedule  "please contact [facility] directly at [phone].  Normal or non-critical lab and imaging results will be communicated to you by MyChart, letter or phone within 4 business days after the clinic has received the results. If you do not hear from us within 7 days, please contact the clinic through Ezeecubehart or phone. If you have a critical or abnormal lab result, we will notify you by phone as soon as possible.  Submit refill requests through Testin or call your pharmacy and they will forward the refill request to us. Please allow 3 business days for your refill to be completed.          Additional Information About Your Visit        Ezeecubeharhhgregg Information     Testin lets you send messages to your doctor, view your test results, renew your prescriptions, schedule appointments and more. To sign up, go to [email or web address] . Click on \"Log in\" on the left side of the screen, which will take you to the Welcome page. Then click on \"Sign up Now\" on the right side of the page.     You will be asked to enter the access code listed below, as well as some personal information. Please follow the directions to create your username and password.     Your access code is: [email or web address]  Expires: 2017 10:37 AM     Your access code will  in 90 days. If you need help or a new code, please call your [facility] clinic or [phone].        Care EveryWhere ID     This is your Care EveryWhere ID. This could be used by other organizations to access your [facility] medical records  [id number]        Your Vitals Were     Pulse Temperature Respirations Height Last Period BMI (Body Mass Index)    92 98  F (36.7  C) (Temporal) 16 5' 6.53\" (1.69 m) 2017 (Exact Date) 41.92 kg/m2       Blood Pressure from Last 3 Encounters:   10/13/17 98/60   10/04/17 104/60   17 123/64    Weight from Last 3 Encounters:   10/13/17 263 lb 14.4 oz (119.7 kg)   10/04/17 263 lb 6.4 oz (119.5 kg)   17 263 lb (119.3 kg)    "           Today, you had the following     No orders found for display       Primary Care Provider Office Phone # Fax #    Luis Crisostomo [phone] [address] [place] [address]        Equal Access to Services     JOVITA CASTELLANOS : Hadii aad ku hadmoiseso Soomaali, waaxda luqadaha, qaybta kaalmada adeegyada, waxsanto koon juan gandhi lamraiomarlene babatunde. So [facility] [phone].    ATENCIÓN: Si habla español, tiene a browne disposición servicios gratuitos de asistencia lingüística. Llame al [phone].    We comply with applicable federal civil rights laws and [place] laws. We do not discriminate on the basis of race, color, national origin, age, disability, sex, sexual orientation, or gender identity.            Thank you!     Thank you for choosing [facility]  for your care. Our goal is always to provide you with excellent care. Hearing back from our patients is one way we can continue to improve our services. Please take a few minutes to complete the written survey that you may receive in the mail after your visit with us. Thank you!             Your Updated Medication List - Protect others around you: Learn how to safely use, store and throw away your medicines at www.disposemymeds.org.          This list is accurate as of: 10/13/17  4:15 PM.  Always use your most recent med list.                   Brand Name Dispense Instructions for use Diagnosis    omeprazole 20 MG CR capsule    priLOSEC    90 capsule    Take 1 capsule (20 mg) by mouth daily    Encounter for supervision of other normal pregnancy in third trimester       PRENATAL VITAMINS PO      Take 1 tablet by mouth daily        VITAMIN D PO      Take 500 Units by mouth 2 times daily

## 2017-10-14 ENCOUNTER — ANESTHESIA EVENT (OUTPATIENT)
Dept: OBGYN | Facility: CLINIC | Age: 27
End: 2017-10-14
Payer: COMMERCIAL

## 2017-10-14 ENCOUNTER — HOSPITAL ENCOUNTER (INPATIENT)
Facility: CLINIC | Age: 27
LOS: 2 days | Discharge: HOME | End: 2017-10-16
Attending: FAMILY MEDICINE | Admitting: FAMILY MEDICINE
Payer: COMMERCIAL

## 2017-10-14 ENCOUNTER — ANESTHESIA (OUTPATIENT)
Dept: OBGYN | Facility: CLINIC | Age: 27
End: 2017-10-14
Payer: COMMERCIAL

## 2017-10-14 PROBLEM — Z34.90 TERM PREGNANCY: Status: ACTIVE | Noted: 2017-10-14

## 2017-10-14 LAB
ABO + RH BLD: NORMAL
ABO + RH BLD: NORMAL
SPECIMEN EXP DATE BLD: NORMAL

## 2017-10-14 PROCEDURE — 59400 OBSTETRICAL CARE: CPT | Performed by: FAMILY MEDICINE

## 2017-10-14 PROCEDURE — 25000132 ZZH RX MED GY IP 250 OP 250 PS 637: Performed by: FAMILY MEDICINE

## 2017-10-14 PROCEDURE — 86900 BLOOD TYPING SEROLOGIC ABO: CPT | Performed by: FAMILY MEDICINE

## 2017-10-14 PROCEDURE — 12000029 ZZH R&B OB INTERMEDIATE

## 2017-10-14 PROCEDURE — 86780 TREPONEMA PALLIDUM: CPT | Performed by: FAMILY MEDICINE

## 2017-10-14 PROCEDURE — 3E033VJ INTRODUCTION OF OTHER HORMONE INTO PERIPHERAL VEIN, PERCUTANEOUS APPROACH: ICD-10-PCS | Performed by: FAMILY MEDICINE

## 2017-10-14 PROCEDURE — 86901 BLOOD TYPING SEROLOGIC RH(D): CPT | Performed by: FAMILY MEDICINE

## 2017-10-14 PROCEDURE — 10907ZC DRAINAGE OF AMNIOTIC FLUID, THERAPEUTIC FROM PRODUCTS OF CONCEPTION, VIA NATURAL OR ARTIFICIAL OPENING: ICD-10-PCS | Performed by: FAMILY MEDICINE

## 2017-10-14 PROCEDURE — 25000125 ZZHC RX 250: Performed by: FAMILY MEDICINE

## 2017-10-14 PROCEDURE — 3E0S3BZ INTRODUCTION OF ANESTHETIC AGENT INTO EPIDURAL SPACE, PERCUTANEOUS APPROACH: ICD-10-PCS | Performed by: FAMILY MEDICINE

## 2017-10-14 PROCEDURE — 25000125 ZZHC RX 250: Performed by: NURSE ANESTHETIST, CERTIFIED REGISTERED

## 2017-10-14 PROCEDURE — S0020 INJECTION, BUPIVICAINE HYDRO: HCPCS | Performed by: NURSE ANESTHETIST, CERTIFIED REGISTERED

## 2017-10-14 PROCEDURE — 37000011 ZZH ANESTHESIA WARD SERVICE: Performed by: NURSE ANESTHETIST, CERTIFIED REGISTERED

## 2017-10-14 PROCEDURE — 40000671 ZZH STATISTIC ANESTHESIA CASE

## 2017-10-14 PROCEDURE — 25000128 H RX IP 250 OP 636: Performed by: NURSE ANESTHETIST, CERTIFIED REGISTERED

## 2017-10-14 PROCEDURE — 72200001 ZZH LABOR CARE VAGINAL DELIVERY SINGLE

## 2017-10-14 PROCEDURE — 12000031 ZZH R&B OB CRITICAL

## 2017-10-14 PROCEDURE — 00HU33Z INSERTION OF INFUSION DEVICE INTO SPINAL CANAL, PERCUTANEOUS APPROACH: ICD-10-PCS | Performed by: FAMILY MEDICINE

## 2017-10-14 PROCEDURE — 25000128 H RX IP 250 OP 636: Performed by: FAMILY MEDICINE

## 2017-10-14 RX ORDER — PRENATAL VIT/IRON FUM/FOLIC AC 27MG-0.8MG
1 TABLET ORAL DAILY
Status: DISCONTINUED | OUTPATIENT
Start: 2017-10-14 | End: 2017-10-16 | Stop reason: HOSPADM

## 2017-10-14 RX ORDER — NALOXONE HYDROCHLORIDE 0.4 MG/ML
.1-.4 INJECTION, SOLUTION INTRAMUSCULAR; INTRAVENOUS; SUBCUTANEOUS
Status: DISCONTINUED | OUTPATIENT
Start: 2017-10-14 | End: 2017-10-16 | Stop reason: HOSPADM

## 2017-10-14 RX ORDER — OXYCODONE HYDROCHLORIDE 5 MG/1
5-10 TABLET ORAL
Status: DISCONTINUED | OUTPATIENT
Start: 2017-10-14 | End: 2017-10-16 | Stop reason: HOSPADM

## 2017-10-14 RX ORDER — ONDANSETRON 2 MG/ML
4 INJECTION INTRAMUSCULAR; INTRAVENOUS EVERY 6 HOURS PRN
Status: DISCONTINUED | OUTPATIENT
Start: 2017-10-14 | End: 2017-10-14

## 2017-10-14 RX ORDER — OXYTOCIN/0.9 % SODIUM CHLORIDE 30/500 ML
100-340 PLASTIC BAG, INJECTION (ML) INTRAVENOUS CONTINUOUS PRN
Status: DISCONTINUED | OUTPATIENT
Start: 2017-10-14 | End: 2017-10-14

## 2017-10-14 RX ORDER — FENTANYL CITRATE 50 UG/ML
50-100 INJECTION, SOLUTION INTRAMUSCULAR; INTRAVENOUS
Status: DISCONTINUED | OUTPATIENT
Start: 2017-10-14 | End: 2017-10-14

## 2017-10-14 RX ORDER — NALOXONE HYDROCHLORIDE 0.4 MG/ML
.1-.4 INJECTION, SOLUTION INTRAMUSCULAR; INTRAVENOUS; SUBCUTANEOUS
Status: DISCONTINUED | OUTPATIENT
Start: 2017-10-14 | End: 2017-10-14

## 2017-10-14 RX ORDER — LIDOCAINE HYDROCHLORIDE AND EPINEPHRINE 15; 5 MG/ML; UG/ML
INJECTION, SOLUTION EPIDURAL PRN
Status: DISCONTINUED | OUTPATIENT
Start: 2017-10-14 | End: 2017-10-14

## 2017-10-14 RX ORDER — OXYCODONE AND ACETAMINOPHEN 5; 325 MG/1; MG/1
1 TABLET ORAL
Status: DISCONTINUED | OUTPATIENT
Start: 2017-10-14 | End: 2017-10-14

## 2017-10-14 RX ORDER — OXYTOCIN 10 [USP'U]/ML
10 INJECTION, SOLUTION INTRAMUSCULAR; INTRAVENOUS
Status: DISCONTINUED | OUTPATIENT
Start: 2017-10-14 | End: 2017-10-14

## 2017-10-14 RX ORDER — HYDROCORTISONE 2.5 %
CREAM (GRAM) TOPICAL 3 TIMES DAILY PRN
Status: DISCONTINUED | OUTPATIENT
Start: 2017-10-14 | End: 2017-10-16 | Stop reason: HOSPADM

## 2017-10-14 RX ORDER — SODIUM CHLORIDE 9 MG/ML
INJECTION, SOLUTION INTRAVENOUS CONTINUOUS
Status: DISCONTINUED | OUTPATIENT
Start: 2017-10-14 | End: 2017-10-14

## 2017-10-14 RX ORDER — ACETAMINOPHEN 325 MG/1
650 TABLET ORAL EVERY 4 HOURS PRN
Status: DISCONTINUED | OUTPATIENT
Start: 2017-10-14 | End: 2017-10-16 | Stop reason: HOSPADM

## 2017-10-14 RX ORDER — SODIUM CHLORIDE, SODIUM LACTATE, POTASSIUM CHLORIDE, CALCIUM CHLORIDE 600; 310; 30; 20 MG/100ML; MG/100ML; MG/100ML; MG/100ML
INJECTION, SOLUTION INTRAVENOUS CONTINUOUS
Status: DISCONTINUED | OUTPATIENT
Start: 2017-10-14 | End: 2017-10-14

## 2017-10-14 RX ORDER — LIDOCAINE 40 MG/G
CREAM TOPICAL
Status: DISCONTINUED | OUTPATIENT
Start: 2017-10-14 | End: 2017-10-14

## 2017-10-14 RX ORDER — TERBUTALINE SULFATE 1 MG/ML
0.25 INJECTION, SOLUTION SUBCUTANEOUS
Status: DISCONTINUED | OUTPATIENT
Start: 2017-10-14 | End: 2017-10-14

## 2017-10-14 RX ORDER — METHYLERGONOVINE MALEATE 0.2 MG/ML
200 INJECTION INTRAVENOUS
Status: DISCONTINUED | OUTPATIENT
Start: 2017-10-14 | End: 2017-10-14

## 2017-10-14 RX ORDER — EPHEDRINE SULFATE 50 MG/ML
5 INJECTION, SOLUTION INTRAMUSCULAR; INTRAVENOUS; SUBCUTANEOUS
Status: DISCONTINUED | OUTPATIENT
Start: 2017-10-14 | End: 2017-10-14

## 2017-10-14 RX ORDER — AMOXICILLIN 250 MG
1-2 CAPSULE ORAL 2 TIMES DAILY
Status: DISCONTINUED | OUTPATIENT
Start: 2017-10-14 | End: 2017-10-16 | Stop reason: HOSPADM

## 2017-10-14 RX ORDER — ONDANSETRON 4 MG/1
4 TABLET, ORALLY DISINTEGRATING ORAL EVERY 6 HOURS PRN
Status: DISCONTINUED | OUTPATIENT
Start: 2017-10-14 | End: 2017-10-14

## 2017-10-14 RX ORDER — OXYTOCIN/0.9 % SODIUM CHLORIDE 30/500 ML
1-24 PLASTIC BAG, INJECTION (ML) INTRAVENOUS CONTINUOUS
Status: DISCONTINUED | OUTPATIENT
Start: 2017-10-14 | End: 2017-10-14

## 2017-10-14 RX ORDER — IBUPROFEN 400 MG/1
400-800 TABLET, FILM COATED ORAL EVERY 6 HOURS PRN
Status: DISCONTINUED | OUTPATIENT
Start: 2017-10-14 | End: 2017-10-16 | Stop reason: HOSPADM

## 2017-10-14 RX ORDER — NALBUPHINE HYDROCHLORIDE 10 MG/ML
2.5-5 INJECTION, SOLUTION INTRAMUSCULAR; INTRAVENOUS; SUBCUTANEOUS EVERY 6 HOURS PRN
Status: DISCONTINUED | OUTPATIENT
Start: 2017-10-14 | End: 2017-10-14

## 2017-10-14 RX ORDER — LANOLIN 100 %
OINTMENT (GRAM) TOPICAL
Status: DISCONTINUED | OUTPATIENT
Start: 2017-10-14 | End: 2017-10-16 | Stop reason: HOSPADM

## 2017-10-14 RX ORDER — CARBOPROST TROMETHAMINE 250 UG/ML
250 INJECTION, SOLUTION INTRAMUSCULAR
Status: DISCONTINUED | OUTPATIENT
Start: 2017-10-14 | End: 2017-10-14

## 2017-10-14 RX ORDER — OXYTOCIN 10 [USP'U]/ML
10 INJECTION, SOLUTION INTRAMUSCULAR; INTRAVENOUS
Status: DISCONTINUED | OUTPATIENT
Start: 2017-10-14 | End: 2017-10-16 | Stop reason: HOSPADM

## 2017-10-14 RX ORDER — IBUPROFEN 800 MG/1
800 TABLET, FILM COATED ORAL
Status: DISCONTINUED | OUTPATIENT
Start: 2017-10-14 | End: 2017-10-14

## 2017-10-14 RX ORDER — ACETAMINOPHEN 325 MG/1
650 TABLET ORAL EVERY 4 HOURS PRN
Status: DISCONTINUED | OUTPATIENT
Start: 2017-10-14 | End: 2017-10-14

## 2017-10-14 RX ORDER — MISOPROSTOL 200 UG/1
400 TABLET ORAL
Status: DISCONTINUED | OUTPATIENT
Start: 2017-10-14 | End: 2017-10-16 | Stop reason: HOSPADM

## 2017-10-14 RX ORDER — BUPIVACAINE HYDROCHLORIDE 2.5 MG/ML
INJECTION, SOLUTION INFILTRATION; PERINEURAL PRN
Status: DISCONTINUED | OUTPATIENT
Start: 2017-10-14 | End: 2017-10-14

## 2017-10-14 RX ORDER — OXYTOCIN/0.9 % SODIUM CHLORIDE 30/500 ML
100 PLASTIC BAG, INJECTION (ML) INTRAVENOUS CONTINUOUS
Status: DISCONTINUED | OUTPATIENT
Start: 2017-10-14 | End: 2017-10-16 | Stop reason: HOSPADM

## 2017-10-14 RX ORDER — OXYTOCIN/0.9 % SODIUM CHLORIDE 30/500 ML
340 PLASTIC BAG, INJECTION (ML) INTRAVENOUS CONTINUOUS PRN
Status: DISCONTINUED | OUTPATIENT
Start: 2017-10-14 | End: 2017-10-16 | Stop reason: HOSPADM

## 2017-10-14 RX ORDER — BISACODYL 10 MG
10 SUPPOSITORY, RECTAL RECTAL DAILY PRN
Status: DISCONTINUED | OUTPATIENT
Start: 2017-10-16 | End: 2017-10-16 | Stop reason: HOSPADM

## 2017-10-14 RX ORDER — LIDOCAINE HYDROCHLORIDE 10 MG/ML
INJECTION, SOLUTION EPIDURAL; INFILTRATION; INTRACAUDAL; PERINEURAL
Status: DISCONTINUED
Start: 2017-10-14 | End: 2017-10-14 | Stop reason: HOSPADM

## 2017-10-14 RX ADMIN — OMEPRAZOLE 20 MG: 20 CAPSULE, DELAYED RELEASE ORAL at 21:53

## 2017-10-14 RX ADMIN — LIDOCAINE HYDROCHLORIDE,EPINEPHRINE BITARTRATE 3 ML: 15; .005 INJECTION, SOLUTION EPIDURAL; INFILTRATION; INTRACAUDAL; PERINEURAL at 11:43

## 2017-10-14 RX ADMIN — IBUPROFEN 800 MG: 400 TABLET ORAL at 21:53

## 2017-10-14 RX ADMIN — BUPIVACAINE HYDROCHLORIDE 5 ML: 2.5 INJECTION, SOLUTION EPIDURAL; INFILTRATION; INTRACAUDAL; PERINEURAL at 11:48

## 2017-10-14 RX ADMIN — SODIUM CHLORIDE, POTASSIUM CHLORIDE, SODIUM LACTATE AND CALCIUM CHLORIDE: 600; 310; 30; 20 INJECTION, SOLUTION INTRAVENOUS at 11:17

## 2017-10-14 RX ADMIN — OXYTOCIN-SODIUM CHLORIDE 0.9% IV SOLN 30 UNIT/500ML 1 MILLI-UNITS/MIN: 30-0.9/5 SOLUTION at 07:52

## 2017-10-14 RX ADMIN — SODIUM CHLORIDE, POTASSIUM CHLORIDE, SODIUM LACTATE AND CALCIUM CHLORIDE: 600; 310; 30; 20 INJECTION, SOLUTION INTRAVENOUS at 07:52

## 2017-10-14 RX ADMIN — Medication 10 ML/HR: at 11:56

## 2017-10-14 RX ADMIN — Medication: at 11:55

## 2017-10-14 RX ADMIN — Medication: at 21:53

## 2017-10-14 RX ADMIN — BUPIVACAINE HYDROCHLORIDE 5 ML: 2.5 INJECTION, SOLUTION EPIDURAL; INFILTRATION; INTRACAUDAL; PERINEURAL at 11:51

## 2017-10-14 RX ADMIN — SENNOSIDES AND DOCUSATE SODIUM 2 TABLET: 8.6; 5 TABLET ORAL at 21:53

## 2017-10-14 NOTE — IP AVS SNAPSHOT
[facility]    [address]     [place] [address]    Phone:  [phone]                                       After Visit Summary   10/14/2017    Valarie Talavera    MRN: [id number]           After Visit Summary Signature Page     I have received my discharge instructions, and my questions have been answered. I have discussed any challenges I see with this plan with the nurse or doctor.    ..........................................................................................................................................  Patient/Patient Representative Signature      ..........................................................................................................................................  Patient Representative Print Name and Relationship to Patient    ..................................................               ................................................  Date                                            Time    ..........................................................................................................................................  Reviewed by Signature/Title    ...................................................              ..............................................  Date                                                            Time

## 2017-10-14 NOTE — ANESTHESIA PROCEDURE NOTES
Peripheral nerve/Neuraxial procedure note : epidural catheter  Pre-Procedure  Performed by  GRUPO HANSEN   Location: OB      Pre-Anesthestic Checklist: patient identified, IV checked, risks and benefits discussed, informed consent, monitors and equipment checked, pre-op evaluation and at physician/surgeon's request    Timeout  Correct Patient: Yes   Correct Procedure: Yes   Correct Site: Yes   Correct Laterality: N/A   Correct Position: Yes   Site Marked: N/A   .   Procedure Documentation    .    Procedure:    Epidural catheter.  Insertion Site:L3-4  (midline approach) Injection technique: LORT air   Local skin infiltrated with 3 mL of 1% lidocaine.       Patient Prep;mask, sterile gloves, povidone-iodine 7.5% surgical scrub, patient draped.  .  Needle: Touhy needle, Solano Needle Gauge: 18.    Needle Length (Inches) 3.5  # of attempts: 1 and # of redirects:  .   Catheter: 20 G . .  Catheter threaded easily  4 cm epidural space.  .   .    Assessment/Narrative  Paresthesias: No.  .  .  Aspiration negative for heme or CSF  . Test dose of 3 mL lidocaine 1.5% w/ 1:200,000 epinephrine at 11:43.  Test dose negative for signs of intravascular, subdural or intrathecal injection. Sensory Level Left: T6  Sensory Level Right: T6  Comments:  Pt. Sitting at bedside. Risks, benefits and alternatives of epidural analgesia discussed with pt.  Her questions were answered, she consents/wishes to proceed as planned.  Back exam landmarks identified, skin prep with betadine.  Lidocaine infiltration at L3 - L4. ES identified via ROSIE (Air) after 1 attempt (s) and 0 redirect (s).  EC passed 4 centimeters without paresthesias or resistance noted.  Negative heme / CSF aspirated.  EC securred with tegaderm/medipore tape. Patient appeared to tolerate procedure well.

## 2017-10-14 NOTE — PROGRESS NOTES
AROM attempted earlier.  Angle of cervix and thickness of amniotic membrane made it challenging, but didn't want to cause fetal scalp damage.  No fluid.  AROM attempted again just now with return of clear fluid.  Cx:  6-7/70/-1.  Anticipate NVD.

## 2017-10-14 NOTE — PROGRESS NOTES
S: Delivery  B: induced Labor,  @ [id number] gestation, GBS negative   A: Patient delivered Vaginal at 1703 with Dr. encinas in attendance. Baby delivered and placed on mother's low abdomen for delayed cord clamping where baby was dried and stimulated. After cord clamped and cut, baby was placed skin to skin on mother's chest within 5 minutes following delivery . Apgars 9/10. Placenta was delivered @ 1750 followed by administration of oxytocin. Bonding initiated with mom and baby. Educated mother on importance of exclusive breast feeding, expected feeding readiness cues and encouraged her to observe for feeding cues. Mother informed that breast feeding assistance would be provided. See flowsheet for VS and PP checks. Labor care plan goals met.  R: Expect routine postpartum care. Anticipate first feeding within the hour.

## 2017-10-14 NOTE — PROGRESS NOTES
S:  Admission  B:  Valarie is a  @ 39w3d gestation, GBS -, Hep. B -, pregnancy uncomplicated. EFW 8 per MD  A:  Patient admitted to room 333 for elective induction  R Plan of care made with pt and spouse

## 2017-10-14 NOTE — L&D DELIVERY NOTE
OB Vaginal Delivery Note    Valarie Talavera MRN# [id number]   Age: 27 year old YOB: 1990     Pt presented for elective induction of labor at term due to several days of prodromal labor as well as concerns about a nuchal cord seen on ultrasound exams.     GA: 39w3d  GP:   Labor Complications: None   EBL: 150  mL  QBL: 150 mL  Delivery Type: Vaginal, Spontaneous Delivery   ROM to Delivery Time: 1h 31m   Weight: 3.459 kg (7 lb 10 oz)    1 Minute 5 Minute 10 Minute   Apgar Totals: 9    10          ARIES REBOLLAR;DONALD BOSE     Delivery Details:  Valarie Talavera, a 27 year old  female delivered a viable male infant with apgars of 9   and 10  . Patient underwent elective induction with pitocin and later followed by AROM and was fully dilated and pushing after 2  0 hours 40  53 minutes in active labor.  Delivery was via vaginal, spontaneous delivery  to a sterile field under epidural  anesthesia. Infant delivered in vertex  left  occiput  anterior  position. Anterior and posterior shoulders delivered without difficulty. The cord was clamped, cut twice and 3 vessels  were noted. Cord blood was obtained in routine fashion with the following disposition: lab .  Pitocin was administered after delivery of the baby.     Cord complications: none   Placenta delivered at 10/14  5:10 PM . Placental disposition was Hospital disposal . Fundal massage performed and fundus found to be firm.     Episiotomy: none    Perineum, vagina, cervix were inspected, and NO lacerations were noted.    Excellent hemostasis was noted. Needle count correct. Infant and patient in delivery room in good and stable condition. Mom plans to breastfeed.       Labor Event Times    Labor onset date:  10/14/17 Onset time:   2:10 PM   Dilation complete date:  10/14/17 Complete time:   4:50 PM   Start pushing date/time:  10/14/2017 1650            Labor Length    1st Stage (hrs):  2 (min):  40   2nd Stage (hrs):  0 (min):   "13   3rd Stage (hrs):  0 (min):  7      Labor Events     labor?:  No    steroids:  None   Labor Type:  Induction      Antibiotics received during labor?:  No      Rupture date/time: 10/14/17 1532   Rupture type:  Artificial Rupture of Membranes   Fluid color:  Clear   Fluid odor:  Normal      Induction:  Oxytocin, AROM   Induction date/time:     Cervical ripening date/time:     Indications for induction:  Elective      1:1 continuous labor support provided by?:  none Labor partogram used?:  no         Delivery/Placenta Date and Time    Delivery Date:  10/14/17 Delivery Time:   5:03 PM   Placenta Date/Time:  10/14/2017  5:10 PM   Oxytocin given at the time of delivery:  after delivery of baby      Vaginal Counts    Initial count performed by 2 team members:   Two Team Members   LISY Isabel MD          Needles Suture Boyden Sponges Instruments   Initial counts 2  5    Added to count       Final counts 2  5       Placed during labor Accounted for at the end of labor   No NA   No NA   No NA      Final count performed by 2 team members:   Two Team Members   LISY Isabel MD         Final count correct?:  Yes         Apgars    Living status:  Living    1 Minute 5 Minute 10 Minute 15 Minute 20 Minute   Skin color: 1  2       Heart rate: 2  2       Reflex irritability: 2  2       Muscle tone: 2  2       Respiratory effort: 2  2       Total: 9  10             Cord    Vessels:  3 Vessels Complications:  None   Cord Blood Disposition:  Lab Gases Sent?:  No         [place] Resuscitation    Methods:  None      Output in Delivery Room:  Voided       Measurements    Weight:  7 lb 10 oz Length:  1' 8.5\"   Head circumference:  35.6 cm Chest circumference:  34.3 cm      Skin to Skin and Feeding Plan    Skin to skin initiation date/time: 10/14/17 1704   Skin to skin with:  Mother   Skin to skin end date/time:     How do you plan to feed your baby:  Breastfeeding    "   Labor Events and Shoulder Dystocia    Fetal Tracing Prior to Delivery:  Category 2   Shoulder dystocia present?:  Neg            Delivery (Maternal) (Provider to Complete) ([id number])    Episiotomy:  None   Perineal lacerations:  None    Vaginal laceration?:  No    Cervical laceration?:  No    Est. blood loss (mL):  150         Mother's Information  Mother: Valarie Talavera #[id number]    Start of Mother's Information     IO Blood Loss  10/14/17 1410 - 10/14/17 2136    Mom's I/O Activity            End of Mother's Information  Mother: Valarie Talavera #[id number]            Delivery - Provider to Complete ([id number])    Delivering clinician:  LUIS WASSERMAN   Attempted Delivery Types (Choose all that apply):  Spontaneous Vaginal Delivery   Delivery Type (Choose the 1 that will go to the Birth History):  Vaginal, Spontaneous Delivery                     Other personnel:   Provider Role   ARIES REBOLLAR Registered Nurse   DONALD PATINO Medical Student            Placenta    Delayed Cord Clamping:  Done   Date/Time:  10/14/2017  5:10 PM   Removal:  Spontaneous   Disposition:  Hospital disposal      Anesthesia    Method:  Epidural   Cervical dilation at placement:  4-7         Presentation and Position    Presentation:  Vertex   Position:  Left Occiput Anterior                  Patient was seen and examined by myself and Dr. Wasserman.  The note was then scribed by me.    Donald Patino, MS3    This patient was seen and examined by myself as well as the medical student.  The medical student has scribed the note and I have reviewed it, edited it appropriately and agree with the final documentation. Electronically signed by Luis Wasserman MD

## 2017-10-14 NOTE — IP AVS SNAPSHOT
MRN:[id number]                      After Visit Summary   10/14/2017    Valarie Talavera    MRN: [id number]           Thank you!     Thank you for choosing [facility] for your care. Our goal is always to provide you with excellent care. Hearing back from our patients is one way we can continue to improve our services. Please take a few minutes to complete the written survey that you may receive in the mail after you visit with us. Thank you!        Patient Information     Date Of Birth          1990        About your hospital stay     You were admitted on:  October 14, 2017 You last received care in the:  [facility]    You were discharged on:  October 16, 2017       Who to Call     For medical emergencies, please call 911.  For non-urgent questions about your medical care, please call your primary care provider or clinic, [phone]          Attending Provider     Provider Specialty    Luis Crisostomo MD [facility]       Primary Care Provider Office Phone # Fax #    Luis Crisostomo [phone]      After Care Instructions     Activity       Your activity upon discharge: activity as tolerated and no driving for today.  Pelvic rest for 6 weeks.            Diet       Follow this diet upon discharge: Regular                  Follow-up Appointments     Follow-up and recommended labs and tests        Follow up with primary care provider, Luis Crisostomo MD, within 6 weeks, for postpartum check.                  Further instructions from your care team       Postpartum Vaginal Delivery Instructions    Activity       Ask family and friends for help when you need it.    Do not place anything in your vagina for 6 weeks.    You are not restricted on other activities, but take it easy for a few weeks to allow your body to recover from delivery.  You are able to do any activities you feel up to that point.    No driving until you have stopped taking  your pain medications (usually two weeks after delivery).     Call your health care provider if you have any of these symptoms:       Increased pain, swelling, redness, or fluid around your stiches from an episiotomy or perineal tear.    A fever above 100.4 F (38 C) with or without chills when placing a thermometer under your tongue.    You soak a sanitary pad with blood within 1 hour, or you see blood clots larger than a golf ball.    Bleeding that lasts more than 6 weeks.    Vaginal discharge that smells bad.    Severe pain, cramping or tenderness in your lower belly area.    A need to urinate more frequently (use the toilet more often), more urgently (use the toilet very quickly), or it burns when you urinate.    Nausea and vomiting.    Redness, swelling or pain around a vein in your leg.    Problems breastfeeding or a red or painful area on your breast.    Chest pain and cough or are gasping for air.    Problems coping with sadness, anxiety, or depression.  If you have any concerns about hurting yourself or the baby, call your provider immediately.     You have questions or concerns after you return home.     Keep your hands clean:  Always wash your hands before touching your perineal area and stitches.  This helps reduce your risk of infection.  If your hands aren't dirty, you may use an alcohol hand-rub to clean your hands. Keep your nails clean and short.        Pending Results     No orders found from 10/12/2017 to 10/15/2017.            Statement of Approval     Ordered          10/16/17 0723  I have reviewed and agree with all the recommendations and orders detailed in this document.  EFFECTIVE NOW     Approved and electronically signed by:  Luis Crisostomo MD             Admission Information     Date & Time Provider Department Dept. Phone    10/14/2017 Luis Crisostomo MD [facility] [phone]      Your Vitals Were     Blood Pressure Pulse Temperature Respirations Last  "Period Pulse Oximetry    124/67 77 98.9  F (37.2  C) (Oral) 16 2017 (Exact Date) 100%      [facility]aharIngagePatient Information     [facility] lets you send messages to your doctor, view your test results, renew your prescriptions, schedule appointments and more. To sign up, go to [email or web address] . Click on \"Log in\" on the left side of the screen, which will take you to the Welcome page. Then click on \"Sign up Now\" on the right side of the page.     You will be asked to enter the access code listed below, as well as some personal information. Please follow the directions to create your username and password.     Your access code is: [email or web address]  Expires: 2017 10:37 AM     Your access code will  in 90 days. If you need help or a new code, please call your [place] clinic or [phone].        Care EveryWhere ID     This is your Care EveryWhere ID. This could be used by other organizations to access your [place] medical records  [id number]        Equal Access to Services     [facility] CenterYOUNG : Hadisamar Syed, samantha enrique, subhash cardoso, grant fine. So [facility] [phone].    ATENCIÓN: Si habla español, tiene a browne disposición servicios gratuitos de asistencia lingüística. Espinoza al [phone].    We comply with applicable federal civil rights laws and [place] laws. We do not discriminate on the basis of race, color, national origin, age, disability, sex, sexual orientation, or gender identity.               Review of your medicines      START taking        Dose / Directions    ibuprofen 800 MG tablet   Commonly known as:  ADVIL/MOTRIN   Used for:  NVD (normal vaginal delivery)        Dose:  800 mg   Take 1 tablet (800 mg) by mouth every 8 hours as needed for other (cramping)   Quantity:  90 tablet   Refills:  0       lanolin ointment   Used for:  NVD (normal vaginal delivery)        Apply topically every hour as needed for dry skin (sore nipples) "   Refills:  0       senna-docusate 8.6-50 MG per tablet   Commonly known as:  SENOKOT-S;PERICOLACE   Used for:  NVD (normal vaginal delivery)        Dose:  1-2 tablet   Take 1-2 tablets by mouth 2 times daily   Quantity:  100 tablet   Refills:  0         CONTINUE these medicines which may have CHANGED, or have new prescriptions. If we are uncertain of the size of tablets/capsules you have at home, strength may be listed as something that might have changed.        Dose / Directions    vitamin D 2000 UNITS tablet   This may have changed:  medication strength   Used for:  Breast feeding status of mother        Dose:  500 Units   Take 500 Units by mouth 2 times daily   Quantity:  60 tablet   Refills:  3         CONTINUE these medicines which have NOT CHANGED        Dose / Directions    omeprazole 20 MG CR capsule   Commonly known as:  priLOSEC   Used for:  Encounter for supervision of other normal pregnancy in third trimester        Dose:  20 mg   Take 1 capsule (20 mg) by mouth daily   Quantity:  90 capsule   Refills:  1       PRENATAL VITAMINS PO        Dose:  1 tablet   Take 1 tablet by mouth daily   Refills:  0            Where to get your medicines      These medications were sent to [place], [place] - [address]   [address] , [facility] [address]     Phone:  [phone]     ibuprofen 800 MG tablet    vitamin D 2000 UNITS tablet         Some of these will need a paper prescription and others can be bought over the counter. Ask your nurse if you have questions.     You don't need a prescription for these medications     lanolin ointment    senna-docusate 8.6-50 MG per tablet                Protect others around you: Learn how to safely use, store and throw away your medicines at www.disposemymeds.org.             Medication List: This is a list of all your medications and when to take them. Check marks below indicate your daily home schedule. Keep this list as a reference.       Medications           Morning Afternoon Evening Bedtime As Needed    ibuprofen 800 MG tablet   Commonly known as:  ADVIL/MOTRIN   Take 1 tablet (800 mg) by mouth every 8 hours as needed for other (cramping)   Last time this was given:  800 mg on 10/16/2017  8:48 AM                                lanolin ointment   Apply topically every hour as needed for dry skin (sore nipples)   Last time this was given:  10/14/2017  9:53 PM                                omeprazole 20 MG CR capsule   Commonly known as:  priLOSEC   Take 1 capsule (20 mg) by mouth daily   Last time this was given:  20 mg on 10/15/2017  8:32 PM                                PRENATAL VITAMINS PO   Take 1 tablet by mouth daily                                senna-docusate 8.6-50 MG per tablet   Commonly known as:  SENOKOT-S;PERICOLACE   Take 1-2 tablets by mouth 2 times daily   Last time this was given:  2 tablets on 10/14/2017  9:53 PM                                vitamin D 2000 UNITS tablet   Take 500 Units by mouth 2 times daily

## 2017-10-14 NOTE — H&P
[facility] Labor and Delivery History and Physical    Valarie Talavera MRN# [id number]   Age: 27 year old YOB: 1990     Date of Admission:  10/14/2017    Primary care provider: Luis Crisostomo           Chief Complaint:   Valarie Talavera is a 27 year old female  at 39w3d based on 10w4d US being admitted for elective induction of labor. She has had contractions for a few days which were increasingly uncomfortable and was scheduled for elective induction of labor with pitocin for today.    She had good prenatal care and pregnancy was complicated by decreased fetal movement at 37w2d and nuchal cord seen on ultrasound. Blood type is B+, she passed glucose tolerance testing and is GBS negative.           Pregnancy history:     OBSTETRIC HISTORY:    Obstetric History       T1      L1     SAB0   TAB0   Ectopic0   Multiple0   Live Births1       # Outcome Date GA Lbr Mook/2nd Weight Sex Delivery Anes PTL Lv   2 Current            1 Term 14 39w0d  3.742 kg (8 lb 4 oz) F  EPI N ZEUS      Name: Irene      Complications: Late prenatal care          EDC: Estimated Date of Delivery: 10/18/17    Prenatal Labs:   Lab Results   Component Value Date    ABO B 10/14/2017    RH Pos 10/14/2017    AS Neg 2017    HEPBANG Nonreactive 2017    CHPCRT  2017     Negative   Negative for C. trachomatis rRNA by transcription mediated amplification.   A negative result by transcription mediated amplification does not preclude the   presence of C. trachomatis infection because results are dependent on proper   and adequate collection, absence of inhibitors, and sufficient rRNA to be   detected.      GCPCRT  2017     Negative   Negative for N. gonorrhoeae rRNA by transcription mediated amplification.   A negative result by transcription mediated amplification does not preclude the   presence of N. gonorrhoeae infection because results are dependent on proper   and  adequate collection, absence of inhibitors, and sufficient rRNA to be   detected.      TREPAB Negative 07/13/2017    HGB 12.4 08/23/2017       GBS Status:   Lab Results   Component Value Date    GBS Negative 09/15/2017       Active Problem List  Patient Active Problem List   Diagnosis     Vitamin D deficiency     HSIL on Pap smear of cervix     Encounter for supervision of other normal pregnancy in third trimester     Nuchal cord, not applicable or unspecified fetus     Hyperglycemia     Back pain     Gastroesophageal reflux disease with esophagitis     Term pregnancy       Medication Prior to Admission  Prescriptions Prior to Admission   Medication Sig Dispense Refill Last Dose     omeprazole (PRILOSEC) 20 MG CR capsule Take 1 capsule (20 mg) by mouth daily 90 capsule 1 10/13/2017 at 0900     Prenatal Multivit-Min-Fe-FA (PRENATAL VITAMINS PO) Take 1 tablet by mouth daily   10/13/2017 at 0900     Cholecalciferol (VITAMIN D PO) Take 500 Units by mouth 2 times daily    10/13/2017 at 0900   .        Maternal Past Medical History:     Past Medical History:   Diagnosis Date     Anxiety      Depression with anxiety      Depressive disorder      HSIL on Pap smear of cervix 2/2/2016    colp scheduled 2/11/16     Low blood pressure      Vitamin D deficiency                        Family History:   This patient has no significant family history          Social History:   This patient has no significant social history  I have reviewed this patient's social history         Review of Systems:   C: NEGATIVE for fever, chills, change in weight  EYES: NEGATIVE for vision changes or irritation  R: NEGATIVE for significant cough or SOB  CV: NEGATIVE for chest pain, palpitations or peripheral edema  GI: NEGATIVE for abdominal pain         Physical Exam:   Vitals were reviewed  Temp: 98.2  F (36.8  C) Temp src: Oral BP: 118/57     Resp: 14 SpO2: 100 %        Constitutional:   awake, alert, cooperative, no apparent distress, and appears  stated age     Lungs:   No increased work of breathing, good air exchange, clear to auscultation bilaterally, no crackles or wheezing     Cardiovascular:   Normal apical impulse, regular rate and rhythm, normal S1 and S2, no S3 or S4, and no murmur noted     Abdomen:   Gravid, non-tender     Neurologic:   Awake, alert, oriented.  No clonus     Cervix:   Membranes: intact   Dilation: 2.5   Effacement: 50%   Station:-3   Consistency: soft   Position: Mid  Presentation:Cephalic  Fetal Heart Rate Tracing: reactive and reassuring  Tocometer: external monitor                        Assessment:   Valarie Talavera is a 39w3d pregnant female admitted for elective induction of labor. She is stable and was tolerating contractions well before epidural placement at 5 cm dilation.           Plan:   Labor induction with Pitocin. Continue epidural analgesia.  AROM when needed.  Anticipate NVD.    Vonda Voss     This patient was seen and examined by myself as well as the medical student.  The medical student has scribed the note and I have reviewed it, edited it appropriately and agree with the final documentation. Electronically signed by Luis Crisostomo MD

## 2017-10-14 NOTE — ANESTHESIA PREPROCEDURE EVALUATION
Anesthesia Evaluation     .      No history of anesthetic complications          ROS/MED HX    ENT/Pulmonary:  - neg pulmonary ROS     Neurologic:  - neg neurologic ROS     Cardiovascular:  - neg cardiovascular ROS       METS/Exercise Tolerance:     Hematologic:         Musculoskeletal:         GI/Hepatic:     (+) GERD       Renal/Genitourinary:         Endo:         Psychiatric:         Infectious Disease:         Malignancy:         Other:                     Physical Exam  Normal systems: cardiovascular, pulmonary and dental    Airway   Mallampati: II  TM distance: > 3 FB  Neck ROM: full  Mouth opening: > 3 cm    Dental     Cardiovascular       Pulmonary     Other findings: No H&P currently in chart      neg OB ROS                 Anesthesia Plan      History & Physical Review      ASA Status:  2 .  OB Epidural Asa: 2            Postoperative Care      Consents  Anesthetic plan, risks, benefits and alternatives discussed with:  Patient..                          .

## 2017-10-15 LAB — T PALLIDUM IGG+IGM SER QL: NEGATIVE

## 2017-10-15 PROCEDURE — 12000031 ZZH R&B OB CRITICAL

## 2017-10-15 PROCEDURE — 25000132 ZZH RX MED GY IP 250 OP 250 PS 637: Performed by: FAMILY MEDICINE

## 2017-10-15 PROCEDURE — 25000128 H RX IP 250 OP 636: Performed by: FAMILY MEDICINE

## 2017-10-15 PROCEDURE — 90707 MMR VACCINE SC: CPT | Performed by: FAMILY MEDICINE

## 2017-10-15 RX ADMIN — OXYCODONE HYDROCHLORIDE 5 MG: 5 TABLET ORAL at 13:41

## 2017-10-15 RX ADMIN — IBUPROFEN 800 MG: 400 TABLET ORAL at 13:41

## 2017-10-15 RX ADMIN — IBUPROFEN 800 MG: 400 TABLET ORAL at 20:32

## 2017-10-15 RX ADMIN — MEASLES, MUMPS, AND RUBELLA VIRUS VACCINE LIVE 0.5 ML: 1000; 12500; 1000 INJECTION, POWDER, LYOPHILIZED, FOR SUSPENSION SUBCUTANEOUS at 10:41

## 2017-10-15 RX ADMIN — IBUPROFEN 800 MG: 400 TABLET ORAL at 03:26

## 2017-10-15 RX ADMIN — OMEPRAZOLE 20 MG: 20 CAPSULE, DELAYED RELEASE ORAL at 20:32

## 2017-10-15 NOTE — ANESTHESIA POSTPROCEDURE EVALUATION
Patient: Valarie Talavera    * No procedures listed *    Diagnosis:Dr. Crisostomo  Term Labor  Epidural  Diagnosis Additional Information: No value filed.    Anesthesia Type:  No value filed.    Note:  Anesthesia Post Evaluation    Patient location during evaluation: Bedside  Patient participation: Able to fully participate in evaluation  Level of consciousness: awake  Pain management: adequate  Airway patency: patent  Cardiovascular status: acceptable and hemodynamically stable  Respiratory status: acceptable, room air and nonlabored ventilation  Hydration status: stable  PONV: none     Anesthetic complications: None    Comments:  Pt doing well, no anesthesia concerns noted/stated, full motor/sensory function lower extremities, pt satisfied with anesthesia care, pt aware to have Rn call anesthesia if any future questions/concerns come up.        Last vitals:  Vitals:    10/14/17 1830 10/14/17 1845 10/14/17 1900   BP: 114/67 115/69 107/71   Pulse: 81 80 85   Resp: 16 16 16   Temp:      SpO2:            Electronically Signed By: MARI Damon CRNA  October 14, 2017  10:50 PM

## 2017-10-15 NOTE — ANESTHESIA CARE TRANSFER NOTE
Patient: Valarie Talavera    * No procedures listed *    Diagnosis: Dr. Crisostomo  Term Labor  Epidural  Diagnosis Additional Information: No value filed.    Anesthesia Type:   No value filed.     Note:  Airway :Room Air  Patient transferred to:Labor and Delivery  Handoff Report: Identifed the Patient, Identified the Reponsible Provider, Reviewed the pertinent medical history, Discussed the surgical course, Reviewed Intra-OP anesthesia mangement and issues during anesthesia, Set expectations for post-procedure period and Allowed opportunity for questions and acknowledgement of understanding      Vitals: (Last set prior to Anesthesia Care Transfer)              Electronically Signed By: MARI Damon CRNA  October 14, 2017  10:50 PM

## 2017-10-15 NOTE — PROGRESS NOTES
S: Transfer to postpartum  B: Vaginal birth @ 1703, small tear, with repair, breast feeding    A: Mother and baby transferred to postpartum unit at 2030 via wheelchair after completion of immediate recovery period. Patient oriented to room. Mother and baby bonding well and in satisfactory condition upon transfer.  R: Anticipate routine postpartum care.

## 2017-10-15 NOTE — PLAN OF CARE
Problem: Patient Care Overview  Goal: Plan of Care/Patient Progress Review  Outcome: Improving  S-(situation): shift note     B-(background): ,  24 hrs     A-(assessment): stable and independent with all pp cares. Assisted as needed with . Crampy pain relieved with ibuprofen, 1 dose of oxycodone     R-(recommendations): cont routine pp cares. Plan DC for tomorrow

## 2017-10-15 NOTE — PLAN OF CARE
Problem: Postpartum (Vaginal Delivery) (Adult,Obstetrics,Pediatric)  Goal: Signs and Symptoms of Listed Potential Problems Will be Absent, Minimized or Managed (Postpartum)  Signs and symptoms of listed potential problems will be absent, minimized or managed by discharge/transition of care (reference Postpartum (Vaginal Delivery) (Adult,Obstetrics,Pediatric) CPG).   Outcome: Improving  Using po prn meds for backache in epidural spot. Independent with self and  cares. Requested to have a nipple shield for her right nipple. I gave her one but encouraged her to call me before using it so I can help her.

## 2017-10-15 NOTE — PROGRESS NOTES
Doing well.  Pain is well-controlled.  No fevers.  No history of foul-smelling vaginal discharge.  Good appetite.  Denies chest pain, shortness of breath, nausea or vomiting.  Vaginal bleeding is decreasing.  Ambulatory.  Breastfeeding well.    /57  Pulse 77  Temp 98.7  F (37.1  C) (Oral)  Resp 16  LMP 01/11/2017 (Exact Date)  SpO2 100%  Breastfeeding? Unknown  RRR, no MRG  CTAB  Abdomen benign, Fundus firm at U-1  No edema, No LE tenderness    Hemoglobin   Date Value Ref Range Status   08/23/2017 12.4 11.7 - 15.7 g/dL Final   ]    A/P:  27 year old [id number] on Pospartum day #1.  Doing well.  Continue normal postpartum care.  Anticipate D/C tomorrow.

## 2017-10-16 VITALS
DIASTOLIC BLOOD PRESSURE: 67 MMHG | SYSTOLIC BLOOD PRESSURE: 124 MMHG | TEMPERATURE: 98.9 F | OXYGEN SATURATION: 100 % | HEART RATE: 77 BPM | RESPIRATION RATE: 16 BRPM

## 2017-10-16 PROCEDURE — 25000132 ZZH RX MED GY IP 250 OP 250 PS 637: Performed by: FAMILY MEDICINE

## 2017-10-16 RX ORDER — LANOLIN 100 %
OINTMENT (GRAM) TOPICAL
COMMUNITY
Start: 2017-10-16 | End: 2019-01-29

## 2017-10-16 RX ORDER — CHOLECALCIFEROL (VITAMIN D3) 50 MCG
500 TABLET ORAL 2 TIMES DAILY
Qty: 60 TABLET | Refills: 3 | Status: SHIPPED | OUTPATIENT
Start: 2017-10-16 | End: 2019-03-15

## 2017-10-16 RX ORDER — IBUPROFEN 800 MG/1
800 TABLET, FILM COATED ORAL EVERY 8 HOURS PRN
Qty: 90 TABLET | Refills: 0 | Status: SHIPPED | OUTPATIENT
Start: 2017-10-16 | End: 2018-03-11

## 2017-10-16 RX ORDER — AMOXICILLIN 250 MG
1-2 CAPSULE ORAL 2 TIMES DAILY
Qty: 100 TABLET | COMMUNITY
Start: 2017-10-16 | End: 2018-03-11

## 2017-10-16 RX ADMIN — OXYCODONE HYDROCHLORIDE 5 MG: 5 TABLET ORAL at 03:02

## 2017-10-16 RX ADMIN — IBUPROFEN 800 MG: 400 TABLET ORAL at 03:02

## 2017-10-16 RX ADMIN — IBUPROFEN 800 MG: 400 TABLET ORAL at 08:48

## 2017-10-16 RX ADMIN — PRENATAL VIT W/ FE FUMARATE-FA TAB 27-0.8 MG 1 TABLET: 27-0.8 TAB at 08:48

## 2017-10-16 NOTE — DISCHARGE SUMMARY
[facility] Discharge Summary    Valarie Talavera MRN# [id number]   Age: 27 year old YOB: 1990     Date of Admission:  10/14/2017  Date of Discharge::  10/16/2017  Admitting Physician:  Luis Crisostomo MD  Discharge Physician:  Luis Crisostomo MD, MD     Home clinic: [facility]          Admission Diagnoses:   Term pregnancy          Discharge Diagnosis:   Induced vaginal delivery secondary to maternal request  Intrauterine pregnancy at 39 3/7 weeks gestation          Procedures:   Procedure(s): No additional procedures performed                  Medications Prior to Admission:     Prescriptions Prior to Admission   Medication Sig Dispense Refill Last Dose     omeprazole (PRILOSEC) 20 MG CR capsule Take 1 capsule (20 mg) by mouth daily 90 capsule 1 10/13/2017 at 0900     Prenatal Multivit-Min-Fe-FA (PRENATAL VITAMINS PO) Take 1 tablet by mouth daily   10/13/2017 at 0900             Discharge Medications:     Current Discharge Medication List      START taking these medications    Details   ibuprofen (ADVIL/MOTRIN) 800 MG tablet Take 1 tablet (800 mg) by mouth every 8 hours as needed for other (cramping)  Qty: 90 tablet, Refills: 0    Associated Diagnoses: NVD (normal vaginal delivery)      senna-docusate (SENOKOT-S;PERICOLACE) 8.6-50 MG per tablet Take 1-2 tablets by mouth 2 times daily  Qty: 100 tablet    Associated Diagnoses: NVD (normal vaginal delivery)      lanolin ointment Apply topically every hour as needed for dry skin (sore nipples)    Associated Diagnoses: NVD (normal vaginal delivery)         CONTINUE these medications which have CHANGED    Details   Cholecalciferol (VITAMIN D) 2000 UNITS tablet Take 500 Units by mouth 2 times daily  Qty: 60 tablet, Refills: 3    Associated Diagnoses: Breast feeding status of mother         CONTINUE these medications which have NOT CHANGED    Details   omeprazole (PRILOSEC) 20 MG CR capsule Take 1 capsule (20 mg) by mouth  daily  Qty: 90 capsule, Refills: 1    Associated Diagnoses: Encounter for supervision of other normal pregnancy in third trimester      Prenatal Multivit-Min-Fe-FA (PRENATAL VITAMINS PO) Take 1 tablet by mouth daily                   Consultations:   No consultations were requested during this admission          Brief History of Labor:   For additional information, see H & P and delivery summary.  Briefly, pt had requested elective induction of labor for prolonged prodromal labor and ultrasound visualization of nuchal cord.  She did well with pitocin induction and AROM.  There were some repetitive variable decelerations near full dilation, and amnioinfusion was considered, but then dilation was nearly complete, so decision was made to push.  Pt rapidly delivered a healthy male infant over an intact perineum with Apgars of 9,10.  There were no complications.           Hospital Course:   The patient's hospital course was unremarkable.  On discharge, her pain was well controlled. Vaginal bleeding is similar to peak menstrual flow.  Voiding without difficulty.  Ambulating well and tolerating a normal diet.  No fever.  Breastfeeding fairly well.  Infant is stable.  No bowel movement yet.  She was discharged on post-partum day #2.    Post-partum hemoglobin:   Hemoglobin   Date Value Ref Range Status   08/23/2017 12.4 11.7 - 15.7 g/dL Final             Discharge Instructions and Follow-Up:   Discharge diet: Regular   Discharge activity: Pelvic rest: abstain from intercourse and do not use tampons for 6 week(s)   Discharge follow-up: Follow up with primary care provider in 6 weeks   Wound care: Drink plenty of fluids  Ice to area for comfort           Discharge Disposition:   Discharged to home      Attestation:  I have reviewed today's vital signs, notes, medications, labs and imaging.  Amount of time performed on this discharge summary: 20 minutes.    Luis Crisostomo MD, MD

## 2017-10-16 NOTE — PROGRESS NOTES
S-(situation): Discharge  to home with infant    B-(background): Patient had a Vaginal delivery with no complications. Baby boy Baby's name Roland, both breast and bottle(Similac Advance)}: per parental request. Patient is also breast feeding and pumping to get milk supply in. Did take home any pumped breast milk that she had Support person's name Chas.     A-(assessment): VSS. Pain controlled well with ibuprofen. independent with her and babies cares. Declines the need for any further teaching. Breast feeding well. Is also pumping and comfortable using the breast pump.     R-(recommendations): 1046 Discharge instructions reviewed and questions answered.  Belongings gathered and returned to the patient. Agreed to follow up in 6 weeks or sooner with any question or concerns.     Nursing Discharge Checklist:    Pneumovax screened and given, if appropriate: N/A  Influenza vaccine screened and given, if appropriate: N/A  Staples removed (): N/A  Breast milk returned: YES  Hydrogel pads sent home:N/A  Birth Certificate Done: YES

## 2017-10-19 NOTE — PROGRESS NOTES
Valarie Talavera  Gender: female  : 1990  [address] [facility] [address]  [phone] (home)   Medical Record: [id number]  Primary Care Provider: Luis Crisostomo       [facility]   ?   Discharge Phone Call: Key Words/Key Times     How are you and the baby?     How are feedings going?     Voiding & Stooling?     Any questions or concerns?     Follow-up appointment?       We want to provide excellent care here at [facility]. Do you have any feedback for us that would help us improve?     Call back COMMENTS:         Attempted Calls:   ___10/19/17 1133 first attempt per SHELLI Kamara RN______     __________  10/20/17 1128 second attempt no answer per SHELLI Kamara RN

## 2017-10-25 ENCOUNTER — APPOINTMENT (OUTPATIENT)
Dept: ULTRASOUND IMAGING | Facility: CLINIC | Age: 27
End: 2017-10-25
Attending: PHYSICIAN ASSISTANT
Payer: COMMERCIAL

## 2017-10-25 ENCOUNTER — HOSPITAL ENCOUNTER (EMERGENCY)
Facility: CLINIC | Age: 27
Discharge: HOME | End: 2017-10-25
Attending: PHYSICIAN ASSISTANT | Admitting: PHYSICIAN ASSISTANT
Payer: COMMERCIAL

## 2017-10-25 ENCOUNTER — APPOINTMENT (OUTPATIENT)
Dept: MRI IMAGING | Facility: CLINIC | Age: 27
End: 2017-10-25
Attending: PHYSICIAN ASSISTANT
Payer: COMMERCIAL

## 2017-10-25 ENCOUNTER — TELEPHONE (OUTPATIENT)
Dept: FAMILY MEDICINE | Facility: OTHER | Age: 27
End: 2017-10-25

## 2017-10-25 VITALS
OXYGEN SATURATION: 97 % | SYSTOLIC BLOOD PRESSURE: 108 MMHG | TEMPERATURE: 99.9 F | RESPIRATION RATE: 16 BRPM | DIASTOLIC BLOOD PRESSURE: 64 MMHG | WEIGHT: 240.2 LBS | HEART RATE: 96 BPM | BODY MASS INDEX: 38.15 KG/M2

## 2017-10-25 DIAGNOSIS — R10.2 ADNEXAL TENDERNESS, RIGHT: ICD-10-CM

## 2017-10-25 DIAGNOSIS — M54.50 LOW BACK PAIN WITHOUT SCIATICA, UNSPECIFIED BACK PAIN LATERALITY, UNSPECIFIED CHRONICITY: ICD-10-CM

## 2017-10-25 LAB
ALBUMIN SERPL-MCNC: 3.3 G/DL (ref 3.4–5)
ALBUMIN UR-MCNC: NEGATIVE MG/DL
ALP SERPL-CCNC: 125 U/L (ref 40–150)
ALT SERPL W P-5'-P-CCNC: 28 U/L (ref 0–50)
ANION GAP SERPL CALCULATED.3IONS-SCNC: 10 MMOL/L (ref 3–14)
APPEARANCE UR: CLEAR
AST SERPL W P-5'-P-CCNC: 13 U/L (ref 0–45)
BASOPHILS # BLD AUTO: 0 10E9/L (ref 0–0.2)
BASOPHILS NFR BLD AUTO: 0.1 %
BILIRUB SERPL-MCNC: 0.3 MG/DL (ref 0.2–1.3)
BILIRUB UR QL STRIP: NEGATIVE
BUN SERPL-MCNC: 20 MG/DL (ref 7–30)
CALCIUM SERPL-MCNC: 9.9 MG/DL (ref 8.5–10.1)
CHLORIDE SERPL-SCNC: 105 MMOL/L (ref 94–109)
CO2 SERPL-SCNC: 25 MMOL/L (ref 20–32)
COLOR UR AUTO: YELLOW
CREAT SERPL-MCNC: 0.75 MG/DL (ref 0.52–1.04)
CRP SERPL-MCNC: 39.6 MG/L (ref 0–8)
DIFFERENTIAL METHOD BLD: NORMAL
EOSINOPHIL # BLD AUTO: 0.1 10E9/L (ref 0–0.7)
EOSINOPHIL NFR BLD AUTO: 1.2 %
ERYTHROCYTE [DISTWIDTH] IN BLOOD BY AUTOMATED COUNT: 14.9 % (ref 10–15)
ERYTHROCYTE [SEDIMENTATION RATE] IN BLOOD BY WESTERGREN METHOD: 19 MM/H (ref 0–20)
GFR SERPL CREATININE-BSD FRML MDRD: >90 ML/MIN/1.7M2
GLUCOSE SERPL-MCNC: 78 MG/DL (ref 70–99)
GLUCOSE UR STRIP-MCNC: NEGATIVE MG/DL
HCT VFR BLD AUTO: 43.4 % (ref 35–47)
HGB BLD-MCNC: 13.8 G/DL (ref 11.7–15.7)
HGB UR QL STRIP: ABNORMAL
IMM GRANULOCYTES # BLD: 0 10E9/L (ref 0–0.4)
IMM GRANULOCYTES NFR BLD: 0.1 %
KETONES UR STRIP-MCNC: NEGATIVE MG/DL
LACTATE BLD-SCNC: 0.8 MMOL/L (ref 0.7–2)
LEUKOCYTE ESTERASE UR QL STRIP: NEGATIVE
LYMPHOCYTES # BLD AUTO: 1.3 10E9/L (ref 0.8–5.3)
LYMPHOCYTES NFR BLD AUTO: 19 %
MCH RBC QN AUTO: 27.2 PG (ref 26.5–33)
MCHC RBC AUTO-ENTMCNC: 31.8 G/DL (ref 31.5–36.5)
MCV RBC AUTO: 86 FL (ref 78–100)
MONOCYTES # BLD AUTO: 0.8 10E9/L (ref 0–1.3)
MONOCYTES NFR BLD AUTO: 11.3 %
MUCOUS THREADS #/AREA URNS LPF: PRESENT /LPF
NEUTROPHILS # BLD AUTO: 4.6 10E9/L (ref 1.6–8.3)
NEUTROPHILS NFR BLD AUTO: 68.3 %
NITRATE UR QL: NEGATIVE
PH UR STRIP: 5 PH (ref 5–7)
PLATELET # BLD AUTO: 304 10E9/L (ref 150–450)
POTASSIUM SERPL-SCNC: 3.9 MMOL/L (ref 3.4–5.3)
PROT SERPL-MCNC: 7.6 G/DL (ref 6.8–8.8)
RBC # BLD AUTO: 5.07 10E12/L (ref 3.8–5.2)
RBC #/AREA URNS AUTO: 2 /HPF (ref 0–2)
SODIUM SERPL-SCNC: 140 MMOL/L (ref 133–144)
SOURCE: ABNORMAL
SP GR UR STRIP: 1.02 (ref 1–1.03)
SQUAMOUS #/AREA URNS AUTO: <1 /HPF (ref 0–1)
UROBILINOGEN UR STRIP-MCNC: 0 MG/DL (ref 0–2)
WBC # BLD AUTO: 6.7 10E9/L (ref 4–11)
WBC #/AREA URNS AUTO: 1 /HPF (ref 0–2)

## 2017-10-25 PROCEDURE — 96365 THER/PROPH/DIAG IV INF INIT: CPT | Performed by: PHYSICIAN ASSISTANT

## 2017-10-25 PROCEDURE — 25000128 H RX IP 250 OP 636: Performed by: PHYSICIAN ASSISTANT

## 2017-10-25 PROCEDURE — 96376 TX/PRO/DX INJ SAME DRUG ADON: CPT | Mod: 59 | Performed by: PHYSICIAN ASSISTANT

## 2017-10-25 PROCEDURE — 25000132 ZZH RX MED GY IP 250 OP 250 PS 637: Performed by: PHYSICIAN ASSISTANT

## 2017-10-25 PROCEDURE — A9585 GADOBUTROL INJECTION: HCPCS | Performed by: RADIOLOGY

## 2017-10-25 PROCEDURE — 25000128 H RX IP 250 OP 636: Performed by: RADIOLOGY

## 2017-10-25 PROCEDURE — 96375 TX/PRO/DX INJ NEW DRUG ADDON: CPT | Mod: 59 | Performed by: PHYSICIAN ASSISTANT

## 2017-10-25 PROCEDURE — 96361 HYDRATE IV INFUSION ADD-ON: CPT | Mod: 59 | Performed by: PHYSICIAN ASSISTANT

## 2017-10-25 PROCEDURE — 85652 RBC SED RATE AUTOMATED: CPT | Performed by: PHYSICIAN ASSISTANT

## 2017-10-25 PROCEDURE — 76830 TRANSVAGINAL US NON-OB: CPT

## 2017-10-25 PROCEDURE — 81001 URINALYSIS AUTO W/SCOPE: CPT | Performed by: PHYSICIAN ASSISTANT

## 2017-10-25 PROCEDURE — 72158 MRI LUMBAR SPINE W/O & W/DYE: CPT

## 2017-10-25 PROCEDURE — 86140 C-REACTIVE PROTEIN: CPT | Performed by: PHYSICIAN ASSISTANT

## 2017-10-25 PROCEDURE — 83605 ASSAY OF LACTIC ACID: CPT | Performed by: PHYSICIAN ASSISTANT

## 2017-10-25 PROCEDURE — 80053 COMPREHEN METABOLIC PANEL: CPT | Performed by: PHYSICIAN ASSISTANT

## 2017-10-25 PROCEDURE — 85025 COMPLETE CBC W/AUTO DIFF WBC: CPT | Performed by: PHYSICIAN ASSISTANT

## 2017-10-25 PROCEDURE — 99285 EMERGENCY DEPT VISIT HI MDM: CPT | Mod: Z6 | Performed by: PHYSICIAN ASSISTANT

## 2017-10-25 PROCEDURE — 99285 EMERGENCY DEPT VISIT HI MDM: CPT | Mod: 25 | Performed by: PHYSICIAN ASSISTANT

## 2017-10-25 PROCEDURE — 87040 BLOOD CULTURE FOR BACTERIA: CPT | Mod: 91 | Performed by: PHYSICIAN ASSISTANT

## 2017-10-25 RX ORDER — METRONIDAZOLE 500 MG/1
500 TABLET ORAL 3 TIMES DAILY
Qty: 30 TABLET | Refills: 0 | Status: SHIPPED | OUTPATIENT
Start: 2017-10-25 | End: 2017-11-04

## 2017-10-25 RX ORDER — KETOROLAC TROMETHAMINE 30 MG/ML
30 INJECTION, SOLUTION INTRAMUSCULAR; INTRAVENOUS ONCE
Status: COMPLETED | OUTPATIENT
Start: 2017-10-25 | End: 2017-10-25

## 2017-10-25 RX ORDER — SODIUM CHLORIDE 9 MG/ML
1000 INJECTION, SOLUTION INTRAVENOUS CONTINUOUS
Status: DISCONTINUED | OUTPATIENT
Start: 2017-10-25 | End: 2017-10-26 | Stop reason: HOSPADM

## 2017-10-25 RX ORDER — AMPICILLIN AND SULBACTAM 2; 1 G/1; G/1
3 INJECTION, POWDER, FOR SOLUTION INTRAMUSCULAR; INTRAVENOUS ONCE
Status: COMPLETED | OUTPATIENT
Start: 2017-10-25 | End: 2017-10-25

## 2017-10-25 RX ORDER — OXYCODONE HYDROCHLORIDE 5 MG/1
5 TABLET ORAL EVERY 6 HOURS PRN
Qty: 10 TABLET | Refills: 0 | Status: SHIPPED | OUTPATIENT
Start: 2017-10-25 | End: 2018-03-11

## 2017-10-25 RX ORDER — HYDROMORPHONE HYDROCHLORIDE 1 MG/ML
0.5 INJECTION, SOLUTION INTRAMUSCULAR; INTRAVENOUS; SUBCUTANEOUS
Status: DISCONTINUED | OUTPATIENT
Start: 2017-10-25 | End: 2017-10-26 | Stop reason: HOSPADM

## 2017-10-25 RX ORDER — GADOBUTROL 604.72 MG/ML
7.5 INJECTION INTRAVENOUS ONCE
Status: COMPLETED | OUTPATIENT
Start: 2017-10-25 | End: 2017-10-25

## 2017-10-25 RX ORDER — ACETAMINOPHEN 325 MG/1
975 TABLET ORAL ONCE
Status: COMPLETED | OUTPATIENT
Start: 2017-10-25 | End: 2017-10-25

## 2017-10-25 RX ADMIN — GADOBUTROL 7.5 ML: 604.72 INJECTION INTRAVENOUS at 20:16

## 2017-10-25 RX ADMIN — HYDROMORPHONE HYDROCHLORIDE 0.5 MG: 1 INJECTION, SOLUTION INTRAMUSCULAR; INTRAVENOUS; SUBCUTANEOUS at 19:39

## 2017-10-25 RX ADMIN — AMPICILLIN SODIUM AND SULBACTAM SODIUM 3 G: 2; 1 INJECTION, POWDER, FOR SOLUTION INTRAMUSCULAR; INTRAVENOUS at 21:40

## 2017-10-25 RX ADMIN — KETOROLAC TROMETHAMINE 30 MG: 30 INJECTION, SOLUTION INTRAMUSCULAR at 18:57

## 2017-10-25 RX ADMIN — SODIUM CHLORIDE 1000 ML: 9 INJECTION, SOLUTION INTRAVENOUS at 18:44

## 2017-10-25 RX ADMIN — GADOBUTROL 3.5 ML: 604.72 INJECTION INTRAVENOUS at 20:17

## 2017-10-25 RX ADMIN — HYDROMORPHONE HYDROCHLORIDE 0.5 MG: 1 INJECTION, SOLUTION INTRAMUSCULAR; INTRAVENOUS; SUBCUTANEOUS at 22:44

## 2017-10-25 RX ADMIN — ACETAMINOPHEN 975 MG: 325 TABLET ORAL at 18:31

## 2017-10-25 ASSESSMENT — ENCOUNTER SYMPTOMS
HEADACHES: 1
FEVER: 1
CHILLS: 1
SORE THROAT: 0
NECK PAIN: 1
COUGH: 0
MYALGIAS: 1
DIAPHORESIS: 1
BACK PAIN: 1

## 2017-10-25 NOTE — ED PROVIDER NOTES
History     Chief Complaint   Patient presents with     Fever     Vaginal Bleeding     The history is provided by the patient.     Valarie Talavera is a 27 year old female who presents to the emergency department with her dad, with concerns of vaginal bleeding and a fever. She delivered her baby 10 days ago and had an epidural during the delivery process. She did not have any headaches until Sunday, when everything from the nose up started to hurt. She then got diaphoretic and had flashes of hot and cold. She laid down earlier today and noticed body aches that she did not have before. She did not begin to feel feverish until approximately 2 hours ago. The patient has been taking ibuprofen for the headaches with no relief. She reports that her vaginal bleeding has been light and she has been changing her pads typically three times a day. Tonight when she changed it just before coming into the emergency department, the bleeding had increased and was much brighter red than before. She endorses that she is sore from the neck down to the tale bone. Her hips are sore as well. She has some chest discomfort when laying down. She denies having any skin rashes, a cough and sore throat.     Problem List:    Patient Active Problem List    Diagnosis Date Noted     Term pregnancy 10/14/2017     Priority: Medium     Gastroesophageal reflux disease with esophagitis 10/04/2017     Priority: Medium     Back pain 08/23/2017     Priority: Medium     Hyperglycemia 07/13/2017     Priority: Medium     Nuchal cord, not applicable or unspecified fetus 06/23/2017     Priority: Medium     Encounter for supervision of other normal pregnancy in third trimester 03/11/2017     Priority: Medium     Vitamin D deficiency 02/02/2016     Priority: Medium     HSIL on Pap smear of cervix 02/02/2016     Priority: Medium     2/2/16: Pap - HSIL. Plan colp  2/11/16 [place].  HSIL. Age 25  Plan: per tele enc on 2/18/16 [place] and cytology in August.  Will have  pathologist differentiate between WESLY 2 and 3 if possible.  If lesion is worse, then LEEP.  If it is the same or better, then colp and cytology in 6 months.    8/30/16 HSIL pap/+ HR HPV (not 16 or 18). [facility] not completed.  Per Dr. Luna, have pt. Schedule colp bef 11/30/16.  9/9/16 Pt. Advised.  9/22/16 [facility]. NO SHOW.  10/6/16 [facility] visit. NO SHOW.  12/8/16 Reminder letter.   1/11/17 [facility] not done. Tracking updated for 6 mo colp/pap.   3/10/17 ASCUS pap/+ HR HPV (not 16 or 18). approx 9 weeks pregnant. Plan: colp around 20 weeks gestation, approx 5/26/17  10/14/17 delivered baby. (6 wk pp due 11/25/17)          Past Medical History:    Past Medical History:   Diagnosis Date     Anxiety      Depression with anxiety      Depressive disorder      HSIL on Pap smear of cervix 2/2/2016     Low blood pressure      Vitamin D deficiency        Past Surgical History:    Past Surgical History:   Procedure Laterality Date     NO HISTORY OF SURGERY         Family History:    Family History   Problem Relation Age of Onset     Hypertension No family hx of      CEREBROVASCULAR DISEASE No family hx of      Hyperlipidemia No family hx of        Social History:  Marital Status:  Single [1]  Social History   Substance Use Topics     Smoking status: Former Smoker     Types: Cigarettes     Quit date: 2/1/2017     Smokeless tobacco: Never Used     Alcohol use No      Comment: Occasionally        Medications:      amoxicillin-clavulanate (AUGMENTIN) 875-125 MG per tablet   metroNIDAZOLE (FLAGYL) 500 MG tablet   oxyCODONE (ROXICODONE) 5 MG IR tablet   ibuprofen (ADVIL/MOTRIN) 800 MG tablet   senna-docusate (SENOKOT-S;PERICOLACE) 8.6-50 MG per tablet   Cholecalciferol (VITAMIN D) 2000 UNITS tablet   omeprazole (PRILOSEC) 20 MG CR capsule   Prenatal Multivit-Min-Fe-FA (PRENATAL VITAMINS PO)   lanolin ointment       Review of Systems   Constitutional: Positive for chills, diaphoresis and fever.   HENT: Negative for sore throat.    Respiratory:  Negative for cough.    Genitourinary: Positive for vaginal bleeding.   Musculoskeletal: Positive for back pain, myalgias and neck pain.        Positive for hip pain   Skin: Negative for rash.   Neurological: Positive for headaches.   All other systems reviewed and are negative.      Physical Exam   BP: 141/80  Pulse: 105  Temp: 100.7  F (38.2  C)  Resp: 16  Weight: 109 kg (240 lb 3.2 oz)  SpO2: 98 %      Physical Exam   Constitutional: She is oriented to person, place, and time. She appears well-developed and well-nourished. She appears distressed (secondary to pain).   HENT:   Head: Normocephalic and atraumatic.   Right Ear: External ear normal.   Left Ear: External ear normal.   Nose: Nose normal.   Mouth/Throat: Oropharynx is clear and moist. No oropharyngeal exudate.   Eyes: Conjunctivae and EOM are normal. Pupils are equal, round, and reactive to light. Right eye exhibits no discharge. Left eye exhibits no discharge. No scleral icterus.   Neck: Normal range of motion. Neck supple. Muscular tenderness present.   Pain with ROM.  Negative Kernig's and Brudzinski's sign. She is tender to palpation over the spinous processes of the cervical, thoracic, and lumbar spine. Most notably over the lumbar spine. No mass palpated. No erythema, induration, or fluctuance.   Cardiovascular: Normal rate, regular rhythm, normal heart sounds and intact distal pulses.    No murmur heard.  Pulmonary/Chest: Effort normal and breath sounds normal. No respiratory distress. She has no wheezes. She has no rales. She exhibits no tenderness.   Abdominal: Soft. Bowel sounds are normal. She exhibits no distension and no mass. There is tenderness in the right lower quadrant and left lower quadrant. There is no rebound and no guarding.   Musculoskeletal: Normal range of motion. She exhibits no edema, tenderness or deformity.   Lymphadenopathy:     She has no cervical adenopathy.   Neurological: She is alert and oriented to person, place, and  time.   Skin: Skin is warm and dry. No rash noted. No erythema.   Psychiatric: She has a normal mood and affect. Her behavior is normal.   Nursing note and vitals reviewed.      ED Course     ED Course     Procedures               Critical Care time:  none         Results for orders placed or performed during the hospital encounter of 10/25/17   MR Lumbar Spine w/o & w Contrast    Narrative    MR LUMBAR SPINE WITHOUT AND WITH CONTRAST 10/25/2017 8:34 PM    HISTORY:  Low back pain, fever, history of obstetrical epidural 10  days prior, concern for epidural abscess.    COMPARISON: None.    TECHNIQUE: MR lumbar spine without and with 11 mL Gadavist.    FINDINGS: Conus lies at T12 and appears normal. There is no evidence  for epidural abscess. No pathologic enhancement with gadolinium. No  evidence for osteomyelitis.    Alignment is normal through the sacrum. Upper lumbar spine is negative  through L3-4.    L4-5: Mild dehydration of disc material and degenerative narrowing of  the interspace. Moderate broad-based central disc protrusion with mild  central stenosis. No significant foraminal stenosis.    L5-S1: Dehydration of the disc material. Small broad-based central  disc protrusion. Minimal central stenosis. No significant foraminal  stenosis.      Impression    IMPRESSION:  1. Degenerative disc disease with central disc protrusions at L4-5 and  lumbosacral levels.  2. No evidence for epidural abscess. No pathologic enhancement with  gadolinium.    ELLEN HAYDEN MD   US Pelvic Complete w Transvaginal    Narrative    ULTRASOUND PELVIS WITH TRANSVAGINAL IMAGING   10/25/2017 9:43 PM     HISTORY: Gave birth on 10/14/2017. Pelvic pain.    COMPARISON: 9/19/2017.    TECHNIQUE: Endovaginal imaging was also performed to better evaluate  the uterus.    FINDINGS: The uterus is anteflexed, measuring 12.7 x 8.8 x 10.6 cm in  long axis, short axis and transverse dimensions respectively. No  myometrial masses. The endometrial  stripe is heterogeneous, measuring  3.5 cm in thickness. Blood flow is visualized within portions of the  thickened endometrial stripe. Fluid is also present in the endometrial  cavity. The right ovary is unremarkable, measuring 4.4 x 3.6 x 1.2 cm.  The left ovary is not visualized. No adnexal masses. No free fluid in  the pelvis.      Impression    IMPRESSION:   1. The endometrial stripe is heterogeneous and thickened and has  portions containing blood flow. These findings are suspicious for  retained products of conception.  2. Unremarkable appearance of the right ovary. Left ovary is not  visualized.  3. No free fluid in the pelvis.   CBC with platelets differential   Result Value Ref Range    WBC 6.7 4.0 - 11.0 10e9/L    RBC Count 5.07 3.8 - 5.2 10e12/L    Hemoglobin 13.8 11.7 - 15.7 g/dL    Hematocrit 43.4 35.0 - 47.0 %    MCV 86 78 - 100 fl    MCH 27.2 26.5 - 33.0 pg    MCHC 31.8 31.5 - 36.5 g/dL    RDW 14.9 10.0 - 15.0 %    Platelet Count 304 150 - 450 10e9/L    Diff Method Automated Method     % Neutrophils 68.3 %    % Lymphocytes 19.0 %    % Monocytes 11.3 %    % Eosinophils 1.2 %    % Basophils 0.1 %    % Immature Granulocytes 0.1 %    Absolute Neutrophil 4.6 1.6 - 8.3 10e9/L    Absolute Lymphocytes 1.3 0.8 - 5.3 10e9/L    Absolute Monocytes 0.8 0.0 - 1.3 10e9/L    Absolute Eosinophils 0.1 0.0 - 0.7 10e9/L    Absolute Basophils 0.0 0.0 - 0.2 10e9/L    Abs Immature Granulocytes 0.0 0 - 0.4 10e9/L   Comprehensive metabolic panel   Result Value Ref Range    Sodium 140 133 - 144 mmol/L    Potassium 3.9 3.4 - 5.3 mmol/L    Chloride 105 94 - 109 mmol/L    Carbon Dioxide 25 20 - 32 mmol/L    Anion Gap 10 3 - 14 mmol/L    Glucose 78 70 - 99 mg/dL    Urea Nitrogen 20 7 - 30 mg/dL    Creatinine 0.75 0.52 - 1.04 mg/dL    GFR Estimate >90 >60 mL/min/1.7m2    GFR Estimate If Black >90 >60 mL/min/1.7m2    Calcium 9.9 8.5 - 10.1 mg/dL    Bilirubin Total 0.3 0.2 - 1.3 mg/dL    Albumin 3.3 (L) 3.4 - 5.0 g/dL    Protein  Total 7.6 6.8 - 8.8 g/dL    Alkaline Phosphatase 125 40 - 150 U/L    ALT 28 0 - 50 U/L    AST 13 0 - 45 U/L   Lactic acid whole blood   Result Value Ref Range    Lactic Acid 0.8 0.7 - 2.0 mmol/L   CRP inflammation   Result Value Ref Range    CRP Inflammation 39.6 (H) 0.0 - 8.0 mg/L   UA reflex to Microscopic and Culture   Result Value Ref Range    Color Urine Yellow     Appearance Urine Clear     Glucose Urine Negative NEG^Negative mg/dL    Bilirubin Urine Negative NEG^Negative    Ketones Urine Negative NEG^Negative mg/dL    Specific Gravity Urine 1.023 1.003 - 1.035    Blood Urine Moderate (A) NEG^Negative    pH Urine 5.0 5.0 - 7.0 pH    Protein Albumin Urine Negative NEG^Negative mg/dL    Urobilinogen mg/dL 0.0 0.0 - 2.0 mg/dL    Nitrite Urine Negative NEG^Negative    Leukocyte Esterase Urine Negative NEG^Negative    Source Unspecified Urine     RBC Urine 2 0 - 2 /HPF    WBC Urine 1 0 - 2 /HPF    Squamous Epithelial /HPF Urine <1 0 - 1 /HPF    Mucous Urine Present (A) NEG^Negative /LPF   Erythrocyte sedimentation rate auto   Result Value Ref Range    Sed Rate 19 0 - 20 mm/h        Medications   0.9% sodium chloride BOLUS (0 mLs Intravenous Stopped 10/25/17 1922)     Followed by   0.9% sodium chloride infusion (not administered)   HYDROmorphone (PF) (DILAUDID) injection 0.5 mg (0.5 mg Intravenous Given 10/25/17 2244)   acetaminophen (TYLENOL) tablet 975 mg (975 mg Oral Given 10/25/17 1831)   ketorolac (TORADOL) injection 30 mg (30 mg Intravenous Given 10/25/17 1857)   gadobutrol (GADAVIST) injection 7.5 mL (3.5 mLs Intravenous Given 10/25/17 2017)   gadobutrol (GADAVIST) injection 7.5 mL (7.5 mLs Intravenous Given 10/25/17 2016)   sodium chloride (PF) 0.9% PF flush 3 mL (3 mLs Intravenous Given 10/25/17 2018)   ampicillin-sulbactam (UNASYN) 3 g vial to attach to  mL bag (0 g Intravenous Stopped 10/25/17 2240)     Assessments & Plan (with Medical Decision Making)  Postpartum endometritis     27 year old female  presents for evaluation of fevers, chills, myalgias, arthralgias, spinal discomfort, headache, and increased vaginal bleeding for the past 2 days. Vaginal bleeding just increase this afternoon. She is 10 days postpartum from a normal vaginal delivery. She states that her postpartum course has gone relatively well until 2 days from now. She denies any breast discomfort, breast erythema, breast mass, dysuria, frequency, urgency, flank pain, or URI symptoms. On exam the patient is febrile with a temperature of 100.9. Blood pressure 108/64, pulse 96, and oxygen saturations of 97%. Patient does appear to be ill and in distress secondary to discomfort as noted above in the exam section. She has significant spinous process tenderness throughout her spine, most notably in the lumbar spine. Suprapubic abdominal discomfort without rebound or guarding. Remainder of the exam was normal. Laboratory evaluation displays a normal urinalysis, normal CMP, and normal CBC. CRP elevated at 39, but ESR was normal at 19.  Given our concern for epidural abscess being on the differential, lumbar MRI was performed. This displayed L4/5 degenerative disc disease but no acute infectious process. Pelvic ultrasound showed a 3 cm thickened endometrial lining. Enlarged uterus. I discussed these findings with the  on call M.PORSHA., Dr. Lopez.  He looked at the ultrasound images and determined that the endometrial findings were likely old blood in the uterine lining. She did not feel that these were significant retained products of conception, and she has not had significant bleeding to this point. Therefore, he did not feel D&C was indicated currently. Recommendation for IV Unasyn here in the ED and discharged home on Augmentin and metronidazole as an outpatient. Treatment for endometritis. Blood cultures performed and still pending. Patient did feel improved in the ED after administration of p.o. Tylenol, IV Toradol, and IV Dilaudid. She was  also given 1 L of IV normal saline for rehydration. She was sent home with p.o. oxycodone for breakthrough discomfort. Her PCP did not have a follow-up appointment available for 1.5 days from now, so we did send a work in message.  I sent a staff message to Dr. Crisostomo regarding the patient's evaluation this evening.   Indications for return to the ED prior to then discussed with the patient in detail. She is in agreement with this plan and was suitable for discharge. Her father was present to drive her home.      I have reviewed the nursing notes.    I have reviewed the findings, diagnosis, plan and need for follow up with the patient.       Discharge Medication List as of 10/25/2017 10:56 PM      START taking these medications    Details   amoxicillin-clavulanate (AUGMENTIN) 875-125 MG per tablet Take 1 tablet by mouth 2 times daily for 10 days, Disp-20 tablet, R-0, E-Prescribe      metroNIDAZOLE (FLAGYL) 500 MG tablet Take 1 tablet (500 mg) by mouth 3 times daily for 10 days, Disp-30 tablet, R-0, E-PrescribeEat yogurt or cottage cheese daily to prevent diarrhea caused by taking this antibiotic      oxyCODONE (ROXICODONE) 5 MG IR tablet Take 1 tablet (5 mg) by mouth every 6 hours as needed for pain, Disp-10 tablet, R-0, Local Print             Final diagnoses:   Postpartum endometritis     This document serves as a record of services personally performed by Pérez Martinez PA-C. It was created on their behalf by Ashley Blanco, a trained medical scribe. The creation of this record is based on the provider's personal observations and the statements of the patient. This document has been checked and approved by the attending provider.    Note: Chart documentation done in part with Dragon Voice Recognition software. Although reviewed after completion, some word and grammatical errors may remain.    10/25/2017   Pérez Martinez PA-C   [facility] EMERGENCY DEPARTMENT     Pérez Martinez PA-C  10/25/17  [id number]

## 2017-10-25 NOTE — ED AVS SNAPSHOT
[facility] Emergency Department    [address] [place] [address]    Phone:  [phone]    Fax:  [phone]                                       Valarie Talavera   MRN: [id number]    Department:  [facility] Emergency Department   Date of Visit:  10/25/2017           Patient Information     Date Of Birth          1990        Your diagnoses for this visit were:     Postpartum endometritis        You were seen by Pérez Martinez PA-C.      Follow-up Information     Follow up with [facility] Emergency Department.    Specialty:  EMERGENCY MEDICINE    Why:  As needed, If symptoms worsen    Contact information:    [address] [place] [address] [phone]    Additional information:    From [address]: Exit at [place] on south side of [place]. Turn right on [address]. Turn left at stoplight on [address]. [facility] will be in view two blocks ahead        Discharge Instructions       1.  Please start the Augmentin and Metronidazole ( antibiotics ) tomorrow morning.    2.  Please take Ibuprofen 600 mg every 6 hours as needed for fevers or discomfort.  Take Tylenol 1,000 mg every 6 hours as needed if the Ibuprofen is not working.    3.  We sent Dr. Crisostomo a work in message for a recheck appointment on Friday.  His nurse should be calling you with options.        24 Hour Appointment Hotline       To make an appointment at any [place] clinic, call [phone] ([phone]). If you don't have a family doctor or clinic, we will help you find one. [facility] clinics are conveniently located to serve the needs of you and your family.             Review of your medicines      START taking        Dose / Directions Last dose taken    amoxicillin-clavulanate 875-125 MG per tablet   Commonly known as:  AUGMENTIN   Dose:  1 tablet   Quantity:  20 tablet        Take 1 tablet by mouth 2 times daily for 10 days   Refills:  0         metroNIDAZOLE 500 MG tablet   Commonly known as:  FLAGYL   Dose:  500 mg   Quantity:  30 tablet        Take 1 tablet (500 mg) by mouth 3 times daily for 10 days   Refills:  0        oxyCODONE 5 MG IR tablet   Commonly known as:  ROXICODONE   Dose:  5 mg   Quantity:  10 tablet        Take 1 tablet (5 mg) by mouth every 6 hours as needed for pain   Refills:  0          Our records show that you are taking the medicines listed below. If these are incorrect, please call your family doctor or clinic.        Dose / Directions Last dose taken    ibuprofen 800 MG tablet   Commonly known as:  ADVIL/MOTRIN   Dose:  800 mg   Quantity:  90 tablet        Take 1 tablet (800 mg) by mouth every 8 hours as needed for other (cramping)   Refills:  0        lanolin ointment        Apply topically every hour as needed for dry skin (sore nipples)   Refills:  0        omeprazole 20 MG CR capsule   Commonly known as:  priLOSEC   Dose:  20 mg   Quantity:  90 capsule        Take 1 capsule (20 mg) by mouth daily   Refills:  1        PRENATAL VITAMINS PO   Dose:  1 tablet        Take 1 tablet by mouth daily   Refills:  0        senna-docusate 8.6-50 MG per tablet   Commonly known as:  SENOKOT-S;PERICOLACE   Dose:  1-2 tablet   Quantity:  100 tablet        Take 1-2 tablets by mouth 2 times daily   Refills:  0        vitamin D 2000 UNITS tablet   Dose:  500 Units   Quantity:  60 tablet        Take 500 Units by mouth 2 times daily   Refills:  3                Prescriptions were sent or printed at these locations (3 Prescriptions)                   [facility]   [address] [address]    Telephone:  [phone]   Fax:  [phone]   Hours:                  Printed at Department/Unit printer (1 of 3)         oxyCODONE (ROXICODONE) 5 MG IR tablet                 These medications are not ready yet because we are checking if your insurance will help you pay for them. Call your pharmacy to confirm that  your medication is ready for pickup. It may take up to 24 hours for them to receive the prescription. If the prescription is not ready within 3 business days, please contact your clinic or your provider (2 of 3)         amoxicillin-clavulanate (AUGMENTIN) 875-125 MG per tablet               metroNIDAZOLE (FLAGYL) 500 MG tablet                Procedures and tests performed during your visit     Procedure/Test Number of Times Performed    Blood culture 2    CBC with platelets differential 1    CRP inflammation 1    Comprehensive metabolic panel 1    Erythrocyte sedimentation rate auto 1    Lactic acid whole blood 1    MR Lumbar Spine w/o & w Contrast 1    Peripheral IV: Standard 1    UA reflex to Microscopic and Culture 1    US Pelvic Complete w Transvaginal 1      Orders Needing Specimen Collection     None      Pending Results     Date and Time Order Name Status Description    10/25/2017 2107 US Pelvic Complete w Transvaginal Preliminary     10/25/2017 1933 Blood culture In process     10/25/2017 1933 Blood culture In process             Pending Culture Results     Date and Time Order Name Status Description    10/25/2017 1933 Blood culture In process     10/25/2017 1933 Blood culture In process             Pending Results Instructions     If you had any lab results that were not finalized at the time of your Discharge, you can call the ED Lab Result RN at [phone]. You will be contacted by this team for any positive Lab results or changes in treatment. The nurses are available 7 days a week from 10A to 6:30P.  You can leave a message 24 hours per day and they will return your call.        Thank you for choosing [facility]       Thank you for choosing [facility] for your care. Our goal is always to provide you with excellent care. Hearing back from our patients is one way we can continue to improve our services. Please take a few minutes to complete the written survey that you may receive in the mail after you visit  "with us. Thank you!        [employer] Information     [employer] lets you send messages to your doctor, view your test results, renew your prescriptions, schedule appointments and more. To sign up, go to [email or web address] . Click on \"Log in\" on the left side of the screen, which will take you to the Welcome page. Then click on \"Sign up Now\" on the right side of the page.     You will be asked to enter the access code listed below, as well as some personal information. Please follow the directions to create your username and password.     Your access code is: [email or web address]  Expires: 2017 10:37 AM     Your access code will  in 90 days. If you need help or a new code, please call your [facility] clinic or [phone].        Care EveryWhere ID     This is your Care EveryWhere ID. This could be used by other organizations to access your [facility] medical records  [id number]        Equal Access to Services     JOVITA CASTELLANOS : Hadii ana laura smith hadasho Sofabianaali, waaxda luqadaha, qaybta kaalmada adeegyasang, grant burleson . So [facility] [phone].    ATENCIÓN: Si habla español, tiene a browne disposición servicios gratuitos de asistencia lingüística. Llame al [phone].    We comply with applicable federal civil rights laws and [place] laws. We do not discriminate on the basis of race, color, national origin, age, disability, sex, sexual orientation, or gender identity.            After Visit Summary       This is your record. Keep this with you and show to your community pharmacist(s) and doctor(s) at your next visit.                  "

## 2017-10-26 NOTE — DISCHARGE INSTRUCTIONS
1.  Please start the Augmentin and Metronidazole ( antibiotics ) tomorrow morning.    2.  Please take Ibuprofen 600 mg every 6 hours as needed for fevers or discomfort.  Take Tylenol 1,000 mg every 6 hours as needed if the Ibuprofen is not working.    3.  We sent Dr. Crisostomo a work in message for a recheck appointment on Friday.  His nurse should be calling you with options.

## 2017-10-26 NOTE — TELEPHONE ENCOUNTER
Patient was seen in ED on  10.25.17 by Pérez Martinez for endometritis. He would like her to follow up with you on Friday 10.27.17 but your schedule is full. He said that he would be sending you a message regarding this patient. Please contact patient as to where you would be able to work her in on Friday.    Thank you  Nahomy PERAZA  [facility] Emergency Dept   [phone]

## 2017-10-27 ENCOUNTER — OFFICE VISIT (OUTPATIENT)
Dept: FAMILY MEDICINE | Facility: OTHER | Age: 27
End: 2017-10-27
Payer: COMMERCIAL

## 2017-10-27 VITALS
SYSTOLIC BLOOD PRESSURE: 98 MMHG | BODY MASS INDEX: 38.03 KG/M2 | HEART RATE: 84 BPM | DIASTOLIC BLOOD PRESSURE: 56 MMHG | TEMPERATURE: 97.4 F | HEIGHT: 67 IN | WEIGHT: 242.3 LBS | RESPIRATION RATE: 16 BRPM

## 2017-10-27 DIAGNOSIS — R93.89 ENDOMETRIAL THICKENING ON ULTRA SOUND: ICD-10-CM

## 2017-10-27 PROCEDURE — 99213 OFFICE O/P EST LOW 20 MIN: CPT | Performed by: FAMILY MEDICINE

## 2017-10-27 ASSESSMENT — PAIN SCALES - GENERAL: PAINLEVEL: NO PAIN (0)

## 2017-10-27 NOTE — PROGRESS NOTES
SUBJECTIVE:                                                    Valarie Talavera is a 27 year old female who presents to clinic today for the following health issues:      HPI    ED/UC Followup:    Facility:  [facility]  Date of visit: 10/25/17  Reason for visit: Fever, Vaginal bleeding  Current Status: Better, she said she hasn't had to take pain meds     Pt had fairly significant symptoms of postpartum endometritis.  Feeling much better.  No pain meds since last night.  No fever for about 24 hours.      Bleeding is back to normal.      Her lactation stopped for about 10 hours.  Coming back now.      Problem list and histories reviewed & adjusted, as indicated.  Additional history: as documented      Current Outpatient Prescriptions   Medication Sig Dispense Refill     amoxicillin-clavulanate (AUGMENTIN) 875-125 MG per tablet Take 1 tablet by mouth 2 times daily for 10 days 20 tablet 0     metroNIDAZOLE (FLAGYL) 500 MG tablet Take 1 tablet (500 mg) by mouth 3 times daily for 10 days 30 tablet 0     oxyCODONE (ROXICODONE) 5 MG IR tablet Take 1 tablet (5 mg) by mouth every 6 hours as needed for pain 10 tablet 0     ibuprofen (ADVIL/MOTRIN) 800 MG tablet Take 1 tablet (800 mg) by mouth every 8 hours as needed for other (cramping) 90 tablet 0     senna-docusate (SENOKOT-S;PERICOLACE) 8.6-50 MG per tablet Take 1-2 tablets by mouth 2 times daily 100 tablet      lanolin ointment Apply topically every hour as needed for dry skin (sore nipples)       Cholecalciferol (VITAMIN D) 2000 UNITS tablet Take 500 Units by mouth 2 times daily 60 tablet 3     omeprazole (PRILOSEC) 20 MG CR capsule Take 1 capsule (20 mg) by mouth daily 90 capsule 1     Prenatal Multivit-Min-Fe-FA (PRENATAL VITAMINS PO) Take 1 tablet by mouth daily       Recent Labs   Lab Test  10/25/17   1830  08/23/17   1518   08/30/16   1153  02/02/16   1345  07/24/15   1801   LDL   --    --    --    --   74   --    HDL   --    --    --    --   29*   --    TRIG   --  "   --    --    --   233*   --    ALT  28  21   --    --    --   22   CR  0.75  0.49*   < >   --    --   0.62   GFRESTIMATED  >90  >90   < >   --    --   >90  Non  GFR Calc     GFRESTBLACK  >90  >90   < >   --    --   >90   GFR Calc     POTASSIUM  3.9  3.8   < >   --    --   3.7   TSH   --    --    --   0.46   --    --     < > = values in this interval not displayed.      BP Readings from Last 3 Encounters:   10/27/17 98/56   10/25/17 108/64   10/16/17 124/67    Wt Readings from Last 3 Encounters:   10/27/17 242 lb 4.8 oz (109.9 kg)   10/25/17 240 lb 3.2 oz (109 kg)   10/13/17 263 lb 14.4 oz (119.7 kg)                  ROS:  Constitutional, HEENT, cardiovascular, pulmonary, gi and gu systems are negative, except as otherwise noted.      OBJECTIVE:   BP 98/56 (BP Location: Left arm, Patient Position: Chair, Cuff Size: Adult Large)  Pulse 84  Temp 97.4  F (36.3  C) (Temporal)  Resp 16  Ht 5' 6.53\" (1.69 m)  Wt 242 lb 4.8 oz (109.9 kg)  LMP 01/11/2017 (Exact Date)  BMI 38.49 kg/m2  Body mass index is 38.49 kg/(m^2).  GENERAL: healthy, alert and no distress  NECK: no adenopathy, no asymmetry, masses, or scars and thyroid normal to palpation  RESP: lungs clear to auscultation - no rales, rhonchi or wheezes  CV: regular rate and rhythm, normal S1 S2, no S3 or S4, no murmur, click or rub, no peripheral edema and peripheral pulses strong  ABDOMEN: mild suprapubic tenderness  MS: no gross musculoskeletal defects noted, no edema    Diagnostic Test Results:  Results for orders placed or performed during the hospital encounter of 10/25/17   MR Lumbar Spine w/o & w Contrast    Narrative    MR LUMBAR SPINE WITHOUT AND WITH CONTRAST 10/25/2017 8:34 PM    HISTORY:  Low back pain, fever, history of obstetrical epidural 10  days prior, concern for epidural abscess.    COMPARISON: None.    TECHNIQUE: MR lumbar spine without and with 11 mL Gadavist.    FINDINGS: Conus lies at T12 and appears " normal. There is no evidence  for epidural abscess. No pathologic enhancement with gadolinium. No  evidence for osteomyelitis.    Alignment is normal through the sacrum. Upper lumbar spine is negative  through L3-4.    L4-5: Mild dehydration of disc material and degenerative narrowing of  the interspace. Moderate broad-based central disc protrusion with mild  central stenosis. No significant foraminal stenosis.    L5-S1: Dehydration of the disc material. Small broad-based central  disc protrusion. Minimal central stenosis. No significant foraminal  stenosis.      Impression    IMPRESSION:  1. Degenerative disc disease with central disc protrusions at L4-5 and  lumbosacral levels.  2. No evidence for epidural abscess. No pathologic enhancement with  gadolinium.    ELLEN HAYDEN MD   US Pelvic Complete w Transvaginal    Narrative    ULTRASOUND PELVIS WITH TRANSVAGINAL IMAGING   10/25/2017 9:43 PM     HISTORY: Gave birth on 10/14/2017. Pelvic pain.    COMPARISON: 9/19/2017.    TECHNIQUE: Endovaginal imaging was also performed to better evaluate  the uterus.    FINDINGS: The uterus is anteflexed, measuring 12.7 x 8.8 x 10.6 cm in  long axis, short axis and transverse dimensions respectively. No  myometrial masses. The endometrial stripe is heterogeneous, measuring  3.5 cm in thickness. Blood flow is visualized within portions of the  thickened endometrial stripe. Fluid is also present in the endometrial  cavity. The right ovary is unremarkable, measuring 4.4 x 3.6 x 1.2 cm.  The left ovary is not visualized. No adnexal masses. No free fluid in  the pelvis.      Impression    IMPRESSION:   1. The endometrial stripe is heterogeneous and thickened and has  portions containing blood flow. These findings are suspicious for  retained products of conception.  2. Unremarkable appearance of the right ovary. The left ovary is not  visualized.  3. No free fluid in the pelvis.    GAMALIEL COVINGTON MD   CBC with platelets  differential   Result Value Ref Range    WBC 6.7 4.0 - 11.0 10e9/L    RBC Count 5.07 3.8 - 5.2 10e12/L    Hemoglobin 13.8 11.7 - 15.7 g/dL    Hematocrit 43.4 35.0 - 47.0 %    MCV 86 78 - 100 fl    MCH 27.2 26.5 - 33.0 pg    MCHC 31.8 31.5 - 36.5 g/dL    RDW 14.9 10.0 - 15.0 %    Platelet Count 304 150 - 450 10e9/L    Diff Method Automated Method     % Neutrophils 68.3 %    % Lymphocytes 19.0 %    % Monocytes 11.3 %    % Eosinophils 1.2 %    % Basophils 0.1 %    % Immature Granulocytes 0.1 %    Absolute Neutrophil 4.6 1.6 - 8.3 10e9/L    Absolute Lymphocytes 1.3 0.8 - 5.3 10e9/L    Absolute Monocytes 0.8 0.0 - 1.3 10e9/L    Absolute Eosinophils 0.1 0.0 - 0.7 10e9/L    Absolute Basophils 0.0 0.0 - 0.2 10e9/L    Abs Immature Granulocytes 0.0 0 - 0.4 10e9/L   Comprehensive metabolic panel   Result Value Ref Range    Sodium 140 133 - 144 mmol/L    Potassium 3.9 3.4 - 5.3 mmol/L    Chloride 105 94 - 109 mmol/L    Carbon Dioxide 25 20 - 32 mmol/L    Anion Gap 10 3 - 14 mmol/L    Glucose 78 70 - 99 mg/dL    Urea Nitrogen 20 7 - 30 mg/dL    Creatinine 0.75 0.52 - 1.04 mg/dL    GFR Estimate >90 >60 mL/min/1.7m2    GFR Estimate If Black >90 >60 mL/min/1.7m2    Calcium 9.9 8.5 - 10.1 mg/dL    Bilirubin Total 0.3 0.2 - 1.3 mg/dL    Albumin 3.3 (L) 3.4 - 5.0 g/dL    Protein Total 7.6 6.8 - 8.8 g/dL    Alkaline Phosphatase 125 40 - 150 U/L    ALT 28 0 - 50 U/L    AST 13 0 - 45 U/L   Lactic acid whole blood   Result Value Ref Range    Lactic Acid 0.8 0.7 - 2.0 mmol/L   CRP inflammation   Result Value Ref Range    CRP Inflammation 39.6 (H) 0.0 - 8.0 mg/L   UA reflex to Microscopic and Culture   Result Value Ref Range    Color Urine Yellow     Appearance Urine Clear     Glucose Urine Negative NEG^Negative mg/dL    Bilirubin Urine Negative NEG^Negative    Ketones Urine Negative NEG^Negative mg/dL    Specific Gravity Urine 1.023 1.003 - 1.035    Blood Urine Moderate (A) NEG^Negative    pH Urine 5.0 5.0 - 7.0 pH    Protein Albumin Urine  Negative NEG^Negative mg/dL    Urobilinogen mg/dL 0.0 0.0 - 2.0 mg/dL    Nitrite Urine Negative NEG^Negative    Leukocyte Esterase Urine Negative NEG^Negative    Source Unspecified Urine     RBC Urine 2 0 - 2 /HPF    WBC Urine 1 0 - 2 /HPF    Squamous Epithelial /HPF Urine <1 0 - 1 /HPF    Mucous Urine Present (A) NEG^Negative /LPF   Erythrocyte sedimentation rate auto   Result Value Ref Range    Sed Rate 19 0 - 20 mm/h   Blood culture   Result Value Ref Range    Specimen Description Blood Right Hand     Special Requests ED JK     Culture Micro No growth after 2 days    Blood culture   Result Value Ref Range    Specimen Description Blood Right Arm     Special Requests ED JK     Culture Micro No growth after 2 days        ASSESSMENT/PLAN:       ICD-10-CM    1. Postpartum endometritis O86.12    2. Endometrial thickening on ultra sound R93.8      She appears to be improving. We'll continue current regimen until antibiotics are gone. Discussed what to watch for. If she has any worsening, will refer for probable D&C.    Portions of this note were completed using Dragon dictation software.  Although reviewed, there may be typographical and other inadvertent errors that remain.             Patient Instructions   Thank you for visiting [facility]    Finish your antibiotics.    If not continuing to improve or if worsening, let me know.  At that point, we would consider referring for a D & C.      OK to resume breastfeeding.    Please see me in 4 weeks for postpartum check.       If you had imaging scheduled please refer to your radiology prep sheet.    Appointment    Date_______________     Time_____________    Day:   M TU W TH F    With____________________________    Location_________________________    If you need medication refills, please contact your pharmacy 3 days before your prescriptions runs out. If you are out of refills, your pharmacy will contact contact the clinic.    Contact us or return  if questions or concerns.     -Your Care Team:  MD Shi Frazier PA-C Joel De Haan, PA-C Elizabeth McLean, APRN CNP    General information about your clinic      Clinic hours:     Lab hours:  Phone [phone]  Monday 7:30 am-7 pm    Monday 8:30 am-6:30 pm  Tuesday-Friday 7:30 am-5 pm   Tuesday-Friday 8:30 am-4:30 pm    Pharmacy hours:  Phone [phone]  Monday 8:30 am-7pm  Tuesday-Friday 8:30am-6 pm                                       Mychart assistance [phone]        We would like to hear from you, how was your visit today?    Daisy Vazquez  Patient Information Supervisor   Patient Care Supervisor  [facility], and [facility], [facility]  [phone]         Luis Crisostomo MD, MD  [facility]

## 2017-10-27 NOTE — NURSING NOTE
"Chief Complaint   Patient presents with     Hospital F/U     Panel Management       Initial BP 98/56 (BP Location: Left arm, Patient Position: Chair, Cuff Size: Adult Large)  Pulse 84  Temp 97.4  F (36.3  C) (Temporal)  Resp 16  Ht 5' 6.53\" (1.69 m)  Wt 242 lb 4.8 oz (109.9 kg)  LMP 01/11/2017 (Exact Date)  BMI 38.49 kg/m2 Estimated body mass index is 38.49 kg/(m^2) as calculated from the following:    Height as of this encounter: 5' 6.53\" (1.69 m).    Weight as of this encounter: 242 lb 4.8 oz (109.9 kg).  Medication Reconciliation: complete  Berenice Welch, BEAR    "

## 2017-10-27 NOTE — PATIENT INSTRUCTIONS
Thank you for visiting [facility]    Finish your antibiotics.    If not continuing to improve or if worsening, let me know.  At that point, we would consider referring for a D & C.      OK to resume breastfeeding.    Please see me in 4 weeks for postpartum check.       If you had imaging scheduled please refer to your radiology prep sheet.    Appointment    Date_______________     Time_____________    Day:   M TU W TH F    With____________________________    Location_________________________    If you need medication refills, please contact your pharmacy 3 days before your prescriptions runs out. If you are out of refills, your pharmacy will contact contact the clinic.    Contact us or return if questions or concerns.     -Your Care Team:  MD Shi Frazier PA-C Joel De Haan, PA-C Elizabeth McLean, APRN CNP    General information about your clinic      Clinic hours:     Lab hours:  Phone [phone]  Monday 7:30 am-7 pm    Monday 8:30 am-6:30 pm  Tuesday-Friday 7:30 am-5 pm   Tuesday-Friday 8:30 am-4:30 pm    Pharmacy hours:  Phone [phone]  Monday 8:30 am-7pm  Tuesday-Friday 8:30am-6 pm                                       Mychart assistance [phone]        We would like to hear from you, how was your visit today?    Daisy Vazquez  Patient Information Supervisor   Patient Care Supervisor  [facility], and [facility], and [facility]  [phone]

## 2017-10-27 NOTE — MR AVS SNAPSHOT
After Visit Summary   10/27/2017    Valarie Talavera    MRN: [id number]           Patient Information     Date Of Birth          1990        Visit Information        Provider Department      10/27/2017 1:30 PM Luis Crisostomo MD [facility]        Care Instructions    Thank you for visiting [facility]    Finish your antibiotics.    If not continuing to improve or if worsening, let me know.  At that point, we would consider referring for a D & C.      OK to resume breastfeeding.    Please see me in 4 weeks for postpartum check.       If you had imaging scheduled please refer to your radiology prep sheet.    Appointment    Date_______________     Time_____________    Day:   M TU W TH F    With____________________________    Location_________________________    If you need medication refills, please contact your pharmacy 3 days before your prescriptions runs out. If you are out of refills, your pharmacy will contact contact the clinic.    Contact us or return if questions or concerns.     -Your Care Team:  MD Shi Frazier PA-C Joel De Haan, PA-C Elizabeth McLean, APRN CNP    General information about your clinic      Clinic hours:     Lab hours:  Phone [phone]  Monday 7:30 am-7 pm    Monday 8:30 am-6:30 pm  Tuesday-Friday 7:30 am-5 pm   Tuesday-Friday 8:30 am-4:30 pm    Pharmacy hours:  Phone [phone]  Monday 8:30 am-7pm  Tuesday-Friday 8:30am-6 pm                                       Mychart assistance [phone]        We would like to hear from you, how was your visit today?    Daisy Vazquez  Patient Information Supervisor   Patient Care Supervisor  [facility]  [phone]             Follow-ups after your visit        Who to contact     If you have questions or need follow up information about today's  "clinic visit or your schedule please contact [facility] directly at [phone].  Normal or non-critical lab and imaging results will be communicated to you by MyChart, letter or phone within 4 business days after the clinic has received the results. If you do not hear from us within 7 days, please contact the clinic through [employer] or phone. If you have a critical or abnormal lab result, we will notify you by phone as soon as possible.  Submit refill requests through [employer]ach[employer] or call your pharmacy and they will forward the refill request to us. Please allow 3 business days for your refill to be completed.          Additional Information About Your Visit        MyChart Information     [employer] lets you send messages to your doctor, view your test results, renew your prescriptions, schedule appointments and more. To sign up, go to [email or web address] . Click on \"Log in\" on the left side of the screen, which will take you to the Welcome page. Then click on \"Sign up Now\" on the right side of the page.     You will be asked to enter the access code listed below, as well as some personal information. Please follow the directions to create your username and password.     Your access code is: [email or web address]  Expires: 2017 10:37 AM     Your access code will  in 90 days. If you need help or a new code, please call your [place] clinic or [phone].        Care EveryWhere ID     This is your Care EveryWhere ID. This could be used by other organizations to access your [place] medical records  [id number]        Your Vitals Were     Pulse Temperature Respirations Height Last Period BMI (Body Mass Index)    84 97.4  F (36.3  C) (Temporal) 16 5' 6.53\" (1.69 m) 2017 (Exact Date) 38.49 kg/m2       Blood Pressure from Last 3 Encounters:   10/27/17 98/56   10/25/17 108/64   10/16/17 124/67    Weight from Last 3 Encounters:   10/27/17 242 lb 4.8 oz (109.9 kg)   10/25/17 240 lb 3.2 oz (109 kg) "   10/13/17 263 lb 14.4 oz (119.7 kg)              Today, you had the following     No orders found for display       Primary Care Provider Office Phone # Fax #    Luis Crisostomo [phone] [address] [place] [address]        Equal Access to Services     [facility]: Hadii aad ku hadasho Soomaali, waaxda luqadaha, qaybta kaalmada adeegyada, waxay idiin hayaan adeeg oksana lamarion ah. So [facility] [phone].    ATENCIÓN: Si habla español, tiene a browne disposición servicios gratuitos de asistencia lingüística. [facility] [phone].    We comply with applicable federal civil rights laws and [place] laws. We do not discriminate on the basis of race, color, national origin, age, disability, sex, sexual orientation, or gender identity.            Thank you!     Thank you for choosing [facility]  for your care. Our goal is always to provide you with excellent care. Hearing back from our patients is one way we can continue to improve our services. Please take a few minutes to complete the written survey that you may receive in the mail after your visit with us. Thank you!             Your Updated Medication List - Protect others around you: Learn how to safely use, store and throw away your medicines at www.disposemymeds.org.          This list is accurate as of: 10/27/17  1:52 PM.  Always use your most recent med list.                   Brand Name Dispense Instructions for use Diagnosis    amoxicillin-clavulanate 875-125 MG per tablet    AUGMENTIN    20 tablet    Take 1 tablet by mouth 2 times daily for 10 days        ibuprofen 800 MG tablet    ADVIL/MOTRIN    90 tablet    Take 1 tablet (800 mg) by mouth every 8 hours as needed for other (cramping)    NVD (normal vaginal delivery)       lanolin ointment      Apply topically every hour as needed for dry skin (sore nipples)    NVD (normal vaginal delivery)       metroNIDAZOLE 500 MG tablet    FLAGYL    30 tablet    Take 1  tablet (500 mg) by mouth 3 times daily for 10 days        omeprazole 20 MG CR capsule    priLOSEC    90 capsule    Take 1 capsule (20 mg) by mouth daily    Encounter for supervision of other normal pregnancy in third trimester       oxyCODONE 5 MG IR tablet    ROXICODONE    10 tablet    Take 1 tablet (5 mg) by mouth every 6 hours as needed for pain        PRENATAL VITAMINS PO      Take 1 tablet by mouth daily        senna-docusate 8.6-50 MG per tablet    SENOKOT-S;PERICOLACE    100 tablet    Take 1-2 tablets by mouth 2 times daily    NVD (normal vaginal delivery)       vitamin D 2000 UNITS tablet     60 tablet    Take 500 Units by mouth 2 times daily    Breast feeding status of mother

## 2017-10-31 LAB
BACTERIA SPEC CULT: NORMAL
BACTERIA SPEC CULT: NORMAL
Lab: NORMAL
Lab: NORMAL
SPECIMEN SOURCE: NORMAL
SPECIMEN SOURCE: NORMAL

## 2017-11-11 ENCOUNTER — HEALTH MAINTENANCE LETTER (OUTPATIENT)
Age: 27
End: 2017-11-11

## 2018-02-10 ENCOUNTER — HEALTH MAINTENANCE LETTER (OUTPATIENT)
Age: 28
End: 2018-02-10

## 2018-03-11 ENCOUNTER — HOSPITAL ENCOUNTER (EMERGENCY)
Facility: CLINIC | Age: 28
Discharge: HOME | End: 2018-03-11
Admitting: NURSE PRACTITIONER
Payer: MEDICAID

## 2018-03-11 VITALS
OXYGEN SATURATION: 99 % | SYSTOLIC BLOOD PRESSURE: 122 MMHG | WEIGHT: 220 LBS | BODY MASS INDEX: 33.34 KG/M2 | DIASTOLIC BLOOD PRESSURE: 68 MMHG | RESPIRATION RATE: 16 BRPM | TEMPERATURE: 97.9 F | HEIGHT: 68 IN | HEART RATE: 78 BPM

## 2018-03-11 DIAGNOSIS — M54.9 BILATERAL BACK PAIN, UNSPECIFIED BACK LOCATION, UNSPECIFIED CHRONICITY: ICD-10-CM

## 2018-03-11 PROCEDURE — 99284 EMERGENCY DEPT VISIT MOD MDM: CPT | Mod: 25 | Performed by: NURSE PRACTITIONER

## 2018-03-11 PROCEDURE — 99284 EMERGENCY DEPT VISIT MOD MDM: CPT | Mod: Z6 | Performed by: NURSE PRACTITIONER

## 2018-03-11 PROCEDURE — 96372 THER/PROPH/DIAG INJ SC/IM: CPT | Performed by: NURSE PRACTITIONER

## 2018-03-11 PROCEDURE — 25000132 ZZH RX MED GY IP 250 OP 250 PS 637: Performed by: NURSE PRACTITIONER

## 2018-03-11 PROCEDURE — 25000128 H RX IP 250 OP 636: Performed by: NURSE PRACTITIONER

## 2018-03-11 RX ORDER — MELOXICAM 15 MG/1
15 TABLET ORAL DAILY
Qty: 15 TABLET | Refills: 0 | Status: SHIPPED | OUTPATIENT
Start: 2018-03-11 | End: 2018-06-04

## 2018-03-11 RX ORDER — DIAZEPAM 5 MG
5 TABLET ORAL EVERY 8 HOURS PRN
Qty: 10 TABLET | Refills: 0 | Status: SHIPPED | OUTPATIENT
Start: 2018-03-11 | End: 2019-01-29

## 2018-03-11 RX ORDER — KETOROLAC TROMETHAMINE 15 MG/ML
30 INJECTION, SOLUTION INTRAMUSCULAR; INTRAVENOUS ONCE
Status: COMPLETED | OUTPATIENT
Start: 2018-03-11 | End: 2018-03-11

## 2018-03-11 RX ORDER — DIAZEPAM 5 MG
5 TABLET ORAL ONCE
Status: COMPLETED | OUTPATIENT
Start: 2018-03-11 | End: 2018-03-11

## 2018-03-11 RX ORDER — HYDROCODONE BITARTRATE AND ACETAMINOPHEN 5; 325 MG/1; MG/1
1 TABLET ORAL ONCE
Status: COMPLETED | OUTPATIENT
Start: 2018-03-11 | End: 2018-03-11

## 2018-03-11 RX ADMIN — HYDROCODONE BITARTRATE AND ACETAMINOPHEN 1 TABLET: 5; 325 TABLET ORAL at 17:46

## 2018-03-11 RX ADMIN — KETOROLAC TROMETHAMINE 30 MG: 15 INJECTION, SOLUTION INTRAMUSCULAR; INTRAVENOUS at 17:44

## 2018-03-11 RX ADMIN — DIAZEPAM 5 MG: 5 TABLET ORAL at 17:46

## 2018-03-11 ASSESSMENT — ENCOUNTER SYMPTOMS
DYSURIA: 0
HEMATURIA: 0
DIAPHORESIS: 0
FATIGUE: 0
WEAKNESS: 0
CONSTIPATION: 0
NAUSEA: 0
DIFFICULTY URINATING: 0
NUMBNESS: 0
BACK PAIN: 1
ABDOMINAL PAIN: 0
ABDOMINAL DISTENTION: 0
ARTHRALGIAS: 1

## 2018-03-11 NOTE — ED PROVIDER NOTES
History     Chief Complaint   Patient presents with     Back Pain     The history is provided by the patient. No  was used.     Valarie Talavera is a 27 year old female presenting with mid to lower back pain while standing packing moving boxes. Pain is throbbing and radiates into buttocks. She has been performing more twisting and bending in last two days in preparation of moving. She denies LE weakness, saddle paresthesia, change in bowel and bladder. She has hx of back pain.    Problem List:    Patient Active Problem List    Diagnosis Date Noted     Term pregnancy 10/14/2017     Priority: Medium     Gastroesophageal reflux disease with esophagitis 10/04/2017     Priority: Medium     Back pain 08/23/2017     Priority: Medium     Hyperglycemia 07/13/2017     Priority: Medium     Nuchal cord, not applicable or unspecified fetus 06/23/2017     Priority: Medium     Encounter for supervision of other normal pregnancy in third trimester 03/11/2017     Priority: Medium     Vitamin D deficiency 02/02/2016     Priority: Medium     HSIL on Pap smear of cervix 02/02/2016     Priority: Medium     2/2/16: Pap - HSIL. Plan colp  2/11/16 [place].  HSIL. Age 25  Plan: per tele enc on 2/18/16 [place] and cytology in August.  Will have pathologist differentiate between WESLY 2 and 3 if possible.  If lesion is worse, then LEEP.  If it is the same or better, then colp and cytology in 6 months.    8/30/16 HSIL pap/+ HR HPV (not 16 or 18). [place] not completed.  Per Dr. Luna, have pt. Schedule colp bef 11/30/16.  9/9/16 Pt. Advised.  9/22/16 [place]. NO SHOW.  10/6/16 [place] visit. NO SHOW.  12/8/16 Reminder letter.   1/11/17 [place] not done. Tracking updated for 6 mo colp/pap.   3/10/17 ASCUS pap/+ HR HPV (not 16 or 18). approx 9 weeks pregnant. Plan: colp around 20 weeks gestation, approx 5/26/17  10/14/17 delivered baby. (6 wk pp due 11/25/17)          Past Medical History:    Past Medical History:   Diagnosis Date     Anxiety   "    Depression with anxiety      Depressive disorder      HSIL on Pap smear of cervix 2/2/2016     Low blood pressure      Vitamin D deficiency        Past Surgical History:    Past Surgical History:   Procedure Laterality Date     NO HISTORY OF SURGERY         Family History:    Family History   Problem Relation Age of Onset     Hypertension No family hx of      CEREBROVASCULAR DISEASE No family hx of      Hyperlipidemia No family hx of        Social History:  Marital Status:  Single [1]  Social History   Substance Use Topics     Smoking status: Former Smoker     Types: Cigarettes     Quit date: 2/1/2017     Smokeless tobacco: Never Used     Alcohol use No      Comment: Occasionally        Medications:      meloxicam (MOBIC) 15 MG tablet   diazepam (VALIUM) 5 MG tablet   lanolin ointment   Cholecalciferol (VITAMIN D) 2000 UNITS tablet   omeprazole (PRILOSEC) 20 MG CR capsule   Prenatal Multivit-Min-Fe-FA (PRENATAL VITAMINS PO)         Review of Systems   Constitutional: Negative for diaphoresis and fatigue.   Gastrointestinal: Negative for abdominal distention, abdominal pain, constipation and nausea.   Genitourinary: Negative for difficulty urinating, dysuria and hematuria.   Musculoskeletal: Positive for arthralgias and back pain. Negative for gait problem.   Skin: Negative.    Allergic/Immunologic: Negative for immunocompromised state.   Neurological: Negative for weakness and numbness.       Physical Exam   BP: 121/86  Pulse: 93  Temp: 97.9  F (36.6  C)  Resp: 16  Height: 172.7 cm (5' 8\")  Weight: 99.8 kg (220 lb)  SpO2: 99 %      Physical Exam   Constitutional: She is oriented to person, place, and time. She appears well-developed and well-nourished. No distress.   HENT:   Head: Normocephalic and atraumatic.   Abdominal: Soft.   Musculoskeletal: She exhibits tenderness.        Lumbar back: She exhibits tenderness, pain and spasm. She exhibits no swelling and no edema.        Back:    Neurological: She is alert " and oriented to person, place, and time. She has normal reflexes. No sensory deficit. Gait normal. GCS eye subscore is 4. GCS verbal subscore is 5. GCS motor subscore is 6.   Reflex Scores:       Patellar reflexes are 2+ on the right side and 2+ on the left side.  Skin: Skin is warm and dry. She is not diaphoretic.   Psychiatric: She has a normal mood and affect. Her behavior is normal.   Nursing note and vitals reviewed.      ED Course     ED Course     Procedures               Critical Care time:  none               No results found for this or any previous visit (from the past 24 hour(s)).    Assessments & Plan (with Medical Decision Making)  Improved with medication. Discussed with patient rest, ice, and NSAIDs. Recommend Camphor ointment for muscle pain and no heavy lifting, no excessive bending and twisting. She is to follow up with her PCP this week.      I have reviewed the nursing notes.    I have reviewed the findings, diagnosis, plan and need for follow up with the patient.       Discharge Medication List as of 3/11/2018  6:47 PM      START taking these medications    Details   meloxicam (MOBIC) 15 MG tablet Take 1 tablet (15 mg) by mouth daily for 14 days, Disp-15 tablet, R-0, E-Prescribe      diazepam (VALIUM) 5 MG tablet Take 1 tablet (5 mg) by mouth every 8 hours as needed for muscle spasms (MUSCLE SPASM), Disp-10 tablet, R-0, Local Print             Final diagnoses:   Bilateral back pain, unspecified back location, unspecified chronicity       3/11/2018   [facility] EMERGENCY DEPARTMENT     Lynsey Sullivan APRN CNP  03/11/18 1912       Lynsey Sullivan APRN CNP  03/12/18 0057

## 2018-03-11 NOTE — ED AVS SNAPSHOT
[facility] Emergency Department    [address] [place] [address]    Phone:  [phone]    Fax:  [phone]                                       Valarie Talavera   MRN: [id number]    Department:  [facility] Emergency Department   Date of Visit:  3/11/2018           Patient Information     Date Of Birth          1990        Your diagnoses for this visit were:     None      Follow-up Information     Follow up with [facility] Emergency Department.    Specialty:  EMERGENCY MEDICINE    Why:  As needed    Contact information:    [address] [place] [address] [phone]    Additional information:    From [address]: Exit at [address] on south side of [place]. Turn right on [address]. Turn left at stoplight on [address]. [facility] will be in view two blocks ahead        Follow up with Luis Crisostomo MD. Schedule an appointment as soon as possible for a visit in 3 days.    Specialty:  Family Practice    Contact information:    [address] [place] [id number] [phone]        Discharge References/Attachments     BACK SPRAIN/STRAIN (ENGLISH)      24 Hour Appointment Hotline       To make an appointment at any [place] clinic, call [phone] ([phone]). If you don't have a family doctor or clinic, we will help you find one. [place] clinics are conveniently located to serve the needs of you and your family.             Review of your medicines      START taking        Dose / Directions Last dose taken    diazepam 5 MG tablet   Commonly known as:  VALIUM   Dose:  5 mg   Quantity:  10 tablet        Take 1 tablet (5 mg) by mouth every 8 hours as needed for muscle spasms (MUSCLE SPASM)   Refills:  0        meloxicam 15 MG tablet   Commonly known as:  MOBIC   Dose:  15 mg   Quantity:  15 tablet        Take 1 tablet (15 mg) by mouth daily for 14 days   Refills:  0          Our records show that you are taking the  medicines listed below. If these are incorrect, please call your family doctor or clinic.        Dose / Directions Last dose taken    lanolin ointment        Apply topically every hour as needed for dry skin (sore nipples)   Refills:  0        omeprazole 20 MG CR capsule   Commonly known as:  priLOSEC   Dose:  20 mg   Quantity:  90 capsule        Take 1 capsule (20 mg) by mouth daily   Refills:  1        PRENATAL VITAMINS PO   Dose:  1 tablet        Take 1 tablet by mouth daily   Refills:  0        vitamin D 2000 UNITS tablet   Dose:  500 Units   Quantity:  60 tablet        Take 500 Units by mouth 2 times daily   Refills:  3          STOP taking        Dose Reason for stopping Comments    ibuprofen 800 MG tablet   Commonly known as:  ADVIL/MOTRIN                      Prescriptions were sent or printed at these locations (2 Prescriptions)                   [facility] - [address]   [address] [place] [address]    Telephone:  [phone]   Fax:  [phone]   Hours:                  E-Prescribed (1 of 2)         meloxicam (MOBIC) 15 MG tablet                 Printed at Department/Unit printer (1 of 2)         diazepam (VALIUM) 5 MG tablet                Orders Needing Specimen Collection     None      Pending Results     No orders found from 3/9/2018 to 3/12/2018.            Pending Culture Results     No orders found from 3/9/2018 to 3/12/2018.            Pending Results Instructions     If you had any lab results that were not finalized at the time of your Discharge, you can call the ED Lab Result RN at [phone]. You will be contacted by this team for any positive Lab results or changes in treatment. The nurses are available 7 days a week from 10A to 6:30P.  You can leave a message 24 hours per day and they will return your call.        Thank you for choosing [facility]       Thank you for choosing [facility] for your care. Our goal is always to provide you  "with excellent care. Hearing back from our patients is one way we can continue to improve our services. Please take a few minutes to complete the written survey that you may receive in the mail after you visit with us. Thank you!        [employer] Information     [employer] lets you send messages to your doctor, view your test results, renew your prescriptions, schedule appointments and more. To sign up, go to [email or web address] . Click on \"Log in\" on the left side of the screen, which will take you to the Welcome page. Then click on \"Sign up Now\" on the right side of the page.     You will be asked to enter the access code listed below, as well as some personal information. Please follow the directions to create your username and password.     Your access code is: [email or web address]  Expires: 2018  8:11 AM     Your access code will  in 90 days. If you need help or a new code, please call your [place] clinic or [phone].        Care EveryWhere ID     This is your Care EveryWhere ID. This could be used by other organizations to access your [place] medical records  [id number]        Equal Access to Services     JOVITA CASTELLANOS : Hadii ana laura Syed, wajeffda iva, qaybta kaalmasang cardoso, grant fine. So [facility] [phone].    ATENCIÓN: Si habla español, tiene a browne disposición servicios gratuitos de asistencia lingüística. [facility] al [phone].    We comply with applicable federal civil rights laws and [place] laws. We do not discriminate on the basis of race, color, national origin, age, disability, sex, sexual orientation, or gender identity.            After Visit Summary       This is your record. Keep this with you and show to your community pharmacist(s) and doctor(s) at your next visit.                  "

## 2018-03-11 NOTE — ED AVS SNAPSHOT
[facility] Emergency Department    [address] [place] [address]    Phone:  [phone]    Fax:  [phone]                                       Valarie Talavera   MRN: [id number]    Department:  [facility] Emergency Department   Date of Visit:  3/11/2018           After Visit Summary Signature Page     I have received my discharge instructions, and my questions have been answered. I have discussed any challenges I see with this plan with the nurse or doctor.    ..........................................................................................................................................  Patient/Patient Representative Signature      ..........................................................................................................................................  Patient Representative Print Name and Relationship to Patient    ..................................................               ................................................  Date                                            Time    ..........................................................................................................................................  Reviewed by Signature/Title    ...................................................              ..............................................  Date                                                            Time

## 2018-05-13 ENCOUNTER — HEALTH MAINTENANCE LETTER (OUTPATIENT)
Age: 28
End: 2018-05-13

## 2018-05-29 NOTE — PROGRESS NOTES
SUBJECTIVE:   Valarie Talavera is a 27 year old female who presents to clinic today for the following health issues:      HPI  Concern - Pain in both hands   Onset: gotten much worse in the last few weeks     Description:   Pain and numbness in both weeks    Intensity: moderate    Progression of Symptoms:  worsening    Accompanying Signs & Symptoms:  - hands are swollen in the morning   Therapies Tried and outcome:advil, braces   Has had ongoing pain in both hands with numbness of 1-3rd digits that is worse during sleep and in the morning and when driving both hand go numb. States that the Right is worse than the Left.  She has worn braces in past without relief. She has taken Nsaid mild relief.      Problem list and histories reviewed & adjusted, as indicated.  Additional history: as documented    BP Readings from Last 3 Encounters:   06/04/18 110/70   03/11/18 122/68   10/27/17 98/56    Wt Readings from Last 3 Encounters:   03/11/18 220 lb (99.8 kg)   10/27/17 242 lb 4.8 oz (109.9 kg)   10/25/17 240 lb 3.2 oz (109 kg)           Labs reviewed in EPIC    ROS:  Constitutional, HEENT, cardiovascular, pulmonary, gi and gu systems are negative, except as otherwise noted.    OBJECTIVE:     /70 (BP Location: Right arm, Patient Position: Chair, Cuff Size: Adult Large)  Pulse 78  Temp 98.4  F (36.9  C) (Oral)  Resp 18  There is no height or weight on file to calculate BMI.  GENERAL: healthy, alert and no distress  RESP: lungs clear to auscultation - no rales, rhonchi or wheezes  CV: regular rate and rhythm, normal S1 S2, no S3 or S4, no murmur, click or rub, no peripheral edema and peripheral pulses strong  MS: bilateral Wrist Exam: WRIST:  Inspection: no swelling  no effusion  Palpation: Tender: Tenderness:, diffusely around wrist  Range of Motion: normal  Strength: no deficits  strength 4/5 right 5/5 left.    Special tests: positive Tinel's at carpal tunnel.   SKIN: no suspicious lesions or  froylan    ASSESSMENT/PLAN:     1. Bilateral carpal tunnel syndrome  Discussed treatment options including splinting at night as she has only splinted during day, elevation and ice. Discussed further evaluation and treatment plan through ortho specialty. Possible CTR if indicated. She would like to be seen by specialty.   - ORTHO  REFERRAL    Patient Instructions     Carpal Tunnel Syndrome    Carpal tunnel syndrome is a painful condition of the wrist and arm. It is caused by pressure on the median nerve.  The median nerve is one of the nerves that give feeling and movement to the hand. It passes through a tunnel in the wrist called the carpal tunnel. This tunnel is made up of bones and ligaments. Narrowing of this tunnel or swelling of the tissues inside the tunnel puts pressure on the median nerve. This causes numbness, pins and needles, or electric shooting pains in your hand and forearm. Often the pain is worse at night and may wake you when you are asleep.  Carpal tunnel syndrome may occur during pregnancy and with use of birth control pills. It is more common in workers who must often bend their wrists. It is also common in people who work with power tools that cause strong vibrations.  Home care    Rest the painful wrist. Avoid repeated bending of the wrist back and forth. This puts pressure on the median nerve. Avoid using power tools with strong vibrations.    If you were given a splint, wear it at night while you sleep. You may also wear it during the day for comfort.    Move your fingers and wrists often to avoid stiffness.    Elevate your arms on pillows when you lie down.    Try using the unaffected hand more.    Try not to hold your wrists in a bent, downward position.    Sometimes changes in the work place may ease symptoms. If you type most of the day, it may help to change the position of your keyboard or add a wrist support. Your wrist should be in a neutral position and not bent back when  typing.    You may use over-the-counter pain medicine to treat pain and inflammation, unless another medicine was prescribed. Anti-inflammatory pain medicines, such as ibuprofen or naproxen may be more effective than acetaminophen, which treats pain, but not inflammation. If you have chronic liver or kidney disease or ever had a stomach ulcer or GI bleeding, talk with your doctor before using these medicines.    Opioid pain medicine will only give temporary relief and does not treat the problem. If pain continues, you may need a shot of a steroid drug into your wrist.    If the above methods fail, you may need surgery. This will open the carpal tunnel and release the pressure on the trapped nerve.  Follow-up care  Follow up with your healthcare provider, or as advised, if the pain doesn t begin to improve within the next week.  If X-rays were taken, you will be notified of any new findings that may affect your care.  When to seek medical advice  Call your healthcare provider right away if any of these occur:    Pain not improving with the above treatment    Fingers or hand become cold, blue, numb, or tingly    Your whole arm becomes swollen or weak  Date Last Reviewed: 11/23/2015 2000-2017 [employer]. [address] [place] [address], [place] [address]. All rights reserved. This information is not intended as a substitute for professional medical care. Always follow your healthcare professional's instructions.            MARI [facility]

## 2018-06-04 ENCOUNTER — OFFICE VISIT (OUTPATIENT)
Dept: FAMILY MEDICINE | Facility: OTHER | Age: 28
End: 2018-06-04
Payer: MEDICAID

## 2018-06-04 VITALS
HEART RATE: 78 BPM | RESPIRATION RATE: 18 BRPM | SYSTOLIC BLOOD PRESSURE: 110 MMHG | DIASTOLIC BLOOD PRESSURE: 70 MMHG | TEMPERATURE: 98.4 F

## 2018-06-04 DIAGNOSIS — G56.03 BILATERAL CARPAL TUNNEL SYNDROME: Primary | ICD-10-CM

## 2018-06-04 PROCEDURE — 99214 OFFICE O/P EST MOD 30 MIN: CPT | Performed by: NURSE PRACTITIONER

## 2018-06-04 NOTE — MR AVS SNAPSHOT
After Visit Summary   6/4/2018    Valarie Talavera    MRN: [id number]           Patient Information     Date Of Birth          1990        Visit Information        Provider Department      6/4/2018 2:30 PM Fabi Dhaliwal APRN [facility]        Today's Diagnoses     Bilateral carpal tunnel syndrome    -  1      Care Instructions      Carpal Tunnel Syndrome    Carpal tunnel syndrome is a painful condition of the wrist and arm. It is caused by pressure on the median nerve.  The median nerve is one of the nerves that give feeling and movement to the hand. It passes through a tunnel in the wrist called the carpal tunnel. This tunnel is made up of bones and ligaments. Narrowing of this tunnel or swelling of the tissues inside the tunnel puts pressure on the median nerve. This causes numbness, pins and needles, or electric shooting pains in your hand and forearm. Often the pain is worse at night and may wake you when you are asleep.  Carpal tunnel syndrome may occur during pregnancy and with use of birth control pills. It is more common in workers who must often bend their wrists. It is also common in people who work with power tools that cause strong vibrations.  Home care    Rest the painful wrist. Avoid repeated bending of the wrist back and forth. This puts pressure on the median nerve. Avoid using power tools with strong vibrations.    If you were given a splint, wear it at night while you sleep. You may also wear it during the day for comfort.    Move your fingers and wrists often to avoid stiffness.    Elevate your arms on pillows when you lie down.    Try using the unaffected hand more.    Try not to hold your wrists in a bent, downward position.    Sometimes changes in the work place may ease symptoms. If you type most of the day, it may help to change the position of your keyboard or add a wrist support. Your wrist should be in a neutral position and not bent back  when typing.    You may use over-the-counter pain medicine to treat pain and inflammation, unless another medicine was prescribed. Anti-inflammatory pain medicines, such as ibuprofen or naproxen may be more effective than acetaminophen, which treats pain, but not inflammation. If you have chronic liver or kidney disease or ever had a stomach ulcer or GI bleeding, talk with your doctor before using these medicines.    Opioid pain medicine will only give temporary relief and does not treat the problem. If pain continues, you may need a shot of a steroid drug into your wrist.    If the above methods fail, you may need surgery. This will open the carpal tunnel and release the pressure on the trapped nerve.  Follow-up care  Follow up with your healthcare provider, or as advised, if the pain doesn t begin to improve within the next week.  If X-rays were taken, you will be notified of any new findings that may affect your care.  When to seek medical advice  Call your healthcare provider right away if any of these occur:    Pain not improving with the above treatment    Fingers or hand become cold, blue, numb, or tingly    Your whole arm becomes swollen or weak  Date Last Reviewed: 11/23/2015 2000-2017 [employer]. [address] [place], [place] [id number]. All rights reserved. This information is not intended as a substitute for professional medical care. Always follow your healthcare professional's instructions.                Follow-ups after your visit        Additional Services     ORTHO  REFERRAL       [facility] is referring you to the Orthopedic  Services at [facility].       The  Representative will assist you in the coordination of your Orthopedic and Musculoskeletal Care as prescribed by your physician.    The  Representative will call you within 1 business day to help schedule your appointment, or you may contact the  [facility] Representative at:    All areas ~ [phone]     Type of Referral : Surgical / Specialist       Timeframe requested: 3 - 5 days    Coverage of these services is subject to the terms and limitations of your health insurance plan.  Please call member services at your health plan with any benefit or coverage questions.      If X-rays, CT or MRI's have been performed, please contact the facility where they were done to arrange for , prior to your scheduled appointment.  Please bring this referral request to your appointment and present it to your specialist.                  Who to contact     If you have questions or need follow up information about today's clinic visit or your schedule please contact [facility] directly at [phone].  Normal or non-critical lab and imaging results will be communicated to you by MyChart, letter or phone within 4 business days after the clinic has received the results. If you do not hear from us within 7 days, please contact the clinic through MyChart or phone. If you have a critical or abnormal lab result, we will notify you by phone as soon as possible.  Submit refill requests through Check or call your pharmacy and they will forward the refill request to us. Please allow 3 business days for your refill to be completed.          Additional Information About Your Visit        Care EveryWhere ID     This is your Care EveryWhere ID. This could be used by other organizations to access your [facility] medical records  [id number]        Your Vitals Were     Pulse Temperature Respirations             78 98.4  F (36.9  C) (Oral) 18          Blood Pressure from Last 3 Encounters:   06/04/18 110/70   03/11/18 122/68   10/27/17 98/56    Weight from Last 3 Encounters:   03/11/18 220 lb (99.8 kg)   10/27/17 242 lb 4.8 oz (109.9 kg)   10/25/17 240 lb 3.2 oz (109 kg)              We Performed the Following     ORTHO  REFERRAL        Primary Care  Provider Office Phone # Fax #    Luis Fazal Crisostomo [phone] [address] [place] [address]        Equal Access to Services     JOVITA CASTELLANOS : Hadisamar smith camrono Sofabianaali, waaxda luqadaha, qaybta kaalmada janae, grant mtz tongshari gnadhi laRosaeusebio fine. So [facility] [phone].    ATENCIÓN: Si habla español, tiene a browne disposición servicios gratuitos de asistencia lingüística. Llame al [phone].    We comply with applicable federal civil rights laws and [place] laws. We do not discriminate on the basis of race, color, national origin, age, disability, sex, sexual orientation, or gender identity.            Thank you!     Thank you for choosing [facility]  for your care. Our goal is always to provide you with excellent care. Hearing back from our patients is one way we can continue to improve our services. Please take a few minutes to complete the written survey that you may receive in the mail after your visit with us. Thank you!             Your Updated Medication List - Protect others around you: Learn how to safely use, store and throw away your medicines at www.disposemymeds.org.          This list is accurate as of 6/4/18  2:50 PM.  Always use your most recent med list.                   Brand Name Dispense Instructions for use Diagnosis    lanolin ointment      Apply topically every hour as needed for dry skin (sore nipples)    NVD (normal vaginal delivery)       omeprazole 20 MG CR capsule    priLOSEC    90 capsule    Take 1 capsule (20 mg) by mouth daily    Encounter for supervision of other normal pregnancy in third trimester       PRENATAL VITAMINS PO      Take 1 tablet by mouth daily        vitamin D 2000 units tablet     60 tablet    Take 500 Units by mouth 2 times daily    Breast feeding status of mother

## 2018-06-04 NOTE — PATIENT INSTRUCTIONS
Carpal Tunnel Syndrome    Carpal tunnel syndrome is a painful condition of the wrist and arm. It is caused by pressure on the median nerve.  The median nerve is one of the nerves that give feeling and movement to the hand. It passes through a tunnel in the wrist called the carpal tunnel. This tunnel is made up of bones and ligaments. Narrowing of this tunnel or swelling of the tissues inside the tunnel puts pressure on the median nerve. This causes numbness, pins and needles, or electric shooting pains in your hand and forearm. Often the pain is worse at night and may wake you when you are asleep.  Carpal tunnel syndrome may occur during pregnancy and with use of birth control pills. It is more common in workers who must often bend their wrists. It is also common in people who work with power tools that cause strong vibrations.  Home care    Rest the painful wrist. Avoid repeated bending of the wrist back and forth. This puts pressure on the median nerve. Avoid using power tools with strong vibrations.    If you were given a splint, wear it at night while you sleep. You may also wear it during the day for comfort.    Move your fingers and wrists often to avoid stiffness.    Elevate your arms on pillows when you lie down.    Try using the unaffected hand more.    Try not to hold your wrists in a bent, downward position.    Sometimes changes in the work place may ease symptoms. If you type most of the day, it may help to change the position of your keyboard or add a wrist support. Your wrist should be in a neutral position and not bent back when typing.    You may use over-the-counter pain medicine to treat pain and inflammation, unless another medicine was prescribed. Anti-inflammatory pain medicines, such as ibuprofen or naproxen may be more effective than acetaminophen, which treats pain, but not inflammation. If you have chronic liver or kidney disease or ever had a stomach ulcer or GI bleeding, talk with your  doctor before using these medicines.    Opioid pain medicine will only give temporary relief and does not treat the problem. If pain continues, you may need a shot of a steroid drug into your wrist.    If the above methods fail, you may need surgery. This will open the carpal tunnel and release the pressure on the trapped nerve.  Follow-up care  Follow up with your healthcare provider, or as advised, if the pain doesn t begin to improve within the next week.  If X-rays were taken, you will be notified of any new findings that may affect your care.  When to seek medical advice  Call your healthcare provider right away if any of these occur:    Pain not improving with the above treatment    Fingers or hand become cold, blue, numb, or tingly    Your whole arm becomes swollen or weak  Date Last Reviewed: 11/23/2015 2000-2017 [employer]. [address] [place] [address], [place] [address]. All rights reserved. This information is not intended as a substitute for professional medical care. Always follow your healthcare professional's instructions.

## 2018-06-15 ENCOUNTER — RADIANT APPOINTMENT (OUTPATIENT)
Dept: GENERAL RADIOLOGY | Facility: CLINIC | Age: 28
End: 2018-06-15
Attending: ORTHOPAEDIC SURGERY
Payer: MEDICAID

## 2018-06-15 ENCOUNTER — OFFICE VISIT (OUTPATIENT)
Dept: ORTHOPEDICS | Facility: CLINIC | Age: 28
End: 2018-06-15
Payer: MEDICAID

## 2018-06-15 VITALS
HEIGHT: 68 IN | DIASTOLIC BLOOD PRESSURE: 62 MMHG | SYSTOLIC BLOOD PRESSURE: 94 MMHG | WEIGHT: 255.5 LBS | TEMPERATURE: 98.2 F | BODY MASS INDEX: 38.72 KG/M2

## 2018-06-15 DIAGNOSIS — M25.531 PAIN IN BOTH WRISTS: ICD-10-CM

## 2018-06-15 DIAGNOSIS — G56.03 BILATERAL CARPAL TUNNEL SYNDROME: Primary | ICD-10-CM

## 2018-06-15 DIAGNOSIS — G56.23 CUBITAL TUNNEL SYNDROME OF BOTH UPPER EXTREMITIES: ICD-10-CM

## 2018-06-15 DIAGNOSIS — M25.532 PAIN IN BOTH WRISTS: ICD-10-CM

## 2018-06-15 PROCEDURE — 73110 X-RAY EXAM OF WRIST: CPT | Mod: TC

## 2018-06-15 PROCEDURE — 99243 OFF/OP CNSLTJ NEW/EST LOW 30: CPT | Performed by: ORTHOPAEDIC SURGERY

## 2018-06-15 ASSESSMENT — PAIN SCALES - GENERAL: PAINLEVEL: EXTREME PAIN (9)

## 2018-06-15 NOTE — PROGRESS NOTES
ORTHOPEDIC CONSULT      Chief Complaint: Valarie Talavera is a 27 year old female who is being seen for Chief Complaint   Patient presents with     Musculoskeletal Problem     bilateral wrist pain     Consult     ref: Fabi Dhaliwal NP       History of Present Illness:   Valarie Talavera is a right hand dominant 27 year old female who is seen in consultation at the request of Fabi Dhaliwal CNP for evaluation of bilateral wrist pain.  Mechanism of Injury: No trauma or inciting event. States has bilateral wrist to hand pain off and on especially with activities like taking care of her children, driving the car, and writing for quite some time. R much more symptomatic than left.  Possible some minor numbness/tingling into the entire bilateral hand for quite some time. However last 3-4 weeks the symptoms have become much worse and now having constant numbness in all five fingers on right hand worst with thumb, index and middle along with shooting/burning type pain and occasional burning and numbness on left hand worst with little finger, ring finger.  States ADLS, lifting children, working at , and at night along with driving making it worse.  Has tried splinting and bracing during the day, and at night, has tried compression and splinting at night, and this seems to be making symptoms worse.  Patient also states her hands will get some swelling every once in awhile as well.  No recent trauma.  Denies any shooting pains from neck to hand, any numbness/tingling that radiates from neck or shoulder to hand.  States all the symptoms are wrist distal to fingers.  Notes she will drop objects occasionally and feels getting weaker in the hand.         Patient's past medical, surgical, social and family histories reviewed.     Past Medical History:   Diagnosis Date     Anxiety      Depression with anxiety      Depressive disorder      HSIL on Pap smear of cervix 2/2/2016    colp scheduled 2/11/16     Low blood pressure       "Vitamin D deficiency        Past Surgical History:   Procedure Laterality Date     NO HISTORY OF SURGERY         Medications:    Current Outpatient Prescriptions on File Prior to Visit:  Cholecalciferol (VITAMIN D) 2000 UNITS tablet Take 500 Units by mouth 2 times daily   omeprazole (PRILOSEC) 20 MG CR capsule Take 1 capsule (20 mg) by mouth daily   lanolin ointment Apply topically every hour as needed for dry skin (sore nipples)   Prenatal Multivit-Min-Fe-FA (PRENATAL VITAMINS PO) Take 1 tablet by mouth daily     No current facility-administered medications on file prior to visit.     Allergies   Allergen Reactions     Zithromax [Azithromycin] Other (See Comments)     hives       Social History     Occupational History      [employer]     Social History Main Topics     Smoking status: Current Some Day Smoker     Types: Cigarettes     Smokeless tobacco: Never Used     Alcohol use No      Comment: Occasionally     Drug use: No     Sexual activity: Yes     Partners: Male       Family History   Problem Relation Age of Onset     Hypertension No family hx of      CEREBROVASCULAR DISEASE No family hx of      Hyperlipidemia No family hx of        REVIEW OF SYSTEMS  10 point review systems performed otherwise negative as noted as per history of present illness.    Physical Exam:  Vitals: BP 94/62 (BP Location: Right arm, Patient Position: Chair, Cuff Size: Adult Large)  Temp 98.2  F (36.8  C) (Temporal)  Ht 1.727 m (5' 8\")  Wt 115.9 kg (255 lb 8 oz)  BMI 38.85 kg/m2  BMI= Body mass index is 38.85 kg/(m^2).  Constitutional: healthy, alert and no acute distress   Psychiatric: mentation appears normal and affect normal/bright  NEURO: no focal deficits  RESP: Normal with easy respirations and no use of accessory muscles to breathe, no audible wheezing or retractions  CV: bilateral upper extemity:  no edema         Regular rate and rhythm by palpation  SKIN: No erythema, rashes, excoriation, or breakdown. No evidence of " infection.   JOINT/EXTREMITIES:right wrist and hand: no bruising, swelling, deformity. No atrophy or wasting.  Decreased  strength, decreased thumb strength.  Full ROM to fingers, wrist.  Exquisitely painful response to Tinel's at wrist and elbow, along with Phalen's.  Decreased sensation to distal digits on all five fingers greatest at thumb, index, middle.  Full ROM to elbow.  Cap refill <2, skin pink warm dry.     left wrist and hand: no bruising, swelling, deformity. No atrophy or wasting.  normal  strength, normal thumb strength.  Full ROM to fingers, wrist.  No response with Tinel's at wrist or elbow. +Phalen's. Mildly Decreased sensation to distal digits with ring, little finger.  Full ROM to elbow.  Cap refill <2, skin pink warm dry.     Lymph: no appreciated bilateral upper extremity lymphedema     Diagnostic Modalities:  bilateral wrist X-ray: No fracture, dislocation and or lesions. Normal alignment.  Independent visualization of the images was performed.      Impression: bilateral carpal tunnel syndrome R > L  Bilateral cubital tunnel syndrome R > L    Plan:  All of the above pertinent physical exam and imaging modalities findings was reviewed with Valarie.  With constant numbness and tingling for the last 3-4 weeks on the right side, recommended bilateral EMGs, return to clinic.  Likely surgical discussion at that time pending on EMG results.           Return to clinic to discuss test results, or sooner as needed for changes.  Re-x-ray on return: No    Scribed by:  Soham Sauer, APRN, CNP, DNP  5:23 PM  6/15/2018    I attest I have seen and evaluated the patient.  I agree with above impression and plan.  Tate Moe D.O.

## 2018-06-15 NOTE — LETTER
6/15/2018         RE: Valarie Talavera  [address] [facility] [address]        Dear Colleague,    Thank you for referring your patient, Valarie Talavera, to the [facility]. Please see a copy of my visit note below.    ORTHOPEDIC CONSULT      Chief Complaint: Valarie Talavera is a 27 year old female who is being seen for Chief Complaint   Patient presents with     Musculoskeletal Problem     bilateral wrist pain     Consult     ref: Fabi Dhaliwal NP       History of Present Illness:   Valarie Talavera is a right hand dominant 27 year old female who is seen in consultation at the request of Fabi Dhaliwal CNP for evaluation of bilateral wrist pain.  Mechanism of Injury: No trauma or inciting event. States has bilateral wrist to hand pain off and on especially with activities like taking care of her children, driving the car, and writing for quite some time. R much more symptomatic than left.  Possible some minor numbness/tingling into the entire bilateral hand for quite some time. However last 3-4 weeks the symptoms have become much worse and now having constant numbness in all five fingers on right hand worst with thumb, index and middle along with shooting/burning type pain and occasional burning and numbness on left hand worst with little finger, ring finger.  States ADLS, lifting children, working at , and at night along with driving making it worse.  Has tried splinting and bracing during the day, and at night, has tried compression and splinting at night, and this seems to be making symptoms worse.  Patient also states her hands will get some swelling every once in awhile as well.  No recent trauma.  Denies any shooting pains from neck to hand, any numbness/tingling that radiates from neck or shoulder to hand.  States all the symptoms are wrist distal to fingers.  Notes she will drop objects occasionally and feels getting weaker in the hand.         Patient's past medical, surgical, social  "and family histories reviewed.     Past Medical History:   Diagnosis Date     Anxiety      Depression with anxiety      Depressive disorder      HSIL on Pap smear of cervix 2/2/2016    colp scheduled 2/11/16     Low blood pressure      Vitamin D deficiency        Past Surgical History:   Procedure Laterality Date     NO HISTORY OF SURGERY         Medications:    Current Outpatient Prescriptions on File Prior to Visit:  Cholecalciferol (VITAMIN D) 2000 UNITS tablet Take 500 Units by mouth 2 times daily   omeprazole (PRILOSEC) 20 MG CR capsule Take 1 capsule (20 mg) by mouth daily   lanolin ointment Apply topically every hour as needed for dry skin (sore nipples)   Prenatal Multivit-Min-Fe-FA (PRENATAL VITAMINS PO) Take 1 tablet by mouth daily     No current facility-administered medications on file prior to visit.     Allergies   Allergen Reactions     Zithromax [Azithromycin] Other (See Comments)     hives       Social History     Occupational History      [employer]     Social History Main Topics     Smoking status: Current Some Day Smoker     Types: Cigarettes     Smokeless tobacco: Never Used     Alcohol use No      Comment: Occasionally     Drug use: No     Sexual activity: Yes     Partners: Male       Family History   Problem Relation Age of Onset     Hypertension No family hx of      CEREBROVASCULAR DISEASE No family hx of      Hyperlipidemia No family hx of        REVIEW OF SYSTEMS  10 point review systems performed otherwise negative as noted as per history of present illness.    Physical Exam:  Vitals: BP 94/62 (BP Location: Right arm, Patient Position: Chair, Cuff Size: Adult Large)  Temp 98.2  F (36.8  C) (Temporal)  Ht 1.727 m (5' 8\")  Wt 115.9 kg (255 lb 8 oz)  BMI 38.85 kg/m2  BMI= Body mass index is 38.85 kg/(m^2).  Constitutional: healthy, alert and no acute distress   Psychiatric: mentation appears normal and affect normal/bright  NEURO: no focal deficits  RESP: Normal with easy respirations and " no use of accessory muscles to breathe, no audible wheezing or retractions  CV: bilateral upper extemity:  no edema         Regular rate and rhythm by palpation  SKIN: No erythema, rashes, excoriation, or breakdown. No evidence of infection.   JOINT/EXTREMITIES:right wrist and hand: no bruising, swelling, deformity. No atrophy or wasting.  Decreased  strength, decreased thumb strength.  Full ROM to fingers, wrist.  Exquisitely painful response to Tinel's at wrist and elbow, along with Phalen's.  Decreased sensation to distal digits on all five fingers greatest at thumb, index, middle.  Full ROM to elbow.  Cap refill <2, skin pink warm dry.     left wrist and hand: no bruising, swelling, deformity. No atrophy or wasting.  normal  strength, normal thumb strength.  Full ROM to fingers, wrist.  No response with Tinel's at wrist or elbow. +Phalen's. Mildly Decreased sensation to distal digits with ring, little finger.  Full ROM to elbow.  Cap refill <2, skin pink warm dry.     Lymph: no appreciated bilateral upper extremity lymphedema     Diagnostic Modalities:  bilateral wrist X-ray: No fracture, dislocation and or lesions. Normal alignment.  Independent visualization of the images was performed.      Impression: bilateral carpal tunnel syndrome R > L  Bilateral cubital tunnel syndrome R > L    Plan:  All of the above pertinent physical exam and imaging modalities findings was reviewed with Valarie.  With constant numbness and tingling for the last 3-4 weeks on the right side, recommended bilateral EMGs, return to clinic.  Likely surgical discussion at that time pending on EMG results.           Return to clinic to discuss test results, or sooner as needed for changes.  Re-x-ray on return: No    Scribed by:  Soham Sauer, APRN, CNP, DNP  5:23 PM  6/15/2018    I attest I have seen and evaluated the patient.  I agree with above impression and plan.  Tate Moe D.O.    Again, thank you for allowing me to participate  in the care of your patient.        Sincerely,        Enzo Moe, DO

## 2018-06-15 NOTE — MR AVS SNAPSHOT
"              After Visit Summary   6/15/2018    Valarie Talavera    MRN: [id number]           Patient Information     Date Of Birth          1990        Visit Information        Provider Department      6/15/2018 3:40 PM Enzo Moe,  [facility]        Today's Diagnoses     Pain in both wrists    -  1       Follow-ups after your visit        Who to contact     If you have questions or need follow up information about today's clinic visit or your schedule please contact [facility] directly at [phone].  Normal or non-critical lab and imaging results will be communicated to you by MyChart, letter or phone within 4 business days after the clinic has received the results. If you do not hear from us within 7 days, please contact the clinic through MyChart or phone. If you have a critical or abnormal lab result, we will notify you by phone as soon as possible.  Submit refill requests through [employer] or call your pharmacy and they will forward the refill request to us. Please allow 3 business days for your refill to be completed.          Additional Information About Your Visit        Care EveryWhere ID     This is your Care EveryWhere ID. This could be used by other organizations to access your [facility] medical records  [id number]        Your Vitals Were     Temperature Height BMI (Body Mass Index)             98.2  F (36.8  C) (Temporal) 1.727 m (5' 8\") 38.85 kg/m2          Blood Pressure from Last 3 Encounters:   06/15/18 94/62   06/04/18 110/70   03/11/18 122/68    Weight from Last 3 Encounters:   06/15/18 115.9 kg (255 lb 8 oz)   03/11/18 99.8 kg (220 lb)   10/27/17 109.9 kg (242 lb 4.8 oz)               Primary Care Provider Office Phone # Fax #    Luis Crisostomo [phone] [address] [place] [address]        Equal Access to Services     JOVITA CASTELLANOS AH: Kevin lyono Sobryant, waaxda luqadaha, qaybta kaalmada " grant cardososaryrosenda la'aan ah. Karli [facility] [phone].    ATENCIÓN: Si habla israel, tiene a browne disposición servicios gratuitos de asistencia lingüística. Espinoza al [phone].    We comply with applicable federal civil rights laws and [place] laws. We do not discriminate on the basis of race, color, national origin, age, disability, sex, sexual orientation, or gender identity.            Thank you!     Thank you for choosing [facility]  for your care. Our goal is always to provide you with excellent care. Hearing back from our patients is one way we can continue to improve our services. Please take a few minutes to complete the written survey that you may receive in the mail after your visit with us. Thank you!             Your Updated Medication List - Protect others around you: Learn how to safely use, store and throw away your medicines at www.disposemymeds.org.          This list is accurate as of 6/15/18  3:46 PM.  Always use your most recent med list.                   Brand Name Dispense Instructions for use Diagnosis    lanolin ointment      Apply topically every hour as needed for dry skin (sore nipples)    NVD (normal vaginal delivery)       omeprazole 20 MG CR capsule    priLOSEC    90 capsule    Take 1 capsule (20 mg) by mouth daily    Encounter for supervision of other normal pregnancy in third trimester       PRENATAL VITAMINS PO      Take 1 tablet by mouth daily        vitamin D 2000 units tablet     60 tablet    Take 500 Units by mouth 2 times daily    Breast feeding status of mother

## 2018-08-12 ENCOUNTER — HEALTH MAINTENANCE LETTER (OUTPATIENT)
Age: 28
End: 2018-08-12

## 2018-09-11 ENCOUNTER — HOSPITAL ENCOUNTER (EMERGENCY)
Facility: CLINIC | Age: 28
Discharge: HOME | End: 2018-09-11
Attending: FAMILY MEDICINE | Admitting: FAMILY MEDICINE
Payer: MEDICAID

## 2018-09-11 ENCOUNTER — TELEPHONE (OUTPATIENT)
Dept: FAMILY MEDICINE | Facility: OTHER | Age: 28
End: 2018-09-11

## 2018-09-11 VITALS
RESPIRATION RATE: 16 BRPM | TEMPERATURE: 98.1 F | OXYGEN SATURATION: 98 % | SYSTOLIC BLOOD PRESSURE: 135 MMHG | HEIGHT: 68 IN | DIASTOLIC BLOOD PRESSURE: 93 MMHG | WEIGHT: 250 LBS | BODY MASS INDEX: 37.89 KG/M2

## 2018-09-11 DIAGNOSIS — M62.830 SPASM OF LUMBAR PARASPINOUS MUSCLE: ICD-10-CM

## 2018-09-11 PROCEDURE — 99284 EMERGENCY DEPT VISIT MOD MDM: CPT | Mod: Z6 | Performed by: FAMILY MEDICINE

## 2018-09-11 PROCEDURE — 99282 EMERGENCY DEPT VISIT SF MDM: CPT | Performed by: FAMILY MEDICINE

## 2018-09-11 RX ORDER — CYCLOBENZAPRINE HCL 5 MG
5 TABLET ORAL 3 TIMES DAILY PRN
Qty: 12 TABLET | Refills: 0 | Status: SHIPPED | OUTPATIENT
Start: 2018-09-11 | End: 2019-01-29

## 2018-09-11 RX ORDER — OXYCODONE HYDROCHLORIDE 5 MG/1
5 TABLET ORAL EVERY 6 HOURS PRN
Qty: 6 TABLET | Refills: 0 | Status: SHIPPED | OUTPATIENT
Start: 2018-09-11 | End: 2019-01-29

## 2018-09-11 RX ORDER — MELOXICAM 15 MG/1
15 TABLET ORAL DAILY
Qty: 10 TABLET | Refills: 0 | Status: SHIPPED | OUTPATIENT
Start: 2018-09-11 | End: 2019-01-29

## 2018-09-11 NOTE — LETTER
2018      [facility]  Emergency Room  [address] [place]   [address]  Tel: [phone]   Fax: [phone]  2018    Valarie Talavera  [address]  [facility] [address]  [phone] (home)     : 1990          To Whom it May Concern:    Valarie Talavera was seen in our ER today 2018. I expect her condition to improve over the next 2-3 days.  She may return to work/school, without restriction, when improved. If not improved during the above expected time period,  then I have encouraged her to be rechecked in her clinic for further evaluation.      Please contact me for questions or concerns.    Sincerely,      Marlo Alanis  Physician  [facility] Emergency Room

## 2018-09-11 NOTE — ED AVS SNAPSHOT
[facility] Emergency Department    [address] [place] [address]    Phone:  [phone]    Fax:  [phone]                                       Valarie Talavera   MRN: [id number]    Department:  [facility] Emergency Department   Date of Visit:  9/11/2018           After Visit Summary Signature Page     I have received my discharge instructions, and my questions have been answered. I have discussed any challenges I see with this plan with the nurse or doctor.    ..........................................................................................................................................  Patient/Patient Representative Signature      ..........................................................................................................................................  Patient Representative Print Name and Relationship to Patient    ..................................................               ................................................  Date                                            Time    ..........................................................................................................................................  Reviewed by Signature/Title    ...................................................              ..............................................  Date                                                            Time          22EPIC Rev 08/18

## 2018-09-11 NOTE — ED AVS SNAPSHOT
[facility] Emergency Department    [address] [place] [address]    Phone:  [phone]    Fax:  [phone]                                       Valarie Talavera   MRN: [id number]    Department:  [facility] Emergency Department   Date of Visit:  9/11/2018           Patient Information     Date Of Birth          1990        Your diagnoses for this visit were:     Spasm of lumbar paraspinous muscle right sided       You were seen by Malro Alanis DO.      Follow-up Information     Follow up with Luis Crisostomo MD.    Specialty:  Family Practice    Why:  if not improved in 7-10 days    Contact information:    [address] [place] [address] [phone]          Follow up with [facility] Emergency Department.    Specialty:  EMERGENCY MEDICINE    Why:  If symptoms worsen    Contact information:    [address] [place] [id number] [phone]    Additional information:    From [address]: Exit at [address] on south side of [place]. Turn right on [address]. Turn left at stoplight on [address]. [facility] will be in view two blocks ahead        Discharge Instructions       Please read and follow the handout(s) instructions. Return, if needed, for increased or worsening symptoms and as directed by the handout(s).    I sent your new script(s) to the [facility] pharmacy.    See the note for work.      I hope you feel better soon!        Discharge References/Attachments     BACK, RELIEVING TENSION IN YOUR (ENGLISH)    BACK SPASM, NO TRAUMA (ENGLISH)      24 Hour Appointment Hotline       To make an appointment at any [facility] clinic, call [phone] ([phone]). If you don't have a family doctor or clinic, we will help you find one. [facility] clinics are conveniently located to serve the needs of you and your family.             Review of your medicines      START taking        Dose / Directions  Last dose taken    cyclobenzaprine 5 MG tablet   Commonly known as:  FLEXERIL   Dose:  5 mg   Quantity:  12 tablet        Take 1 tablet (5 mg) by mouth 3 times daily as needed for muscle spasms   Refills:  0        meloxicam 15 MG tablet   Commonly known as:  MOBIC   Dose:  15 mg   Quantity:  10 tablet        Take 1 tablet (15 mg) by mouth daily for 10 days TAKE WITH FOOD AS NEEDED FOR PAIN. WEAN OFF OF THE MEDICATIONS AS YOUR SYMPTOMS IMPROVE.   Refills:  0        oxyCODONE IR 5 MG tablet   Commonly known as:  ROXICODONE   Dose:  5 mg   Quantity:  6 tablet        Take 1 tablet (5 mg) by mouth every 6 hours as needed for pain   Refills:  0          Our records show that you are taking the medicines listed below. If these are incorrect, please call your family doctor or clinic.        Dose / Directions Last dose taken    IBUPROFEN PO   Dose:  800 mg        Take 800 mg by mouth   Refills:  0        lanolin ointment        Apply topically every hour as needed for dry skin (sore nipples)   Refills:  0        omeprazole 20 MG CR capsule   Commonly known as:  priLOSEC   Dose:  20 mg   Quantity:  90 capsule        Take 1 capsule (20 mg) by mouth daily   Refills:  1        PRENATAL VITAMINS PO   Dose:  1 tablet        Take 1 tablet by mouth daily   Refills:  0        vitamin D 2000 units tablet   Dose:  500 Units   Quantity:  60 tablet        Take 500 Units by mouth 2 times daily   Refills:  3                Information about OPIOIDS     PRESCRIPTION OPIOIDS: WHAT YOU NEED TO KNOW   We gave you an opioid (narcotic) pain medicine. It is important to manage your pain, but opioids are not always the best choice. You should first try all the other options your care team gave you. Take this medicine for as short a time (and as few doses) as possible.    Some activities can increase your pain, such as bandage changes or therapy sessions. It may help to take your pain medicine 30 to 60 minutes before these activities. Reduce your  stress by getting enough sleep, working on hobbies you enjoy and practicing relaxation or meditation. Talk to your care team about ways to manage your pain beyond prescription opioids.    These medicines have risks:    DO NOT drive when on new or higher doses of pain medicine. These medicines can affect your alertness and reaction times, and you could be arrested for driving under the influence (DUI). If you need to use opioids long-term, talk to your care team about driving.    DO NOT operate heavy machinery    DO NOT do any other dangerous activities while taking these medicines.    DO NOT drink any alcohol while taking these medicines.     If the opioid prescribed includes acetaminophen, DO NOT take with any other medicines that contain acetaminophen. Read all labels carefully. Look for the word  acetaminophen  or  Tylenol.  Ask your pharmacist if you have questions or are unsure.    You can get addicted to pain medicines, especially if you have a history of addiction (chemical, alcohol or substance dependence). Talk to your care team about ways to reduce this risk.    All opioids tend to cause constipation. Drink plenty of water and eat foods that have a lot of fiber, such as fruits, vegetables, prune juice, apple juice and high-fiber cereal. Take a laxative (Miralax, milk of magnesia, Colace, Senna) if you don t move your bowels at least every other day. Other side effects include upset stomach, sleepiness, dizziness, throwing up, tolerance (needing more of the medicine to have the same effect), physical dependence and slowed breathing.    Store your pills in a secure place, locked if possible. We will not replace any lost or stolen medicine. If you don t finish your medicine, please throw away (dispose) as directed by your pharmacist. The [place] Pollution Control [employer] has more information about safe disposal: https://www.pca.[facility].[place].us/living-green/managing-unwanted-medications        Prescriptions were  "sent or printed at these locations (3 Prescriptions)                   [facility], [place] - [address] [place] [address]    [address] [place] [address] , [place] [address]    Telephone:  [phone]   Fax:  [phone]   Hours:                  E-Prescribed (2 of 3)         cyclobenzaprine (FLEXERIL) 5 MG tablet               meloxicam (MOBIC) 15 MG tablet                 Printed at Department/Unit printer (1 of 3)         oxyCODONE IR (ROXICODONE) 5 MG tablet                Orders Needing Specimen Collection     None      Pending Results     No orders found from 9/9/2018 to 9/12/2018.            Pending Culture Results     No orders found from 9/9/2018 to 9/12/2018.            Pending Results Instructions     If you had any lab results that were not finalized at the time of your Discharge, you can call the ED Lab Result RN at [phone]. You will be contacted by this team for any positive Lab results or changes in treatment. The nurses are available 7 days a week from 10A to 6:30P.  You can leave a message 24 hours per day and they will return your call.        Thank you for choosing [facility]       Thank you for choosing [facility] for your care. Our goal is always to provide you with excellent care. Hearing back from our patients is one way we can continue to improve our services. Please take a few minutes to complete the written survey that you may receive in the mail after you visit with us. Thank you!        [employer] Information     [employer] lets you send messages to your doctor, view your test results, renew your prescriptions, schedule appointments and more. To sign up, go to [email or web address] . Click on \"Log in\" on the left side of the screen, which will take you to the Welcome page. Then click on \"Sign up Now\" on the right side of the page.     You will be asked to enter the access code listed below, as well as some personal information. Please follow the directions to create your username and " password.     Your access code is: [email or web address]  Expires: 12/10/2018  9:07 AM     Your access code will  in 90 days. If you need help or a new code, please call your [facility] or [phone].        Care EveryWhere ID     This is your Care EveryWhere ID. This could be used by other organizations to access your [place] medical records  [id number]        Equal Access to Services     JOVITA CASTELLANOS : Hadisamar lyono Sobryant, waaxda luqadaha, qaybta kaalmada adeegyada, grant burleson . So [facility] [phone].    ATENCIÓN: Si habla español, tiene a browne disposición servicios gratuitos de asistencia lingüística. Llame al [phone].    We comply with applicable federal civil rights laws and [place] laws. We do not discriminate on the basis of race, color, national origin, age, disability, sex, sexual orientation, or gender identity.            After Visit Summary       This is your record. Keep this with you and show to your community pharmacist(s) and doctor(s) at your next visit.

## 2018-09-11 NOTE — DISCHARGE INSTRUCTIONS
Please read and follow the handout(s) instructions. Return, if needed, for increased or worsening symptoms and as directed by the handout(s).    I sent your new script(s) to the [facility] pharmacy.    See the note for work.      I hope you feel better soon!

## 2018-09-12 ASSESSMENT — ENCOUNTER SYMPTOMS
FEVER: 0
WEAKNESS: 0
DIFFICULTY URINATING: 0
CHILLS: 0
BACK PAIN: 1
NUMBNESS: 1

## 2018-09-12 NOTE — ED PROVIDER NOTES
History     Chief Complaint   Patient presents with     Back Pain     HPI  Valarie Talavera is a 27 year old female who resented to the emergency room with her family secondary to concerns of right-sided low back pain is been present on and off for last week but yesterday started feeling pain rating down her left leg to the toes.  Patient states that she has had a history for back problems in the past.  She denies any incontinence of bowel or bladder more than is usual for her after her 2 pregnancies.  She denies any fever or chills.  She denies any specific fall or injury as a reason for the back pain.  She denies significant weakness into the lower legs although states her legs give out from under her when the pain gets severe.  She denies any toe drop or dragging of her toes when walking.    Problem List:    Patient Active Problem List    Diagnosis Date Noted     Bilateral carpal tunnel syndrome 06/15/2018     Priority: Medium     Cubital tunnel syndrome of both upper extremities 06/15/2018     Priority: Medium     Term pregnancy 10/14/2017     Priority: Medium     Gastroesophageal reflux disease with esophagitis 10/04/2017     Priority: Medium     Back pain 08/23/2017     Priority: Medium     Hyperglycemia 07/13/2017     Priority: Medium     Nuchal cord, not applicable or unspecified fetus 06/23/2017     Priority: Medium     Encounter for supervision of other normal pregnancy in third trimester 03/11/2017     Priority: Medium     Vitamin D deficiency 02/02/2016     Priority: Medium     HSIL on Pap smear of cervix 02/02/2016     Priority: Medium     2/2/16: Pap - HSIL. Plan colp  2/11/16 [place].  HSIL. Age 25  Plan: per tele enc on 2/18/16 [place] and cytology in August.  Will have pathologist differentiate between WESLY 2 and 3 if possible.  If lesion is worse, then LEEP.  If it is the same or better, then colp and cytology in 6 months.    8/30/16 HSIL pap/+ HR HPV (not 16 or 18). [place] not completed.  Per Dr. Luna,  "have pt. Schedule colp bef 11/30/16.  9/9/16 Pt. Advised.  9/22/16 [place]. NO SHOW.  10/6/16 [place] visit. NO SHOW.  12/8/16 Reminder letter.   1/11/17 [place] not done. Tracking updated for 6 mo colp/pap.   3/10/17 ASCUS pap/+ HR HPV (not 16 or 18). approx 9 weeks pregnant. Plan: colp around 20 weeks gestation, approx 5/26/17  10/14/17 delivered baby. (6 wk pp due 11/25/17)          Past Medical History:    Past Medical History:   Diagnosis Date     Anxiety      Depression with anxiety      Depressive disorder      HSIL on Pap smear of cervix 2/2/2016     Low blood pressure      Vitamin D deficiency        Past Surgical History:    Past Surgical History:   Procedure Laterality Date     NO HISTORY OF SURGERY         Family History:    Family History   Problem Relation Age of Onset     Hypertension No family hx of      Cerebrovascular Disease No family hx of      Hyperlipidemia No family hx of        Social History:  Marital Status:  Single [1]  Social History   Substance Use Topics     Smoking status: Current Some Day Smoker     Types: Cigarettes     Smokeless tobacco: Never Used     Alcohol use No      Comment: Occasionally        Medications:      cyclobenzaprine (FLEXERIL) 5 MG tablet   IBUPROFEN PO   meloxicam (MOBIC) 15 MG tablet   oxyCODONE IR (ROXICODONE) 5 MG tablet   Cholecalciferol (VITAMIN D) 2000 UNITS tablet   lanolin ointment   omeprazole (PRILOSEC) 20 MG CR capsule   Prenatal Multivit-Min-Fe-FA (PRENATAL VITAMINS PO)         Review of Systems   Constitutional: Negative for chills and fever.   Genitourinary: Negative for difficulty urinating.   Musculoskeletal: Positive for back pain (right low back).   Neurological: Positive for numbness (left leg). Negative for weakness.   All other systems reviewed and are negative.      Physical Exam   BP: (!) 135/93  Heart Rate: 112  Temp: 98.1  F (36.7  C)  Resp: 16  Height: 172.7 cm (5' 8\")  Weight: 113.4 kg (250 lb)  SpO2: 98 %      Physical Exam   Constitutional: She " is oriented to person, place, and time. She appears well-developed and well-nourished. She appears distressed (mild with movement from exam bed to standing position.).   HENT:   Head: Normocephalic and atraumatic.   Eyes: Conjunctivae and EOM are normal.   Neck: Normal range of motion. Neck supple.   Pulmonary/Chest: Effort normal. No respiratory distress.   Musculoskeletal:        Lumbar back: She exhibits decreased range of motion, tenderness, pain and spasm. She exhibits no bony tenderness, no swelling, no deformity, no laceration and normal pulse.        Back:    Neurological: She is alert and oriented to person, place, and time. She has normal strength and normal reflexes. No sensory deficit. She exhibits normal muscle tone. She displays a negative Romberg sign.   Reflex Scores:       Patellar reflexes are 2+ on the right side and 2+ on the left side.       Achilles reflexes are 2+ on the right side and 2+ on the left side.  Skin: Skin is warm. No rash noted. Rash is not vesicular.   Nursing note and vitals reviewed.      ED Course     ED Course     Procedures               Critical Care time:  none                 Assessments & Plan (with Medical Decision Making)  Patient with history and exam findings consistent with acute lumbar spasm with resultant radicular symptoms down the left leg. The patient has no concerning features to her back pain, and she has no risk factors to suggest a concerning etiology for her back pain.  She is completely neurologically intact in upper and lower extremities. There is no evidence or risk factors for epidural abscess, epidural hematoma, pathologic fracture, cauda equina syndrome, etc. I advised that the patient follow up with her primary care physician in one week or so if her symptoms have not improved.  We discussed the use of ice therapy to the low back with gentle stretching and massage.  Encouraged her to walk or swim on a daily basis to avoid long periods of sitting or  standing.  Medication was prescribed as listed below.       I have reviewed the nursing notes.    I have reviewed the findings, diagnosis, plan and need for follow up with the patient.       Discharge Medication List as of 9/11/2018  9:07 AM      START taking these medications    Details   cyclobenzaprine (FLEXERIL) 5 MG tablet Take 1 tablet (5 mg) by mouth 3 times daily as needed for muscle spasms, Disp-12 tablet, R-0, E-Prescribe      meloxicam (MOBIC) 15 MG tablet Take 1 tablet (15 mg) by mouth daily for 10 days TAKE WITH FOOD AS NEEDED FOR PAIN. WEAN OFF OF THE MEDICATIONS AS YOUR SYMPTOMS IMPROVE., Disp-10 tablet, R-0, E-Prescribe      oxyCODONE IR (ROXICODONE) 5 MG tablet Take 1 tablet (5 mg) by mouth every 6 hours as needed for pain, Disp-6 tablet, R-0, Local Print                  I verbally discussed the findings of the evaluation today in the ER. I have verbally discussed with Valarie the suggested treatment(s) as described in the discharge instructions and handouts. I have prescribed the above listed medications and instructed her on appropriate use of these medications.      I have verbally suggested she follow-up in her clinic or return to the ER for increased symptoms. See the follow-up recommendations documented  in the after visit summary in this visit's EPIC chart.      Final diagnoses:   Spasm of lumbar paraspinous muscle - right sided       9/11/2018   [facility] EMERGENCY DEPARTMENT     Marlo Alanis,   09/12/18 [id number]

## 2018-09-12 NOTE — TELEPHONE ENCOUNTER
Left message for pt to return call, when call is returned give information below and help schedule physical and pap.      Sally Shaikh CMA ([facility])

## 2018-09-13 ENCOUNTER — TELEPHONE (OUTPATIENT)
Dept: ORTHOPEDICS | Facility: CLINIC | Age: 28
End: 2018-09-13

## 2018-09-13 NOTE — TELEPHONE ENCOUNTER
Reason for Call:  Other appointment    Detailed comments: [facility] faxed us to tell us that they've been unable to get a hold of patient to schedule EMG     Phone Number Patient can be reached at: Home number on file [phone] (home)    Best Time: any    Can we leave a detailed message on this number? YES    Call taken on 9/13/2018 at 1:14 PM by Mahnaz Uriarte

## 2018-09-13 NOTE — TELEPHONE ENCOUNTER
Left message for patient to call back. Please relay information below and help get scheduled.  [place] VALENCIA CMA ([facility])

## 2018-11-18 ENCOUNTER — HEALTH MAINTENANCE LETTER (OUTPATIENT)
Age: 28
End: 2018-11-18

## 2018-12-26 ENCOUNTER — HOSPITAL ENCOUNTER (EMERGENCY)
Facility: CLINIC | Age: 28
Discharge: HOME | End: 2018-12-26
Attending: FAMILY MEDICINE | Admitting: FAMILY MEDICINE
Payer: MEDICAID

## 2018-12-26 VITALS
OXYGEN SATURATION: 97 % | WEIGHT: 256.6 LBS | TEMPERATURE: 97.3 F | RESPIRATION RATE: 20 BRPM | BODY MASS INDEX: 39.02 KG/M2 | DIASTOLIC BLOOD PRESSURE: 75 MMHG | SYSTOLIC BLOOD PRESSURE: 123 MMHG

## 2018-12-26 DIAGNOSIS — R11.2 NON-INTRACTABLE VOMITING WITH NAUSEA, UNSPECIFIED VOMITING TYPE: ICD-10-CM

## 2018-12-26 DIAGNOSIS — R19.7 DIARRHEA, UNSPECIFIED TYPE: ICD-10-CM

## 2018-12-26 PROCEDURE — 99284 EMERGENCY DEPT VISIT MOD MDM: CPT | Mod: Z6 | Performed by: FAMILY MEDICINE

## 2018-12-26 PROCEDURE — 99283 EMERGENCY DEPT VISIT LOW MDM: CPT | Performed by: FAMILY MEDICINE

## 2018-12-26 PROCEDURE — 25000132 ZZH RX MED GY IP 250 OP 250 PS 637: Performed by: FAMILY MEDICINE

## 2018-12-26 PROCEDURE — 25000131 ZZH RX MED GY IP 250 OP 636 PS 637: Performed by: FAMILY MEDICINE

## 2018-12-26 RX ORDER — ONDANSETRON 4 MG/1
4 TABLET, ORALLY DISINTEGRATING ORAL ONCE
Status: COMPLETED | OUTPATIENT
Start: 2018-12-26 | End: 2018-12-26

## 2018-12-26 RX ORDER — ACETAMINOPHEN 325 MG/1
975 TABLET ORAL ONCE
Status: COMPLETED | OUTPATIENT
Start: 2018-12-26 | End: 2018-12-26

## 2018-12-26 RX ORDER — ONDANSETRON 4 MG/1
4 TABLET, ORALLY DISINTEGRATING ORAL EVERY 6 HOURS PRN
Qty: 10 TABLET | Refills: 0 | Status: SHIPPED | OUTPATIENT
Start: 2018-12-26 | End: 2019-01-29

## 2018-12-26 RX ADMIN — ONDANSETRON 4 MG: 4 TABLET, ORALLY DISINTEGRATING ORAL at 20:09

## 2018-12-26 RX ADMIN — ACETAMINOPHEN 975 MG: 325 TABLET ORAL at 20:52

## 2018-12-26 NOTE — ED AVS SNAPSHOT
[facility] Emergency Department  [address] [place] [address]  Phone:  [phone]  Fax:  [phone]                                    Valarie Talavera   MRN: [id number]    Department:  [facility] Emergency Department   Date of Visit:  12/26/2018           After Visit Summary Signature Page    I have received my discharge instructions, and my questions have been answered. I have discussed any challenges I see with this plan with the nurse or doctor.    ..........................................................................................................................................  Patient/Patient Representative Signature      ..........................................................................................................................................  Patient Representative Print Name and Relationship to Patient    ..................................................               ................................................  Date                                   Time    ..........................................................................................................................................  Reviewed by Signature/Title    ...................................................              ..............................................  Date                                               Time          22EPIC Rev 08/18

## 2018-12-27 ASSESSMENT — ENCOUNTER SYMPTOMS
ABDOMINAL PAIN: 0
DIARRHEA: 1
NAUSEA: 1
DYSURIA: 0

## 2018-12-27 NOTE — ED NOTES
Pt reports diarrhea for the past 3 weeks with nausea and vomiting for about 30 hours. Pt reports she is unable to keep anything down. States she had a fever last night with the highest being 103.7 axillary and has also been coughing.

## 2018-12-27 NOTE — DISCHARGE INSTRUCTIONS
Please read and follow the handout(s) instructions. Return, if needed, for increased or worsening symptoms and as directed by the handout(s).    Increase your fluid intake. Drinking small amounts often is the best way to take in more fluids when you are feeling nauseated.    Yogurt orally daily may help prevent diarrhea.

## 2018-12-27 NOTE — ED PROVIDER NOTES
"  History     Chief Complaint   Patient presents with     Nausea & Vomiting     The history is provided by the patient.     Valarie Talavera is a 28 year old female who presents to the emergency department with complaints of vomiting and 3 weeks of diarrhea. She has not been to see her PCP for this diarrhea because she didn't have time to make an appointment. She states \"my stool has the smell of C Diff\". 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 Patient started to get a sore throat 2 days ago and has been coughing a lot. Patient spiked a fever last night and started vomiting. She does have body aches, but thinks it's from coughing and vomiting. Her fever did break a couple of hours ago. Patient has not been able to eat anything today because of the vomiting. She has not noticed any blood in her vomit. She notes that she did have a few bloody noses because of the vomiting. The last time she tried to eat something was 6 hours ago and she proceeded to vomit it up. Patient has not had a lot of urine output today. She denies any urinary symptoms or abnormal vaginal discharge. Patient's stomach feels sore from vomiting, but otherwise denies any abdominal pain. Patient has struggled with GERD in the past and is on Omeprazole, prn.     Problem List:    Patient Active Problem List    Diagnosis Date Noted     Bilateral carpal tunnel syndrome 06/15/2018     Priority: Medium     Cubital tunnel syndrome of both upper extremities 06/15/2018     Priority: Medium     Term pregnancy 10/14/2017     Priority: Medium     Gastroesophageal reflux disease with esophagitis 10/04/2017     Priority: Medium     Back pain 08/23/2017     Priority: Medium     Hyperglycemia 07/13/2017     Priority: Medium     Nuchal cord, not applicable or unspecified fetus 06/23/2017     Priority: Medium     Encounter for supervision of other normal pregnancy in third trimester " 03/11/2017     Priority: Medium     Vitamin D deficiency 02/02/2016     Priority: Medium     HSIL on Pap smear of cervix 02/02/2016     Priority: Medium     2/2/16: Pap - HSIL. Plan colp  2/11/16 [place].  HSIL. Age 25  Plan: per tele enc on 2/18/16 [place] and cytology in August.  Will have pathologist differentiate between WESLY 2 and 3 if possible.  If lesion is worse, then LEEP.  If it is the same or better, then colp and cytology in 6 months.    8/30/16 HSIL pap/+ HR HPV (not 16 or 18). [place] not completed.  Per Dr. Luna, have pt. Schedule colp bef 11/30/16.  9/9/16 Pt. Advised.  9/22/16 [place]. NO SHOW.  10/6/16 [place] visit. NO SHOW.  12/8/16 Reminder letter.   1/11/17 [place] not done. Tracking updated for 6 mo colp/pap.   3/10/17 ASCUS pap/+ HR HPV (not 16 or 18). approx 9 weeks pregnant. Plan: colp around 20 weeks gestation, approx 5/26/17  10/14/17 delivered baby. (6 wk pp due 11/25/17)          Past Medical History:    Past Medical History:   Diagnosis Date     Anxiety      Depression with anxiety      Depressive disorder      HSIL on Pap smear of cervix 2/2/2016     Low blood pressure      Vitamin D deficiency        Past Surgical History:    Past Surgical History:   Procedure Laterality Date     NO HISTORY OF SURGERY         Family History:    Family History   Problem Relation Age of Onset     Hypertension No family hx of      Cerebrovascular Disease No family hx of      Hyperlipidemia No family hx of        Social History:  Marital Status:  Single [1]  Social History     Tobacco Use     Smoking status: Former Smoker     Types: Cigarettes     Smokeless tobacco: Never Used   Substance Use Topics     Alcohol use: Yes     Alcohol/week: 0.0 oz     Comment: Occasionally     Drug use: No        Medications:      Cholecalciferol (VITAMIN D) 2000 UNITS tablet   IBUPROFEN PO   lanolin ointment   meloxicam (MOBIC) 15 MG tablet   omeprazole (PRILOSEC) 20 MG CR capsule   oxyCODONE IR (ROXICODONE) 5 MG tablet   Prenatal  Multivit-Min-Fe-FA (PRENATAL VITAMINS PO)         Review of Systems   Gastrointestinal: Positive for diarrhea (She states the diarrhea is actually improved over the last week or so.) and nausea. Negative for abdominal pain.   Genitourinary: Negative for dysuria, pelvic pain, vaginal bleeding and vaginal discharge.   All other systems reviewed and are negative.      Physical Exam   BP: 126/82  Heart Rate: 82  Temp: 98.3  F (36.8  C)  Resp: 16  Weight: 116.4 kg (256 lb 9.6 oz)  SpO2: 98 %      Physical Exam   Constitutional: She is oriented to person, place, and time. She appears well-developed and well-nourished. She appears distressed (Secondary to nausea).   HENT:   Head: Normocephalic and atraumatic.   Mouth/Throat: Oropharynx is clear and moist.   Eyes: EOM are normal. Pupils are equal, round, and reactive to light.   Neck: Normal range of motion. Neck supple.   Cardiovascular: Normal rate, regular rhythm and normal heart sounds.   Pulmonary/Chest: Effort normal and breath sounds normal.   Abdominal: Soft. There is tenderness.   Musculoskeletal: Normal range of motion.   Neurological: She is alert and oriented to person, place, and time.   Skin: Skin is warm.   Psychiatric: She has a normal mood and affect. Her behavior is normal. Judgment and thought content normal.   Nursing note and vitals reviewed.      ED Course        Procedures               Critical Care time:  none               Medications   ondansetron (ZOFRAN-ODT) ODT tab 4 mg (4 mg Oral Given 12/26/18 2009)   acetaminophen (TYLENOL) tablet 975 mg (975 mg Oral Given 12/26/18 2052)       Assessments & Plan (with Medical Decision Making)  28-year-old female to the ER secondary concerns of nausea and vomiting symptoms with inability to hold anything down today.  She also has a history of her diarrhea has been present for about 3 weeks but some improved over the last week.  Patient with exam findings and history consistent with likely viral enteritis.   Patient was treated with oral Zofran and improved her symptoms to the point she could tolerate oral fluids without nausea or vomiting.  He is feeling much better and we have elected to avoid additional testing at this time and she was discharged with prescription for additional Zofran to use as needed for the nausea.  If not improved or if increased symptoms over the next 2-3 days then return to the ER as directed on the written handouts.     I have reviewed the nursing notes.    I have reviewed the findings, diagnosis, plan and need for follow up with the patient.          Medication List      Started    ondansetron 4 MG ODT tab  Commonly known as:  ZOFRAN ODT  4 mg, Oral, EVERY 6 HOURS PRN                 I verbally discussed the findings of the evaluation today in the ER. I have verbally discussed with Valarie the suggested treatment(s) as described in the discharge instructions and handouts. I have prescribed the above listed medications and instructed her on appropriate use of these medications.      I have verbally suggested she follow-up in her clinic or return to the ER for increased symptoms. See the follow-up recommendations documented  in the after visit summary in this visit's EPIC chart.      Final diagnoses:   Non-intractable vomiting with nausea, unspecified vomiting type   Diarrhea, unspecified type     This document serves as a record of services personally performed by Marlo Alanis,*. It was created on their behalf by Corazon Juarez, a trained medical scribe. The creation of this record is based on the provider's personal observations and the statements of the patient. This document has been checked and approved by the attending provider.  Note: Chart documentation done in part with Dragon Voice Recognition software. Although reviewed after completion, some word and grammatical errors may remain.    12/26/2018   [facility] EMERGENCY DEPARTMENT     Marlo Alanis, DO  12/27/18  4729

## 2019-01-29 ENCOUNTER — OFFICE VISIT (OUTPATIENT)
Dept: FAMILY MEDICINE | Facility: OTHER | Age: 29
End: 2019-01-29
Payer: MEDICAID

## 2019-01-29 ENCOUNTER — ANCILLARY PROCEDURE (OUTPATIENT)
Dept: GENERAL RADIOLOGY | Facility: OTHER | Age: 29
End: 2019-01-29
Payer: MEDICAID

## 2019-01-29 VITALS
SYSTOLIC BLOOD PRESSURE: 122 MMHG | BODY MASS INDEX: 39.39 KG/M2 | WEIGHT: 251 LBS | TEMPERATURE: 97.8 F | HEART RATE: 80 BPM | OXYGEN SATURATION: 96 % | DIASTOLIC BLOOD PRESSURE: 78 MMHG | RESPIRATION RATE: 20 BRPM | HEIGHT: 67 IN

## 2019-01-29 DIAGNOSIS — S99.911A INJURY OF RIGHT ANKLE, INITIAL ENCOUNTER: ICD-10-CM

## 2019-01-29 DIAGNOSIS — S93.491A SPRAIN OF ANTERIOR TALOFIBULAR LIGAMENT OF RIGHT ANKLE, INITIAL ENCOUNTER: Primary | ICD-10-CM

## 2019-01-29 PROCEDURE — 73610 X-RAY EXAM OF ANKLE: CPT | Mod: RT

## 2019-01-29 PROCEDURE — 99213 OFFICE O/P EST LOW 20 MIN: CPT | Performed by: PHYSICIAN ASSISTANT

## 2019-01-29 ASSESSMENT — PAIN SCALES - GENERAL: PAINLEVEL: EXTREME PAIN (9)

## 2019-01-29 ASSESSMENT — MIFFLIN-ST. JEOR: SCORE: 1898.22

## 2019-01-29 NOTE — PROGRESS NOTES
"  SUBJECTIVE:   Valarie Talavera is a 28 year old female who presents to clinic today for the following health issues:      Concern - right ankle injury  Onset: 1-    Description:   \" needle poking\", throbbing, sharp, stabbing    Intensity: mild    Progression of Symptoms:  same    Accompanying Signs & Symptoms:  Bruising, swelling    Previous history of similar problem:   YES    Precipitating factors:   Worsened by: standing, turning, stairs    Alleviating factors:  Improved by: nothing    Therapies Tried and outcome: ibuprofen, Advil, soaking in Epson salt;no relief, tiger balm;  slight relief     Patient is a 28 year old female who presents with ongoing pain in right ankle. She said that she injured the ankle while pivoting to close the front door of her home when she inverted her ankle twice. Upon the second inversion she thought she heard a snap. Patient has been able to bear weight with some pain. Denies numbness, tingling, instability or significant discoloration. Patient works as a teacher for toddlers at a local  and would like an xray to rule out fracture.     Problem list and histories reviewed & adjusted, as indicated.  Additional history: as documented    Reviewed and updated as needed this visit by clinical staff  Tobacco  Allergies  Meds  Med Hx  Surg Hx  Fam Hx  Soc Hx      Reviewed and updated as needed this visit by Provider         ROS:  Constitutional, HEENT, cardiovascular, pulmonary, gi and gu systems are negative, except as otherwise noted.    OBJECTIVE:     /78 (BP Location: Left arm, Patient Position: Chair, Cuff Size: Adult Large)   Pulse 80   Temp 97.8  F (36.6  C) (Oral)   Resp 20   Ht 1.689 m (5' 6.5\")   Wt 113.9 kg (251 lb)   SpO2 96%   BMI 39.91 kg/m    Body mass index is 39.91 kg/m .  GENERAL: healthy, alert and no distress  RESP: lungs clear to auscultation - no rales, rhonchi or wheezes  CV: regular rate and rhythm, normal S1 S2, no S3 or S4, no " murmur, click or rub, no peripheral edema and peripheral pulses strong  MS: limited active ROM to yas/invert right ankle due to pain, pain with passive ROM inversion. Tender to palpation over ATF and CFL ligaments of right ankle. Minimal swelling and no discoloration. Patient able to bear weight on ankle with pain.   NEURO: Normal strength and tone, mentation intact and speech normal  PSYCH: mentation appears normal, affect normal/bright    Diagnostic Test Results:  Xray - RIGHT ANKLE THREE OR MORE VIEWS   1/29/2019 11:18 AM      HISTORY: Injury of right ankle, initial encounter.     COMPARISON: None.     FINDINGS:  There is no fracture.  Ankle mortise and talar dome are  intact and symmetric. Minimal soft tissue swelling is seen along the  lateral malleolus.                                                                      IMPRESSION:  Probable lateral ankle sprain. No acute fracture or  malalignment.     ESTELITA ONTIVEROS MD    ASSESSMENT/PLAN:     1. Sprain of anterior talofibular ligament of right ankle, initial encounter  Discussed with patient initial impression of unremarkable imaging. Recommend use of brace and RICE therapy. Instructed patient on use of brace, realistic timeline for recovery and sent patient home with education material.      2. Injury of right ankle, initial encounter  See above.  - XR Ankle Right G/E 3 Views; Future    Follow up with clinic as needed or sooner if conditions change, worsen or fail to improve as expected.      Corey Collins PA-C  [facility]

## 2019-01-29 NOTE — NURSING NOTE
Health Maintenance Due   Topic Date Due     COLPOSCOPY Q6 MOS  08/11/2016     PAP Q6 MOS DIAGNOSTIC  09/10/2017     INFLUENZA VACCINE (1) 09/01/2018     PHQ-2 Q1 YR  10/27/2018     Roz STORY LPN

## 2019-01-29 NOTE — PATIENT INSTRUCTIONS
Patient Education     Understanding Ankle Sprain    The ankle is the joint where the leg and foot meet. Bones are held in place by connective tissue called ligaments. When ankle ligaments are stretched to the point of pain and injury, it is called an ankle sprain. A sprain can tear the ligaments. These tears can be very small but still cause pain. Ankle sprains can be mild or severe.  What causes an ankle sprain?  A sprain may occur when you twist your ankle or bend it too far. This can happen when you stumble or fall. Things that can make an ankle sprain more likely include:    Having had an ankle sprain before    Playing sports that involve running and jumping. Or playing contact sports such as football or hockey.    Wearing shoes that don t support your feet and ankles well    Having ankles with poor strength and flexibility  Symptoms of an ankle sprain  Symptoms may include:    Pain or soreness in the ankle    Swelling    Redness or bruising    Not being able to walk or put weight on the affected foot    Reduced range of motion in the ankle    A popping or tearing feeling at the time the sprain occurs    An abnormal or dislocated look to the ankle    Instability or too much range of motion in the ankle  Treatment for an ankle sprain  Treatment focuses on reducing pain and swelling, and avoiding further injury. Treatments may include:    Resting the ankle. Avoid putting weight on it. This may mean using crutches until the sprain heals.    Prescription or over-the-counter pain medicines. These help reduce swelling and pain.    Cold packs. These help reduce pain and swelling.    Raising your ankle above your heart. This helps reduce swelling.    Wrapping the ankle with an elastic bandage or ankle brace. This helps reduce swelling and gives some support to the ankle. In rare cases, you may need a cast or boot.    Stretching and other exercises. These improve flexibility and strength.    Heat packs. These may be  recommended before doing ankle exercises.  Possible complications of an ankle sprain  An ankle that has been weakened by a sprain can be more likely to have repeated sprains afterward. Doing exercises to strengthen your ankle and improve balance can reduce your risk for repeated sprains. Other possible complications are long-term (chronic) pain or an ankle that remains unstable.  When to call your healthcare provider  Call your healthcare provider right away if you have any of these:    Fever of 100.4 F (38 C) or higher, or as directed    Pain, numbness, discoloration, or coldness in the foot or toes    Pain that gets worse    Symptoms that don t get better, or get worse    New symptoms   Date Last Reviewed: 3/10/2016    3263-6721 [employer]. [address] [place] [address], [place] [address]. All rights reserved. This information is not intended as a substitute for professional medical care. Always follow your healthcare professional's instructions.           Patient Education     RICE     Rest an injury, elevate it, and use ice and compression as directed.   RICE stands for rest, ice, compression, and elevation. These can limit pain and swelling after an injury. RICE may be recommended to help treat breaks (fractures), sprains, strains, and bruises or bumps.   Home care  Here are the details of RICE:    Rest. Limit the use of the injured body part. This helps prevent further damage to the body part and gives it time to heal. In some cases, you may need a sling, brace, splint, or cast to help keep the body part still until it has healed.    Ice. Applying ice right after an injury helps relieve pain and swelling. To make an ice pack, put ice cubes in a plastic bag that seals at the top. Wrap the bag in a clean, thin towel or cloth. Then place it over the injured area. Do this for 10 to 15 minutes every 3 to 4 hours. Continue for the next 1 to 3 days or until your symptoms improve. Never put ice directly on  your skin. Don't ice an area longer than 15 minutes at a time.    Compression. Putting pressure on an injury helps reduce swelling and provides support. Wrap the injured area firmly with an elastic bandage or wrap. Make sure not to wrap the bandage too tightly or you will cut off blood flow to the injured area. If your bandage loosens, rewrap it.    Elevation. Keeping an injury raised or elevated above the level of your heart reduces swelling, pain, and throbbing. For instance, if you have a broken leg, it may help to rest your leg on several pillows when sitting or lying down. Try to keep the injured area elevated as often as possible.  Follow-up care  Follow up with your healthcare provider, or as advised.  When to seek medical advice  Call your healthcare provider right away if any of these occur:    Fever of 100.4 F (38 C) or higher, or as directed by your healthcare provider    Chills    Increased pain or swelling in the injured body part    Injured body part becomes cold, blue, numb, or tingly    Signs of infection. These include warmth in the skin, redness, drainage, or bad smell coming from the injured body part.  Date Last Reviewed: 6/1/2018 2000-2018 [employer]. [address] [place] [address], [place] [address]. All rights reserved. This information is not intended as a substitute for professional medical care. Always follow your healthcare professional's instructions.

## 2019-02-28 NOTE — TELEPHONE ENCOUNTER
All of your prenatal labs were normal for you except you don't show any immunity to Rubella.  I would recommend caution if you're around anyone with a rash while you're pregnant as your baby wouldn't be protected from exposure to rubella.  I'll also recommend rubella immunization after delivery.   Ventricular Rate : 87  Atrial Rate : 86  P-R Interval : 180  QRS Duration : 97  Q-T Interval : 348  QTC Calculation(Bezet) : 419  P Axis : 39  R Axis : -27  T Axis : -1  Diagnosis : Sinus rhythm  Borderline T abnormalities, diffuse leads  There is no evidence of RV nor posterior involvement.    Posterior leads Chest Leads As Follows: V6R, V5R, V4R, V7, V8, V9  Confirmed by Hammad Rosas ([id number]) on 2/22/2019 1:02:44 PM

## 2019-03-12 NOTE — PROGRESS NOTES
SUBJECTIVE:   Valarie Talavera is a 28 year old female who presents to clinic today for the following health issues:    Patient presents with an odd request today.  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  She is currently undergoing intermittent fasting and some modified ketogenic dieting she has been able to lose 15 pounds and is pleased with her progress.  Far.  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      We have a cordelia conversation with her about the fact that she is well overdue for a Pap and pelvic exam as well as colposcopy due to changes of the cervix from the past.  She understands that this is still something that she needs to do. We talked about the risks related to cervical cancer and that we would prefer that she pursue this very quickly.   she declines genital exam today.    HPI  Patient started a new diet and has lost 15 pounds. She has a strict schedule to eat on and is requesting a note stating she needs to be allowed to follow this at work.     Patient would also like to have labs drawn today to check levels.     Problem list and histories reviewed & adjusted, as indicated.  Additional history: as documented    Patient Active Problem List   Diagnosis     Vitamin D deficiency     HSIL on Pap smear of cervix     Encounter for supervision of other normal pregnancy in third trimester     Nuchal cord, not applicable or unspecified fetus     Hyperglycemia     Back pain     Gastroesophageal reflux disease with esophagitis     Term pregnancy     Bilateral carpal tunnel syndrome     Cubital tunnel syndrome of both upper extremities     Obesity (BMI 35.0-39.9) with comorbidity (H)     Hyperlipidemia LDL goal <160     Past Surgical History:   Procedure Laterality Date     NO  HISTORY OF SURGERY         Social History     Tobacco Use     Smoking status: Current Some Day Smoker     Types: Cigarettes     Smokeless tobacco: Never Used     Tobacco comment: very rare   Substance Use Topics     Alcohol use: Yes     Alcohol/week: 0.0 oz     Comment: Occasionally     Family History   Problem Relation Age of Onset     Hypertension No family hx of      Cerebrovascular Disease No family hx of      Hyperlipidemia No family hx of          Current Outpatient Medications   Medication Sig Dispense Refill     cholecalciferol (VITAMIN D3) 5000 units (125 mcg) capsule Take 5,000 Units by mouth daily       cyanocobalamin (VITAMIN B-12) 1000 MCG tablet Take by mouth daily       magnesium 250 MG tablet Take 1 tablet by mouth daily       NONFORMULARY        omeprazole (PRILOSEC) 20 MG CR capsule Take 1 capsule (20 mg) by mouth daily 90 capsule 1     IBUPROFEN PO Take 800 mg by mouth       Prenatal Multivit-Min-Fe-FA (PRENATAL VITAMINS PO) Take 1 tablet by mouth daily       Allergies   Allergen Reactions     Zithromax [Azithromycin] Other (See Comments)     hives     Recent Labs   Lab Test 10/25/17  1830 08/23/17  1518  08/30/16  1153 02/02/16  1345 07/24/15  1801   LDL  --   --   --   --  74  --    HDL  --   --   --   --  29*  --    TRIG  --   --   --   --  233*  --    ALT 28 21  --   --   --  22   CR 0.75 0.49*   < >  --   --  0.62   GFRESTIMATED >90 >90   < >  --   --  >90  Non  GFR Calc     GFRESTBLACK >90 >90   < >  --   --  >90  African American GFR Calc     POTASSIUM 3.9 3.8   < >  --   --  3.7   TSH  --   --   --  0.46  --   --     < > = values in this interval not displayed.      BP Readings from Last 3 Encounters:   03/15/19 104/68   01/29/19 122/78   12/26/18 123/75    Wt Readings from Last 3 Encounters:   03/15/19 107 kg (235 lb 12.8 oz)   01/29/19 113.9 kg (251 lb)   12/26/18 116.4 kg (256 lb 9.6 oz)                  Labs reviewed in EPIC    ROS:  CONSTITUTIONAL: NEGATIVE for fever,  chills, change in weight  INTEGUMENTARY/SKIN: NEGATIVE for worrisome rashes, moles or lesions  ENT/MOUTH: NEGATIVE for ear, mouth and throat problems  RESP: NEGATIVE for significant cough or SOB  CV: NEGATIVE for chest pain, palpitations or peripheral edema  GI: NEGATIVE for nausea, abdominal pain, heartburn, or change in bowel habits  MUSCULOSKELETAL: NEGATIVE for significant arthralgias or myalgia  NEURO: NEGATIVE for weakness, dizziness or paresthesias  ENDOCRINE: NEGATIVE for temperature intolerance, skin/hair changes  PSYCHIATRIC: NEGATIVE for changes in mood or affect    OBJECTIVE:     /68 (Cuff Size: Adult Large)   Pulse 76   Temp 96.8  F (36  C) (Temporal)   Resp 16   Wt 107 kg (235 lb 12.8 oz)   BMI 37.49 kg/m     Body mass index is 37.49 kg/m .  GENERAL: healthy, alert and no distress  EYES: Eyes grossly normal to inspection, PERRL and conjunctivae and sclerae normal  HENT: ear canals and TM's normal, nose and mouth without ulcers or lesions  NECK: no adenopathy, no asymmetry, masses, or scars and thyroid normal to palpation  RESP: lungs clear to auscultation - no rales, rhonchi or wheezes  CV: regular rate and rhythm, normal S1 S2, no S3 or S4, no murmur, click or rub, no peripheral edema and peripheral pulses strong  ABDOMEN: soft, nontender, no hepatosplenomegaly, no masses and bowel sounds normal  MS: no gross musculoskeletal defects noted, no edema  SKIN: no suspicious lesions or rashes to visible skin with exception of some scattered moles.  NEURO: Normal strength and tone, mentation intact and speech normal  PSYCH: mentation appears normal, affect normal/bright    Diagnostic Test Results:  No results found for this or any previous visit (from the past 24 hour(s)).    ASSESSMENT/PLAN:     1. Routine general medical examination at a health care facility  Advised labs in the near future to be able to check for metabolic processes related to her dieting.  - CBC with platelets; Future  -  Comprehensive metabolic panel; Future  - Lipid panel reflex to direct LDL Fasting; Future  - TSH with free T4 reflex; Future    2. Morbid obesity (H)  Encouraged her to continue with her current diet approach.  Lean meats are discussed.  Cutting back on fatty foods is also discussed.    3. Gastroesophageal reflux disease with esophagitis  Good control at this point time no new concerns.    4. HSIL on Pap smear of cervix  Strongly advised Pap smear in the near future to reevaluate this.  She needs to arrange for this on her own.    5. Hyperlipidemia LDL goal <160  Goal set for cholesterol related to her age and risk factors.  - Comprehensive metabolic panel; Future  - Lipid panel reflex to direct LDL Fasting; Future  - TSH with free T4 reflex; Future    Follow-up in 3 months is advised depending on what her labs look like.    Noam Evangelista PA-C  [facility]

## 2019-03-15 ENCOUNTER — OFFICE VISIT (OUTPATIENT)
Dept: FAMILY MEDICINE | Facility: OTHER | Age: 29
End: 2019-03-15
Payer: MEDICAID

## 2019-03-15 VITALS
RESPIRATION RATE: 16 BRPM | SYSTOLIC BLOOD PRESSURE: 104 MMHG | WEIGHT: 235.8 LBS | BODY MASS INDEX: 37.49 KG/M2 | HEART RATE: 76 BPM | TEMPERATURE: 96.8 F | DIASTOLIC BLOOD PRESSURE: 68 MMHG

## 2019-03-15 DIAGNOSIS — E78.5 HYPERLIPIDEMIA LDL GOAL <160: ICD-10-CM

## 2019-03-15 DIAGNOSIS — Z00.00 ROUTINE GENERAL MEDICAL EXAMINATION AT A HEALTH CARE FACILITY: Primary | ICD-10-CM

## 2019-03-15 DIAGNOSIS — E66.01 MORBID OBESITY (H): ICD-10-CM

## 2019-03-15 DIAGNOSIS — K21.00 GASTROESOPHAGEAL REFLUX DISEASE WITH ESOPHAGITIS: ICD-10-CM

## 2019-03-15 DIAGNOSIS — R87.613 HSIL ON PAP SMEAR OF CERVIX: ICD-10-CM

## 2019-03-15 PROCEDURE — 99395 PREV VISIT EST AGE 18-39: CPT | Performed by: PHYSICIAN ASSISTANT

## 2019-03-15 RX ORDER — MULTIVITAMIN WITH IRON
1 TABLET ORAL DAILY
COMMUNITY
End: 2020-09-17

## 2019-03-15 RX ORDER — LANOLIN ALCOHOL/MO/W.PET/CERES
CREAM (GRAM) TOPICAL DAILY
COMMUNITY
End: 2020-09-17

## 2019-03-15 ASSESSMENT — PAIN SCALES - GENERAL: PAINLEVEL: NO PAIN (0)

## 2019-03-15 NOTE — LETTER
[facility]  [address] [address] [phone]        March 15, 2019  In regards to:  Valarie Talavera  [id number] [address]          To whom it may concern,    Valarie Talavera is currently utilizing a method of dieting that dictates her consumption as to foods available and timing of consumption.  Please allow her to follow her dietary restrictions.    Sincerely,        KELLY Hobbs PA-C

## 2019-03-25 ENCOUNTER — OFFICE VISIT (OUTPATIENT)
Dept: OBGYN | Facility: OTHER | Age: 29
End: 2019-03-25
Payer: MEDICAID

## 2019-03-25 VITALS
WEIGHT: 232.6 LBS | SYSTOLIC BLOOD PRESSURE: 110 MMHG | HEART RATE: 80 BPM | BODY MASS INDEX: 36.98 KG/M2 | DIASTOLIC BLOOD PRESSURE: 68 MMHG

## 2019-03-25 DIAGNOSIS — R87.613 HSIL ON PAP SMEAR OF CERVIX: Primary | ICD-10-CM

## 2019-03-25 LAB — HCG UR QL: NEGATIVE

## 2019-03-25 PROCEDURE — 88305 TISSUE EXAM BY PATHOLOGIST: CPT | Mod: 26 | Performed by: OBSTETRICS & GYNECOLOGY

## 2019-03-25 PROCEDURE — 88305 TISSUE EXAM BY PATHOLOGIST: CPT | Performed by: OBSTETRICS & GYNECOLOGY

## 2019-03-25 PROCEDURE — 81025 URINE PREGNANCY TEST: CPT | Performed by: OBSTETRICS & GYNECOLOGY

## 2019-03-25 PROCEDURE — 57454 BX/CURETT OF CERVIX W/SCOPE: CPT | Performed by: OBSTETRICS & GYNECOLOGY

## 2019-03-25 SDOH — HEALTH STABILITY: MENTAL HEALTH: HOW OFTEN DO YOU HAVE A DRINK CONTAINING ALCOHOL?: NEVER

## 2019-03-25 NOTE — PROGRESS NOTES
Colposcopy Visit/Procedure Note:    Valarie Talavera is an 28 year old, , who comes in for diagnosis of abnormal pap screen. She has had HSIL PAPs x2 in 2016 as well as HSIL on ECC at that time. Did not present for recommended colposcopy following this, and did not have a colposcopy during her recent pregnancy. Sexually active, not using contraception, does not desire repeat pregnancy. She is a smoker.     Menstrual History:  Menstrual History 2018 2018 3/25/2019   LAST MENSTRUAL PERIOD 2018 2018 3/5/2019     Lab Results   Component Value Date    PAP ASC-US 03/10/2017    PAP HSIL 2016    PAP HSIL 2016     History   Smoking Status     Current Some Day Smoker     Types: Cigarettes   Smokeless Tobacco     Never Used     Comment: very rare     Allergies as of 2019 - Reviewed 2019   Allergen Reaction Noted     Zithromax [azithromycin] Other (See Comments) 2015      Colposcopy Procedure:  Consent:  Details of the colposcopic procedure were reviewed. Risks, benefits of treatment, and alternate forms of evaluation were discussed.  Patient's questions were elicited and answered.   Written consent was obtained and scanned into medical record.     Verification of Procedure  Just before the procedure begins, through verbal and active participation of team members, I verified:   Initials   Patient Name JCB   Patient  JCB   Procedure to be performed JCB       OBJECTIVE: /68   Pulse 80   Wt 105.5 kg (232 lb 9.6 oz)   LMP 2019   BMI 36.98 kg/m      Pelvic Exam:  EG/BUS: Normal genital architecture without lesions, erythema or abnormal secretions. Bartholin's, Urethra, Lockland's glands are normal.   Vagina: moist, pink, rugae with creamy, white and odorlesssecretions  Cervix: papillary metaplasia surrounding the external os with increased prominence at 1200  Rectum:anus normal    PROCEDURE:    Acetic acid and/or Lugol's solution applied to cervix.  Colposcopic  exam of the cervix and apex of the vagina was conducted in the usual fashion.     Findings:  SCJ was not seen entirely and the exam unsatisfactory.    There were acetowhite area(s) and papillary metaplasia circumferentially and punctation noted at 6:00-7:00    Biopsies were obtained at 6 & 12 o'clock and placed in labeled Formalin Jar.    ECC: was obtained and placed in labeled Formalin Jar.    Assessment: CA in situ    Plan:   Biopsies sent to pathology.  Will contact patient with results and recommended follow-up plan.     Patient advised to contact clinic with heavy vaginal bleeding, fever over 101 degrees F, or any other concerns.    Advised that evaluation of sexual contacts is NOT warranted as she can not get the same virus again. Risk of exposure to a NEW virus is possible with partner change.    Verbalized understanding and agreement with plan.    Enzo Carmen MD  OB/GYN  March 25, 2019, 4:05 PM

## 2019-03-27 ENCOUNTER — APPOINTMENT (OUTPATIENT)
Dept: ULTRASOUND IMAGING | Facility: CLINIC | Age: 29
End: 2019-03-27
Attending: PHYSICIAN ASSISTANT
Payer: MEDICAID

## 2019-03-27 ENCOUNTER — HOSPITAL ENCOUNTER (EMERGENCY)
Facility: CLINIC | Age: 29
Discharge: HOME | End: 2019-03-28
Attending: PHYSICIAN ASSISTANT | Admitting: PHYSICIAN ASSISTANT
Payer: MEDICAID

## 2019-03-27 ENCOUNTER — NURSE TRIAGE (OUTPATIENT)
Dept: NURSING | Facility: CLINIC | Age: 29
End: 2019-03-27

## 2019-03-27 DIAGNOSIS — R19.7 VOMITING AND DIARRHEA: ICD-10-CM

## 2019-03-27 DIAGNOSIS — B96.89 BACTERIAL VAGINOSIS: ICD-10-CM

## 2019-03-27 DIAGNOSIS — R11.10 VOMITING AND DIARRHEA: ICD-10-CM

## 2019-03-27 DIAGNOSIS — N76.0 BACTERIAL VAGINOSIS: ICD-10-CM

## 2019-03-27 DIAGNOSIS — N83.201 RIGHT OVARIAN CYST: ICD-10-CM

## 2019-03-27 LAB
ALBUMIN SERPL-MCNC: 3.9 G/DL (ref 3.4–5)
ALP SERPL-CCNC: 72 U/L (ref 40–150)
ALT SERPL W P-5'-P-CCNC: 29 U/L (ref 0–50)
ANION GAP SERPL CALCULATED.3IONS-SCNC: 8 MMOL/L (ref 3–14)
AST SERPL W P-5'-P-CCNC: 11 U/L (ref 0–45)
BASOPHILS # BLD AUTO: 0 10E9/L (ref 0–0.2)
BASOPHILS NFR BLD AUTO: 0.2 %
BILIRUB SERPL-MCNC: 0.3 MG/DL (ref 0.2–1.3)
BUN SERPL-MCNC: 20 MG/DL (ref 7–30)
CALCIUM SERPL-MCNC: 9.1 MG/DL (ref 8.5–10.1)
CHLORIDE SERPL-SCNC: 108 MMOL/L (ref 94–109)
CO2 SERPL-SCNC: 26 MMOL/L (ref 20–32)
CREAT SERPL-MCNC: 0.68 MG/DL (ref 0.52–1.04)
DIFFERENTIAL METHOD BLD: NORMAL
EOSINOPHIL NFR BLD AUTO: 1.6 %
ERYTHROCYTE [DISTWIDTH] IN BLOOD BY AUTOMATED COUNT: 12.9 % (ref 10–15)
GFR SERPL CREATININE-BSD FRML MDRD: >90 ML/MIN/{1.73_M2}
GLUCOSE SERPL-MCNC: 77 MG/DL (ref 70–99)
HCT VFR BLD AUTO: 40.3 % (ref 35–47)
HGB BLD-MCNC: 13.4 G/DL (ref 11.7–15.7)
IMM GRANULOCYTES # BLD: 0 10E9/L (ref 0–0.4)
IMM GRANULOCYTES NFR BLD: 0.2 %
LIPASE SERPL-CCNC: 237 U/L (ref 73–393)
LYMPHOCYTES # BLD AUTO: 3.1 10E9/L (ref 0.8–5.3)
LYMPHOCYTES NFR BLD AUTO: 34.2 %
MCH RBC QN AUTO: 29.3 PG (ref 26.5–33)
MCHC RBC AUTO-ENTMCNC: 33.3 G/DL (ref 31.5–36.5)
MCV RBC AUTO: 88 FL (ref 78–100)
MONOCYTES # BLD AUTO: 0.9 10E9/L (ref 0–1.3)
MONOCYTES NFR BLD AUTO: 10.5 %
NEUTROPHILS # BLD AUTO: 4.7 10E9/L (ref 1.6–8.3)
NEUTROPHILS NFR BLD AUTO: 53.3 %
NRBC # BLD AUTO: 0 10*3/UL
NRBC BLD AUTO-RTO: 0 /100
PLATELET # BLD AUTO: 343 10E9/L (ref 150–450)
POTASSIUM SERPL-SCNC: 3.6 MMOL/L (ref 3.4–5.3)
PROT SERPL-MCNC: 7.5 G/DL (ref 6.8–8.8)
RBC # BLD AUTO: 4.57 10E12/L (ref 3.8–5.2)
SODIUM SERPL-SCNC: 142 MMOL/L (ref 133–144)
SPECIMEN SOURCE: ABNORMAL
WBC # BLD AUTO: 8.9 10E9/L (ref 4–11)
WET PREP SPEC: ABNORMAL

## 2019-03-27 PROCEDURE — 96375 TX/PRO/DX INJ NEW DRUG ADDON: CPT | Performed by: PHYSICIAN ASSISTANT

## 2019-03-27 PROCEDURE — 96374 THER/PROPH/DIAG INJ IV PUSH: CPT | Performed by: PHYSICIAN ASSISTANT

## 2019-03-27 PROCEDURE — 99284 EMERGENCY DEPT VISIT MOD MDM: CPT | Mod: 25 | Performed by: PHYSICIAN ASSISTANT

## 2019-03-27 PROCEDURE — 25000128 H RX IP 250 OP 636: Performed by: PHYSICIAN ASSISTANT

## 2019-03-27 PROCEDURE — 83690 ASSAY OF LIPASE: CPT | Performed by: PHYSICIAN ASSISTANT

## 2019-03-27 PROCEDURE — 99284 EMERGENCY DEPT VISIT MOD MDM: CPT | Mod: Z6 | Performed by: PHYSICIAN ASSISTANT

## 2019-03-27 PROCEDURE — 80053 COMPREHEN METABOLIC PANEL: CPT | Performed by: PHYSICIAN ASSISTANT

## 2019-03-27 PROCEDURE — 25000132 ZZH RX MED GY IP 250 OP 250 PS 637: Performed by: PHYSICIAN ASSISTANT

## 2019-03-27 PROCEDURE — 96361 HYDRATE IV INFUSION ADD-ON: CPT | Performed by: PHYSICIAN ASSISTANT

## 2019-03-27 PROCEDURE — 93976 VASCULAR STUDY: CPT | Mod: XS

## 2019-03-27 PROCEDURE — 87210 SMEAR WET MOUNT SALINE/INK: CPT | Performed by: PHYSICIAN ASSISTANT

## 2019-03-27 PROCEDURE — 85025 COMPLETE CBC W/AUTO DIFF WBC: CPT | Performed by: PHYSICIAN ASSISTANT

## 2019-03-27 RX ORDER — METRONIDAZOLE 7.5 MG/G
1 GEL VAGINAL AT BEDTIME
Qty: 35 G | Refills: 0 | Status: SHIPPED | OUTPATIENT
Start: 2019-03-27 | End: 2019-06-12

## 2019-03-27 RX ORDER — SODIUM CHLORIDE 9 MG/ML
1000 INJECTION, SOLUTION INTRAVENOUS CONTINUOUS
Status: DISCONTINUED | OUTPATIENT
Start: 2019-03-27 | End: 2019-03-28 | Stop reason: HOSPADM

## 2019-03-27 RX ORDER — ACETAMINOPHEN 325 MG/1
975 TABLET ORAL ONCE
Status: COMPLETED | OUTPATIENT
Start: 2019-03-27 | End: 2019-03-27

## 2019-03-27 RX ORDER — KETOROLAC TROMETHAMINE 30 MG/ML
30 INJECTION, SOLUTION INTRAMUSCULAR; INTRAVENOUS ONCE
Status: COMPLETED | OUTPATIENT
Start: 2019-03-27 | End: 2019-03-27

## 2019-03-27 RX ORDER — ONDANSETRON 2 MG/ML
4 INJECTION INTRAMUSCULAR; INTRAVENOUS EVERY 30 MIN PRN
Status: DISCONTINUED | OUTPATIENT
Start: 2019-03-27 | End: 2019-03-28 | Stop reason: HOSPADM

## 2019-03-27 RX ADMIN — KETOROLAC TROMETHAMINE 30 MG: 30 INJECTION, SOLUTION INTRAMUSCULAR at 22:36

## 2019-03-27 RX ADMIN — ACETAMINOPHEN 975 MG: 325 TABLET ORAL at 22:05

## 2019-03-27 RX ADMIN — SODIUM CHLORIDE 1000 ML: 9 INJECTION, SOLUTION INTRAVENOUS at 22:05

## 2019-03-27 RX ADMIN — ONDANSETRON 4 MG: 2 INJECTION INTRAMUSCULAR; INTRAVENOUS at 22:05

## 2019-03-27 NOTE — LETTER
March 28, 2019      To Whom It May Concern:      Valarie Talavera was seen in our Emergency Department today, 03/27/19.  I expect her condition to improve over the next couple days.  She may return to work when improved.  Please excuse her from work on 3/28/2019 due to her medical condition.    Sincerely,            Pérez Martinez PA-C

## 2019-03-27 NOTE — ED AVS SNAPSHOT
[facility] Emergency Department  [address] [place] [address]  Phone:  [phone]  Fax:  [phone]                                    Valarie Talavera   MRN: [id number]    Department:  [facility] Emergency Department   Date of Visit:  3/27/2019           After Visit Summary Signature Page    I have received my discharge instructions, and my questions have been answered. I have discussed any challenges I see with this plan with the nurse or doctor.    ..........................................................................................................................................  Patient/Patient Representative Signature      ..........................................................................................................................................  Patient Representative Print Name and Relationship to Patient    ..................................................               ................................................  Date                                   Time    ..........................................................................................................................................  Reviewed by Signature/Title    ...................................................              ..............................................  Date                                               Time          22EPIC Rev 08/18

## 2019-03-28 VITALS
TEMPERATURE: 98.4 F | RESPIRATION RATE: 18 BRPM | SYSTOLIC BLOOD PRESSURE: 138 MMHG | DIASTOLIC BLOOD PRESSURE: 86 MMHG | BODY MASS INDEX: 37.04 KG/M2 | OXYGEN SATURATION: 99 % | HEART RATE: 98 BPM | WEIGHT: 233 LBS

## 2019-03-28 LAB — COPATH REPORT: NORMAL

## 2019-03-28 NOTE — ED PROVIDER NOTES
History     Chief Complaint   Patient presents with     Vaginal Discharge     HPI  Valarie Talavera is a 28 year old female who presents for evaluation of vaginal soreness, yellow thick foul-smelling vaginal discharge, diarrhea, and vomiting starting yesterday.  Underwent colposcopy 2 days ago.  She felt that it went okay.  Started developing vaginal soreness yesterday with the following discharge as noted above.  Today she had 10 episodes of diarrhea without blood or pus in the stool.  Vomiting x4.  Able to keep fluids down per her report.  She has had a burning/stabbing type pain in the vaginal opening that did not improve with tub soaks.  Denies any fever, chills, or skin rash.  Has noted lower abdominal cramping upwards of 9 on a scale of 10 that has been keeping her up at night.  Patient denies any chance of pregnancy.  Underwent hCG 2 days ago which was negative prior to her colposcopy, and has not had intercourse since then.    Allergies:  Allergies   Allergen Reactions     Zithromax [Azithromycin] Other (See Comments)     hives       Problem List:    Patient Active Problem List    Diagnosis Date Noted     Obesity (BMI 35.0-39.9) with comorbidity (H) 03/15/2019     Priority: Medium     Hyperlipidemia LDL goal <160 03/15/2019     Priority: Medium     Bilateral carpal tunnel syndrome 06/15/2018     Priority: Medium     Cubital tunnel syndrome of both upper extremities 06/15/2018     Priority: Medium     Term pregnancy 10/14/2017     Priority: Medium     Gastroesophageal reflux disease with esophagitis 10/04/2017     Priority: Medium     Back pain 08/23/2017     Priority: Medium     Hyperglycemia 07/13/2017     Priority: Medium     Nuchal cord, not applicable or unspecified fetus 06/23/2017     Priority: Medium     Encounter for supervision of other normal pregnancy in third trimester 03/11/2017     Priority: Medium     Vitamin D deficiency 02/02/2016     Priority: Medium     HSIL on Pap smear of cervix  02/02/2016     Priority: Medium     2/2/16: Pap - HSIL. Plan colp  2/11/16 [place].  HSIL. Age 25  Plan: per tele enc on 2/18/16 [place] and cytology in August.  Will have pathologist differentiate between WESLY 2 and 3 if possible.  If lesion is worse, then LEEP.  If it is the same or better, then colp and cytology in 6 months.    8/30/16 HSIL pap/+ HR HPV (not 16 or 18). [place] not completed.  Per Dr. Luna, have pt. Schedule colp bef 11/30/16.  9/9/16 Pt. Advised.  9/22/16 [place]. NO SHOW.  10/6/16 [place] visit. NO SHOW.  12/8/16 Reminder letter.   1/11/17 [place] not done. Tracking updated for 6 mo colp/pap.   3/10/17 ASCUS pap/+ HR HPV (not 16 or 18). approx 9 weeks pregnant. Plan: colp around 20 weeks gestation, approx 5/26/17  10/14/17 delivered baby. (6 wk pp due 11/25/17)          Past Medical History:    Past Medical History:   Diagnosis Date     Anxiety      Depression with anxiety      Depressive disorder      HSIL on Pap smear of cervix 2/2/2016     Low blood pressure      Vitamin D deficiency        Past Surgical History:    Past Surgical History:   Procedure Laterality Date     NO HISTORY OF SURGERY         Family History:    Family History   Problem Relation Age of Onset     Hypertension No family hx of      Cerebrovascular Disease No family hx of      Hyperlipidemia No family hx of        Social History:  Marital Status:  Single [1]  Social History     Tobacco Use     Smoking status: Current Some Day Smoker     Types: Cigarettes     Smokeless tobacco: Never Used     Tobacco comment: very rare   Substance Use Topics     Alcohol use: No     Alcohol/week: 0.0 oz     Frequency: Never     Drug use: No        Medications:      metroNIDAZOLE (METROGEL-VAGINAL) 0.75 % vaginal gel   cholecalciferol (VITAMIN D3) 5000 units (125 mcg) capsule   cyanocobalamin (VITAMIN B-12) 1000 MCG tablet   IBUPROFEN PO   magnesium 250 MG tablet   NONFORMULARY   omeprazole (PRILOSEC) 20 MG CR capsule   Prenatal Multivit-Min-Fe-FA (PRENATAL  VITAMINS PO)         Review of Systems   All other systems reviewed and are negative.      Physical Exam   BP: 142/89  Pulse: 98  Heart Rate: 84  Temp: 98.4  F (36.9  C)  Resp: 20  Weight: 105.7 kg (233 lb)  SpO2: 99 %      Physical Exam   Constitutional: She is oriented to person, place, and time. No distress.   HENT:   Head: Normocephalic and atraumatic.   Right Ear: External ear normal.   Left Ear: External ear normal.   Nose: Nose normal.   Mouth/Throat: Oropharynx is clear and moist. No oropharyngeal exudate.   Eyes: Conjunctivae and EOM are normal. Pupils are equal, round, and reactive to light. Right eye exhibits no discharge. Left eye exhibits no discharge. No scleral icterus.   Neck: Normal range of motion. Neck supple. No thyromegaly present.   Cardiovascular: Normal rate, regular rhythm and normal heart sounds.   No murmur heard.  Pulmonary/Chest: Effort normal and breath sounds normal. No respiratory distress. She has no wheezes. She has no rales. She exhibits no tenderness.   Abdominal: Soft. Bowel sounds are normal. She exhibits no distension and no mass. There is tenderness (Bilateral lower pelvic area, right greater than left.). There is no rebound and no guarding. No hernia.   Genitourinary: Uterus normal. Pelvic exam was performed with patient supine. There is no rash, tenderness, lesion or injury on the right labia. There is no rash, tenderness, lesion or injury on the left labia. Uterus is not enlarged and not tender. Cervix exhibits friability (scarring from recent biopsy). Cervix exhibits no motion tenderness. Right adnexum displays tenderness. Right adnexum displays no mass and no fullness. Left adnexum displays no mass, no tenderness and no fullness. There is tenderness in the vagina. No erythema or bleeding in the vagina. No foreign body in the vagina. No signs of injury around the vagina. Vaginal discharge (yellow discharge) found.   Musculoskeletal: Normal range of motion. She exhibits no  edema, tenderness or deformity.   Lymphadenopathy:     She has no cervical adenopathy.   Neurological: She is alert and oriented to person, place, and time. No cranial nerve deficit.   Skin: Skin is warm and dry. Capillary refill takes less than 2 seconds. No rash noted. She is not diaphoretic. No erythema.   Psychiatric: She has a normal mood and affect. Her behavior is normal. Thought content normal.   Nursing note and vitals reviewed.      ED Course        Procedures               Critical Care time:  none               Results for orders placed or performed during the hospital encounter of 03/27/19 (from the past 24 hour(s))   CBC with platelets differential   Result Value Ref Range    WBC 8.9 4.0 - 11.0 10e9/L    RBC Count 4.57 3.8 - 5.2 10e12/L    Hemoglobin 13.4 11.7 - 15.7 g/dL    Hematocrit 40.3 35.0 - 47.0 %    MCV 88 78 - 100 fl    MCH 29.3 26.5 - 33.0 pg    MCHC 33.3 31.5 - 36.5 g/dL    RDW 12.9 10.0 - 15.0 %    Platelet Count 343 150 - 450 10e9/L    Diff Method Automated Method     % Neutrophils 53.3 %    % Lymphocytes 34.2 %    % Monocytes 10.5 %    % Eosinophils 1.6 %    % Basophils 0.2 %    % Immature Granulocytes 0.2 %    Nucleated RBCs 0 0 /100    Absolute Neutrophil 4.7 1.6 - 8.3 10e9/L    Absolute Lymphocytes 3.1 0.8 - 5.3 10e9/L    Absolute Monocytes 0.9 0.0 - 1.3 10e9/L    Absolute Basophils 0.0 0.0 - 0.2 10e9/L    Abs Immature Granulocytes 0.0 0 - 0.4 10e9/L    Absolute Nucleated RBC 0.0    Comprehensive metabolic panel   Result Value Ref Range    Sodium 142 133 - 144 mmol/L    Potassium 3.6 3.4 - 5.3 mmol/L    Chloride 108 94 - 109 mmol/L    Carbon Dioxide 26 20 - 32 mmol/L    Anion Gap 8 3 - 14 mmol/L    Glucose 77 70 - 99 mg/dL    Urea Nitrogen 20 7 - 30 mg/dL    Creatinine 0.68 0.52 - 1.04 mg/dL    GFR Estimate >90 >60 mL/min/[1.73_m2]    GFR Estimate If Black >90 >60 mL/min/[1.73_m2]    Calcium 9.1 8.5 - 10.1 mg/dL    Bilirubin Total 0.3 0.2 - 1.3 mg/dL    Albumin 3.9 3.4 - 5.0 g/dL     Protein Total 7.5 6.8 - 8.8 g/dL    Alkaline Phosphatase 72 40 - 150 U/L    ALT 29 0 - 50 U/L    AST 11 0 - 45 U/L   Lipase   Result Value Ref Range    Lipase 237 73 - 393 U/L   Wet prep   Result Value Ref Range    Specimen Description Vagina     Wet Prep No Trichomonas seen     Wet Prep Few  Clue cells seen   (A)     Wet Prep No yeast seen     Wet Prep Few  PMNs seen      US Pelvis Cmplt w Transvag & Doppler LmtPel Duplex Limited    Narrative    PELVIC ULTRASOUND WITH ENDOVAGINAL TRANSDUCER  3/27/2019 11:21 PM     HISTORY: Pelvic pain.    TECHNIQUE: Endovaginal sonography was added to the transabdominal  exam.    COMPARISON: 10/25/2017.    FINDINGS: The uterus measures 10.4 x 4.4 x 5.9 cm. No focal lesions  are seen in the myometrium. Endometrium is 15 mm thick and appears  normal.    The left ovary is not visualized. The right ovary is normal in size.  There is a 1.5 cm lesion in the right ovary with increased  echogenicity and no posterior acoustic enhancement. There are no  adnexal masses. There is trace free fluid in the cul-de-sac.      Impression    IMPRESSION: 1.5 cm echogenic lesion in the right ovary, likely a small  hemorrhagic cyst or dermoid. Trace nonspecific free fluid.    VALENTINE NAGY MD       Medications   0.9% sodium chloride BOLUS (0 mLs Intravenous Stopped 3/27/19 2237)     Followed by   sodium chloride 0.9% infusion (not administered)   ondansetron (ZOFRAN) injection 4 mg (4 mg Intravenous Given 3/27/19 2205)   acetaminophen (TYLENOL) tablet 975 mg (975 mg Oral Given 3/27/19 2205)   ketorolac (TORADOL) injection 30 mg (30 mg Intravenous Given 3/27/19 2236)       Assessments & Plan (with Medical Decision Making)     Bacterial vaginosis  Right ovarian cyst  Vomiting and diarrhea     28 year old female presents for evaluation of increased vaginal soreness, thick yellow malodorous vaginal discharge, diarrhea, vomiting, and lower pelvic cramping starting yesterday.  Underwent colposcopy procedure  "2 days prior.  No fevers or chills.  No recent travel or abnormal exposures.  No other ill family members.  On exam blood pressure 138/86, pulse 84, temperature 98.4, and oxygen saturation 99% on room air.  Patient has bilateral pelvic discomfort without rebound or guarding.  Bimanual exam with right adnexal discomfort but no masses.  Yellow vaginal discharge and scarring of the cervix.  Vaginal opening tenderness, but no sign of injury, trauma or skin lesion.  Laboratory levels show clue cells on wet prep.  GC chlamydia probe not done, as she states that she is in a monogamous relationship over the past 8 years and she does not feel that this is a risk for her.  CBC within normal white blood cell count of 8900.  Hemoglobin stable at 13.4.  Platelets normal at 343,000.  Comprehensive metabolic panel completely normal.  Lipase normal.  Pelvic ultrasound with a small 1.5 cm right-sided ovarian cyst without evidence for torsion or other mass.  The patient was given Zofran, Tylenol, and Toradol for symptoms with good success.  We also gave her a 1 L IV fluid bolus.  She did feel improved at the end of her visit.  Patient is a small ovarian cyst, which could likely contribute to her overall pelvic discomfort.  She also is likely suffering from some of the inflammation caused by her procedure.  Has a secondary infection with bacterial vaginosis.  Normal white count, afebrile, and no cervical drainage to suggest actual PID.  Therefore, oral antibiotic intervention was not performed.  MetroGel provided.  Symptom management with alternating Tylenol and ibuprofen discussed with her.  I offered pain medication, but she declined.  I am concerned that the vomiting and diarrhea may be a con commitment gastroenteritis episode.  We discussed pushing clear fluids to stay hydrated.  Once she has not had any vomiting or diarrhea for 8 hours, then she could progress to the BRAT diet.  I did offer her Zofran, but she declined.  \"I do " "not like to take medication unless I have to \".  Indications for return discussed with the patient.  She will be following up with her OB/GYN shortly for her colposcopy results.  Patient was in agreement with this plan and was suitable for discharge.     I have reviewed the nursing notes.    I have reviewed the findings, diagnosis, plan and need for follow up with the patient.         Medication List      Started    metroNIDAZOLE 0.75 % vaginal gel  Commonly known as:  METROGEL-VAGINAL  1 applicator, Vaginal, AT BEDTIME            Final diagnoses:   Bacterial vaginosis   Right ovarian cyst   Vomiting and diarrhea         Disclaimer: This note consists of symbols derived from keyboarding, dictation and/or voice recognition software. As a result, there may be errors in the script that have gone undetected. Please consider this when interpreting information found in this chart.      3/27/2019   Pérez Martinez PA-C   [facility] EMERGENCY DEPARTMENT     Pérez Martinez PA-C  03/28/19 0033    "

## 2019-03-28 NOTE — TELEPHONE ENCOUNTER
Patient states she has had an large amount of vaginal discharge and increased abdominal pain since Tuesday 3/26/19 after her colposcopy on Monday.    Patient reports a strong foul odor to the discharge and that when she placed a tampon to see if she could help get some of the discharge out that it looked and smelled like rotting tissue. States that it hurts to sit, and hurts more when she urinates. States the abdominal pains when they occur are terrible, rates the pain at a 10 out of 10 and that it lasts an hour. Vomiting and diarrhea started today. Denies fever.    Protocol and care advice reviewed.  ER recommended per protocol, patient will have someone drive her to [place] [facility] to be seen.   Caller states understanding of the recommended disposition.   Advised to call back if further questions or concerns.    Shi Rincon RN  [facility] Nurse Advisor    Reason for Disposition    [1] SEVERE abdominal pain (e.g., excruciating) AND [2] present > 1 hour    Protocols used: VAGINAL DISCHARGE-ADULT-

## 2019-04-03 ENCOUNTER — HOSPITAL ENCOUNTER (EMERGENCY)
Facility: CLINIC | Age: 29
Discharge: HOME | End: 2019-04-03
Attending: STUDENT IN AN ORGANIZED HEALTH CARE EDUCATION/TRAINING PROGRAM | Admitting: STUDENT IN AN ORGANIZED HEALTH CARE EDUCATION/TRAINING PROGRAM
Payer: MEDICAID

## 2019-04-03 ENCOUNTER — OFFICE VISIT (OUTPATIENT)
Dept: URGENT CARE | Facility: RETAIL CLINIC | Age: 29
End: 2019-04-03
Payer: MEDICAID

## 2019-04-03 ENCOUNTER — APPOINTMENT (OUTPATIENT)
Dept: GENERAL RADIOLOGY | Facility: CLINIC | Age: 29
End: 2019-04-03
Attending: STUDENT IN AN ORGANIZED HEALTH CARE EDUCATION/TRAINING PROGRAM
Payer: MEDICAID

## 2019-04-03 VITALS
HEART RATE: 98 BPM | TEMPERATURE: 100.2 F | WEIGHT: 240.31 LBS | RESPIRATION RATE: 16 BRPM | DIASTOLIC BLOOD PRESSURE: 70 MMHG | OXYGEN SATURATION: 98 % | SYSTOLIC BLOOD PRESSURE: 123 MMHG | BODY MASS INDEX: 38.21 KG/M2

## 2019-04-03 VITALS
HEART RATE: 98 BPM | SYSTOLIC BLOOD PRESSURE: 102 MMHG | TEMPERATURE: 98.5 F | DIASTOLIC BLOOD PRESSURE: 66 MMHG | OXYGEN SATURATION: 96 %

## 2019-04-03 DIAGNOSIS — J02.9 ACUTE PHARYNGITIS, UNSPECIFIED ETIOLOGY: Primary | ICD-10-CM

## 2019-04-03 DIAGNOSIS — J10.1 INFLUENZA B: ICD-10-CM

## 2019-04-03 LAB
FLUAV+FLUBV AG SPEC QL: NEGATIVE
FLUAV+FLUBV AG SPEC QL: POSITIVE
S PYO AG THROAT QL IA.RAPID: NORMAL
SPECIMEN SOURCE: ABNORMAL

## 2019-04-03 PROCEDURE — 99284 EMERGENCY DEPT VISIT MOD MDM: CPT | Mod: Z6 | Performed by: STUDENT IN AN ORGANIZED HEALTH CARE EDUCATION/TRAINING PROGRAM

## 2019-04-03 PROCEDURE — 99284 EMERGENCY DEPT VISIT MOD MDM: CPT | Mod: 25 | Performed by: STUDENT IN AN ORGANIZED HEALTH CARE EDUCATION/TRAINING PROGRAM

## 2019-04-03 PROCEDURE — 87880 STREP A ASSAY W/OPTIC: CPT | Mod: QW | Performed by: INTERNAL MEDICINE

## 2019-04-03 PROCEDURE — 71046 X-RAY EXAM CHEST 2 VIEWS: CPT | Mod: TC

## 2019-04-03 PROCEDURE — 87804 INFLUENZA ASSAY W/OPTIC: CPT | Performed by: STUDENT IN AN ORGANIZED HEALTH CARE EDUCATION/TRAINING PROGRAM

## 2019-04-03 PROCEDURE — 99213 OFFICE O/P EST LOW 20 MIN: CPT | Performed by: INTERNAL MEDICINE

## 2019-04-03 PROCEDURE — 87081 CULTURE SCREEN ONLY: CPT | Performed by: INTERNAL MEDICINE

## 2019-04-03 PROCEDURE — 25000132 ZZH RX MED GY IP 250 OP 250 PS 637: Performed by: STUDENT IN AN ORGANIZED HEALTH CARE EDUCATION/TRAINING PROGRAM

## 2019-04-03 RX ORDER — ACETAMINOPHEN 325 MG/1
975 TABLET ORAL ONCE
Status: COMPLETED | OUTPATIENT
Start: 2019-04-03 | End: 2019-04-03

## 2019-04-03 RX ORDER — OSELTAMIVIR PHOSPHATE 75 MG/1
75 CAPSULE ORAL 2 TIMES DAILY
Qty: 10 CAPSULE | Refills: 0 | Status: SHIPPED | OUTPATIENT
Start: 2019-04-03 | End: 2019-06-12

## 2019-04-03 RX ADMIN — ACETAMINOPHEN 975 MG: 325 TABLET ORAL at 19:12

## 2019-04-03 NOTE — ED PROVIDER NOTES
History     Chief Complaint   Patient presents with     Chills     Cough     HPI  Valarie Talavera is a 28 year old female with past medical history which includes obesity and GERD who presents for evaluation of fever, chills, generalized body aches, sore throat and cough.  Patient explains that 3 days ago she developed the sore throat and cough, today has felt febrile despite home use of acetaminophen.  Records confirm that she was seen in clinic this morning for her symptoms, CBC without leukocytosis and rapid strep test negative, diagnosed with URI.  She works at a  and there have been recent exposures to influenza.  She continues to tolerate by mouth, no chest pain or difficulty breathing, and no vomiting.    Allergies:  Allergies   Allergen Reactions     Zithromax [Azithromycin] Other (See Comments)     hives       Problem List:    Patient Active Problem List    Diagnosis Date Noted     Obesity (BMI 35.0-39.9) with comorbidity (H) 03/15/2019     Priority: Medium     Hyperlipidemia LDL goal <160 03/15/2019     Priority: Medium     Bilateral carpal tunnel syndrome 06/15/2018     Priority: Medium     Cubital tunnel syndrome of both upper extremities 06/15/2018     Priority: Medium     Term pregnancy 10/14/2017     Priority: Medium     Gastroesophageal reflux disease with esophagitis 10/04/2017     Priority: Medium     Back pain 08/23/2017     Priority: Medium     Hyperglycemia 07/13/2017     Priority: Medium     Nuchal cord, not applicable or unspecified fetus 06/23/2017     Priority: Medium     Encounter for supervision of other normal pregnancy in third trimester 03/11/2017     Priority: Medium     Vitamin D deficiency 02/02/2016     Priority: Medium     HSIL on Pap smear of cervix 02/02/2016     Priority: Medium     2/2/16: Pap - HSIL. Plan colp  2/11/16 [place].  HSIL. Age 25  Plan: per tele enc on 2/18/16 [place] and cytology in August.  Will have pathologist differentiate between WESLY 2 and 3 if possible.   If lesion is worse, then LEEP.  If it is the same or better, then colp and cytology in 6 months.    8/30/16 HSIL pap/+ HR HPV (not 16 or 18). [place] not completed.  Per andie Spence pt. Schedule colp bef 11/30/16.  9/9/16 Pt. Advised.  9/22/16 [place]. NO SHOW.  10/6/16 [place] visit. NO SHOW.  12/8/16 Reminder letter.   1/11/17 [place] not done. Tracking updated for 6 mo colp/pap.   3/10/17 ASCUS pap/+ HR HPV (not 16 or 18). approx 9 weeks pregnant. Plan: colp around 20 weeks gestation, approx 5/26/17  10/14/17 delivered baby. (6 wk pp due 11/25/17)  3/25/19 [place] bx: normal, per provider. Plan: [facility] in 1 yr          Past Medical History:    Past Medical History:   Diagnosis Date     Anxiety      Depression with anxiety      Depressive disorder      HSIL on Pap smear of cervix 2/2/2016     Low blood pressure      Vitamin D deficiency        Past Surgical History:    Past Surgical History:   Procedure Laterality Date     NO HISTORY OF SURGERY         Family History:    Family History   Problem Relation Age of Onset     Hypertension No family hx of      Cerebrovascular Disease No family hx of      Hyperlipidemia No family hx of        Social History:  Marital Status:  Single [1]  Social History     Tobacco Use     Smoking status: Former Smoker     Types: Cigarettes     Smokeless tobacco: Never Used     Tobacco comment: very rare   Substance Use Topics     Alcohol use: No     Alcohol/week: 0.0 oz     Frequency: Never     Drug use: No        Medications:      oseltamivir (TAMIFLU) 75 MG capsule   cholecalciferol (VITAMIN D3) 5000 units (125 mcg) capsule   cyanocobalamin (VITAMIN B-12) 1000 MCG tablet   IBUPROFEN PO   magnesium 250 MG tablet   metroNIDAZOLE (METROGEL-VAGINAL) 0.75 % vaginal gel   NONFORMULARY   omeprazole (PRILOSEC) 20 MG CR capsule   Prenatal Multivit-Min-Fe-FA (PRENATAL VITAMINS PO)         Review of Systems  Constitutional: Positive for fever with chills.  HENT: Positive for sore throat.  Cardiovascular:   Negative for chest pain.  Respiratory: Positive for cough.  Negative for shortness of breath.  Gastrointestinal:  Negative for abdominal pain, nausea or vomiting.  Genitourinary:  Negative for dysuria, pelvic pain, vaginal bleeding or discharge.  Musculoskeletal: Generalized body aches.  Denies focal joint pain or swelling.  Skin:  Negative for skin lesions.    All others reviewed and are negative.      Physical Exam   BP: 123/70  Pulse: 98  Temp: 100.2  F (37.9  C)  Resp: 16  Weight: 109 kg (240 lb 5 oz)  SpO2: 98 %      Physical Exam  Constitutional:  Well developed, well nourished.  Appears nontoxic and in no acute distress.   HENT:  Normocephalic and atraumatic.  Symmetric in appearance.  Eyes:  Conjunctivae are normal.  Oral:  Patient is without trismus.  Moist oral mucosa.  No uvular asymmetry.  Mild pharyngeal erythema without exudate.  Without sublingual or submental swelling.  Voice is not muffled.   Neck:  Neck supple without nuchal rigidity.  Cardiovascular:  No cyanosis.  RRR.  No audible murmurs noted.  No lower extremity edema or asymmetry.   Respiratory:  Effort normal without sign of respiratory distress.  CTAB without diminished regions.  No wheezing, rhonchi, or crackles.   Gastrointestinal:  Soft nondistended abdomen.  Nontender and without guarding.  No rigidity or rebound tenderness.  Negative Marcelo's sign.  Negative McBurney's point.    Musculoskeletal:  Moves extremities spontaneously.  Neurological:  Patient is alert.  Skin:  Skin is warm and dry.  Psychiatric:  Normal mood and affect.      ED Course        Procedures              Critical Care time:  none               Results for orders placed or performed during the hospital encounter of 04/03/19 (from the past 24 hour(s))   Influenza A/B antigen   Result Value Ref Range    Influenza A/B Agn Specimen Nasopharyngeal     Influenza A Negative NEG^Negative    Influenza B Positive (A) NEG^Negative   XR Chest 2 Views    Narrative    CHEST TWO  VIEW 4/3/2019 6:34 PM     COMPARISON: None.    HISTORY: Cough, fever.    FINDINGS: The cardiac silhouette, pulmonary vasculature, lungs and  pleural spaces are within normal limits.      Impression    IMPRESSION: Clear lungs.       Medications - No data to display    Assessments & Plan (with Medical Decision Making)   Valarie Talavera is a 28 year old female who presents to the department for evaluation of sore throat, body aches, fever and cough.  Differential diagnosis included meningitis, retropharyngeal abscess, epiglottitis, bronchitis, pneumonia or viral respiratory infection.  Lung sounds clear to auscultation and chest radiograph independently reviewed without sign of infiltrate.  Mild pharyngeal erythema but recent rapid testing negative for strep pharyngitis.  However influenza testing has returned positive for influenza B.  Symptoms have been has not for 48-72 hours, will be prescribed Tamiflu but strongly encouraged to continue with oral hydration and OTC antipyretics as directed.  She is agreeable discharge plan including return instructions for difficulty breathing, lethargy, dehydration, or any other concerns.        Disclaimer:  This note consists of symbols derived from keyboarding, dictation, and/or voice recognition software.  As a result, there may be errors in the script that have gone undetected.  Please consider this when interpreting information found in the chart.        I have reviewed the nursing notes.    I have reviewed the findings, diagnosis, plan and need for follow up with the patient.          Medication List      Started    oseltamivir 75 MG capsule  Commonly known as:  TAMIFLU  75 mg, Oral, 2 TIMES DAILY            Final diagnoses:   Influenza B       4/3/2019   [facility] EMERGENCY DEPARTMENT     Hans Bailey DO  04/03/19 3011

## 2019-04-03 NOTE — LETTER
April 3, 2019      To Whom It May Concern:      Valarie Talavera was seen in our Emergency Department today, 04/03/19.  I expect her condition to improve over the next 3-5 days.  She may return to work when symptoms have resolved.    Sincerely,        Hans Bailey, DO

## 2019-04-03 NOTE — ED AVS SNAPSHOT
[facility] Emergency Department  [address] [place] [address]  Phone:  [phone]  Fax:  [phone]                                    Valarie Talavera   MRN: [id number]    Department:  [facility] Emergency Department   Date of Visit:  4/3/2019           After Visit Summary Signature Page    I have received my discharge instructions, and my questions have been answered. I have discussed any challenges I see with this plan with the nurse or doctor.    ..........................................................................................................................................  Patient/Patient Representative Signature      ..........................................................................................................................................  Patient Representative Print Name and Relationship to Patient    ..................................................               ................................................  Date                                   Time    ..........................................................................................................................................  Reviewed by Signature/Title    ...................................................              ..............................................  Date                                               Time          22EPIC Rev 08/18

## 2019-04-04 ENCOUNTER — PATIENT OUTREACH (OUTPATIENT)
Dept: CARE COORDINATION | Facility: CLINIC | Age: 29
End: 2019-04-04

## 2019-04-04 NOTE — PROGRESS NOTES
Clinic Care Coordination Contact  [facility]/Voicemail    Referral Source: ED Follow-Up  Clinical Data: Care Coordinator Outreach  Outreach attempted x 1.  Left message on voicemail with call back information and requested return call.  Plan:  Care Coordinator will try to reach patient again in 1-2 business days.

## 2019-04-05 LAB
BACTERIA SPEC CULT: NORMAL
SPECIMEN SOURCE: NORMAL

## 2019-04-11 ENCOUNTER — PATIENT OUTREACH (OUTPATIENT)
Dept: CARE COORDINATION | Facility: CLINIC | Age: 29
End: 2019-04-11

## 2019-04-11 NOTE — LETTER
[facility]  [address] [place] [address]    April 11, 2019    Valarie Talavera  [address] [address] [place] [address]      Dear Valarie,    I am a clinic care coordinator who works with Luis Crisostomo MD. I wanted to introduce myself and provide you with my contact information for you to be able to call me with any questions or concerns. I wanted to introduce myself and provide you with my contact information so that you can call me with questions or concerns about your health care. Below is a description of clinic care coordination and how I can further assist you.     The clinic care coordinator is a registered nurse and/or  who understand the health care system. The goal of clinic care coordination is to help you manage your health and improve access to the [facility] system in the most efficient manner. The registered nurse can assist you in meeting your health care goals by providing education, coordinating services, and strengthening the communication among your providers. The  can assist you with financial, behavioral, psychosocial, chemical dependency, counseling, and/or psychiatric resources.    Please feel free to contact me at [phone], with any questions or concerns. We at [facility] are focused on providing you with the highest-quality healthcare experience possible and that all starts with you.     Sincerely,       Kiah Rojo RN  Clinic Care Coordinator   Phone: [phone]

## 2019-04-11 NOTE — PROGRESS NOTES
Clinic Care Coordination Contact  [facility]/Voicemail       Clinical Data: Care Coordinator Outreach  Outreach attempted x 2.  Left message on voicemail with call back information and requested return call.  Plan: Care Coordinator will mail out care coordination introduction letter with care coordinator contact information and explanation of care coordination services. Care Coordinator will try to reach patient again in 3-5 business days.

## 2019-04-24 ENCOUNTER — TELEPHONE (OUTPATIENT)
Dept: FAMILY MEDICINE | Facility: OTHER | Age: 29
End: 2019-04-24

## 2019-04-24 NOTE — TELEPHONE ENCOUNTER
Panel Management Review      Patient has the following on her problem list: None      Composite cancer screening  Chart review shows that this patient is due/due soon for the following None  Summary:    Patient is due/failing the following:   LDL    Action needed:   Patient needs fasting lab only appointment    Type of outreach:    Phone, left message for patient to call back.     Questions for provider review:    None                                                                                                                                    Zeenat Wren CMA     Chart routed to Care Team .

## 2019-04-25 NOTE — TELEPHONE ENCOUNTER
Left message for patient to return call to clinic. When call is returned please see message below.     Checo Malik,

## 2019-04-29 ENCOUNTER — PATIENT OUTREACH (OUTPATIENT)
Dept: CARE COORDINATION | Facility: CLINIC | Age: 29
End: 2019-04-29

## 2019-04-29 NOTE — PROGRESS NOTES
Care Coordination Contact Attempt    Referral Source: Emergency Department  Follow up     Clinical Data: Clinic Care Coordinator RN attempted to contact patient .  Unable to contact letter was sent to patient . There has been no response from patient.        Plan:  Case closed.

## 2019-06-12 ENCOUNTER — OFFICE VISIT (OUTPATIENT)
Dept: FAMILY MEDICINE | Facility: CLINIC | Age: 29
End: 2019-06-12
Payer: MEDICAID

## 2019-06-12 DIAGNOSIS — M65.4 TENOSYNOVITIS, DE QUERVAIN: Primary | ICD-10-CM

## 2019-06-12 PROCEDURE — 99213 OFFICE O/P EST LOW 20 MIN: CPT | Performed by: FAMILY MEDICINE

## 2019-06-12 NOTE — PROGRESS NOTES
Subjective     Valarie Talavera is a 28 year old female who presents to clinic today for the following health issues:    HPI   Joint Pain    Onset: 1 week    Description:   Location: right wrist  Character: Sharp and Stabbing    Intensity: moderate    Progression of Symptoms: worse    Accompanying Signs & Symptoms:  Other symptoms: radiation of pain to fingers from wrist and weakness of right wrist    History:   Previous similar pain: YES- hx of carpal tunnel but getting worse      Precipitating factors:   Trauma or overuse: no     Alleviating factors:  Improved by: support wrap    Therapies Tried and outcome: ice, ibuprofen, Tiger Balm    Patient having pain bilaterally at base of right thumb.  Worse with flexion of wrist.  Pain with palpation of the radial side of wrist.  No known trauma or injury.                Reviewed and updated as needed this visit by Provider  Tobacco  Allergies  Meds  Problems  Med Hx  Surg Hx  Fam Hx         Review of Systems   ROS COMP: Constitutional, HEENT, cardiovascular, pulmonary, gi and gu systems are negative, except as otherwise noted.      Objective    There were no vitals taken for this visit.  There is no height or weight on file to calculate BMI.  Physical Exam   Constitutional: She appears well-developed and well-nourished.   Musculoskeletal:   Wrist has no obvious deformity and minimal swelling at base of thumb.  No bony tenderness and no CMC tenderness with range of motion of thumb.      Positive finkelstein test.  Tender to palpation of the extensor tendon of thumb over the radial side of wrist.  Pain is reproducible.  Pain with flexion of wrist.  No pain with extension.                    Assessment & Plan     ASSESSMENT/ORDERS:    ICD-10-CM    1. Tenosynovitis, de Quervain (right hand) M65.4 OCCUPATIONAL THERAPY REFERRAL     CANCELED: OCCUPATIONAL THERAPY REFERRAL     PLAN:  1.  Referral to hand therapy.  We tried pre-skinny thumb spica splint in the clinic but not  "helpful for pain relief.  Will see if therapist can create custom splint for thumb.        BMI:   Estimated body mass index is 38.21 kg/m  as calculated from the following:    Height as of 1/29/19: 1.689 m (5' 6.5\").    Weight as of 4/3/19: 109 kg (240 lb 5 oz).               No follow-ups on file.    Zac Welch MD  [facility]      "

## 2019-06-18 ENCOUNTER — OFFICE VISIT (OUTPATIENT)
Dept: URGENT CARE | Facility: RETAIL CLINIC | Age: 29
End: 2019-06-18
Payer: MEDICAID

## 2019-06-18 VITALS
OXYGEN SATURATION: 97 % | HEART RATE: 63 BPM | DIASTOLIC BLOOD PRESSURE: 63 MMHG | TEMPERATURE: 98.3 F | SYSTOLIC BLOOD PRESSURE: 117 MMHG

## 2019-06-18 DIAGNOSIS — J02.9 ACUTE PHARYNGITIS, UNSPECIFIED ETIOLOGY: Primary | ICD-10-CM

## 2019-06-18 DIAGNOSIS — B34.9 VIRAL SYNDROME: ICD-10-CM

## 2019-06-18 LAB — S PYO AG THROAT QL IA.RAPID: NORMAL

## 2019-06-18 PROCEDURE — 87880 STREP A ASSAY W/OPTIC: CPT | Performed by: FAMILY MEDICINE

## 2019-06-18 PROCEDURE — 99213 OFFICE O/P EST LOW 20 MIN: CPT | Performed by: FAMILY MEDICINE

## 2019-06-18 PROCEDURE — 87081 CULTURE SCREEN ONLY: CPT | Performed by: FAMILY MEDICINE

## 2019-06-18 NOTE — PATIENT INSTRUCTIONS
"  Patient Education     Viral Syndrome (Adult)  A viral illness may cause a number of symptoms such as fever. Other symptoms depend on the part of the body that the virus affects. If it settles in your nose, throat, and lungs, it may cause cough, sore throat, congestion, runny nose, headache, earache and other ear symptoms, or shortness of breath. If it settles in your stomach and intestinal tract, it may cause nausea, vomiting, cramping, and diarrhea. Sometimes it causes generalized symptoms like \"aching all over,\" feeling tired, loss of energy, or loss of appetite.  A viral illness usually lasts anywhere from several days to several weeks, but sometimes it lasts longer. In some cases, a more serious infection can look like a viral syndrome in the first few days of the illness. You may need another exam and additional tests to know the difference. Watch for the warning signs listed below for when to seek medical advice.  Home care  Follow these guidelines for taking care of yourself at home:    If symptoms are severe, rest at home for the first 2 to 3 days.    Stay away from cigarette smoke - both your smoke and the smoke from others.    You may use over-the-counter acetaminophen or ibuprofen for fever, muscle aching, and headache, unless another medicine was prescribed for this. If you have chronic liver or kidney disease or ever had a stomach ulcer or gastrointestinal bleeding, talk with your healthcare provider before using these medicines. No one who is younger than 18 and ill with a fever should take aspirin. It may cause severe disease or death.    Your appetite may be poor, so a light diet is fine. Avoid dehydration by drinking 8 to 12, 8-ounce glasses of fluids each day. This may include water; orange juice; lemonade; apple, grape, and cranberry juice; clear fruit drinks; electrolyte replacement and sports drinks; and decaffeinated teas and coffee. If you have been diagnosed with a kidney disease, ask your " healthcare provider how much and what types of fluids you should drink to prevent dehydration. If you have kidney disease, drinking too much fluid can cause it build up in the your body and be dangerous to your health.    Over-the-counter remedies won't shorten the length of the illness but may be helpful for symptoms such as cough, sore throat, nasal and sinus congestion, or diarrhea. Don't use decongestants if you have high blood pressure.  Follow-up care  Follow up with your healthcare provider if you do not improve over the next week.  Call 911  Call 911 if any of the following occur:    Convulsion    Feeling weak, dizzy, or like you are going to faint    Chest pain, or more than mild shortness of breath  When to seek medical advice  Call your healthcare provider right away if any of these occur:    Cough with lots of colored sputum (mucus) or blood in your sputum    Chest pain, shortness of breath, wheezing, or trouble breathing    Severe headache; face, neck, or ear pain    Severe, constant pain in the lower right side of your belly (abdominal)    Continued vomiting (can t keep liquids down)    Frequent diarrhea (more than 5 times a day); blood (red or black color) or mucus in diarrhea    Feeling weak, dizzy, or like you are going to faint    Extreme thirst    Fever of 100.4 F (38 C) or higher, or as directed by your healthcare provider  Date Last Reviewed: 4/1/2018 2000-2018 [employer]. [address] [place] [address]. All rights reserved. This information is not intended as a substitute for professional medical care. Always follow your healthcare professional's instructions.

## 2019-06-18 NOTE — PROGRESS NOTES
SUBJECTIVE:  Valarie Talavera is a 28 year old female with a chief complaint of sore throat.  Onset of symptoms was 1 day(s) ago.    Course of illness: still present and worsening.  Severity moderate  Current and Associated symptoms: fever, chills, fatigue and nausea  Treatment measures tried include Tylenol/Ibuprofen.  Predisposing factors include exposure to strep.    Past Medical History:   Diagnosis Date     Anxiety      Depression with anxiety      Depressive disorder      HSIL on Pap smear of cervix 2/2/2016    colp scheduled 2/11/16     Low blood pressure      Vitamin D deficiency      Current Outpatient Medications   Medication Sig Dispense Refill     cholecalciferol (VITAMIN D3) 5000 units (125 mcg) capsule Take 5,000 Units by mouth daily       cyanocobalamin (VITAMIN B-12) 1000 MCG tablet Take by mouth daily       IBUPROFEN PO Take 800 mg by mouth       magnesium 250 MG tablet Take 1 tablet by mouth daily       NONFORMULARY        omeprazole (PRILOSEC) 20 MG CR capsule Take 1 capsule (20 mg) by mouth daily 90 capsule 1     Prenatal Multivit-Min-Fe-FA (PRENATAL VITAMINS PO) Take 1 tablet by mouth daily       History   Smoking Status     Former Smoker     Types: Cigarettes   Smokeless Tobacco     Never Used     Comment: very rare       ROS:  Review of systems negative except as stated above.    OBJECTIVE:   /63   Pulse 63   Temp 98.3  F (36.8  C) (Oral)   SpO2 97%   GENERAL APPEARANCE: mild distress and cooperative  EYES: EOMI,  PERRL, conjunctiva clear  HENT: TM's normal bilaterally and tonsillar erythema  NECK: supple, non-tender to palpation, no adenopathy noted  RESP: lungs clear to auscultation - no rales, rhonchi or wheezes  CV: regular rates and rhythm, normal S1 S2, no murmur noted  ABDOMEN:  soft, nontender, no HSM or masses and bowel sounds normal  SKIN: no suspicious lesions or rashes    Rapid Strep test is negative; await throat culture results.    ASSESSMENT:     Acute pharyngitis,  unspecified etiology  Viral syndrome    PLAN:   Symptomatic treat with gargles, lozenges, and OTC analgesic as needed.   Follow-up with primary care provider if not improving.

## 2019-06-18 NOTE — LETTER
June 18, 2019      Valarie Talavera  [address] [address] [place] [address]        To Whom It May Concern:    Valarie Talavera was seen in our clinic. She may return to work after one day well at home.       Sincerely,        Jesus Jamison MD

## 2019-06-20 LAB
BACTERIA SPEC CULT: NORMAL
SPECIMEN SOURCE: NORMAL

## 2019-07-28 ENCOUNTER — NURSE TRIAGE (OUTPATIENT)
Dept: NURSING | Facility: CLINIC | Age: 29
End: 2019-07-28

## 2019-07-29 ENCOUNTER — OFFICE VISIT (OUTPATIENT)
Dept: URGENT CARE | Facility: RETAIL CLINIC | Age: 29
End: 2019-07-29
Payer: MEDICAID

## 2019-07-29 VITALS — TEMPERATURE: 98 F | SYSTOLIC BLOOD PRESSURE: 111 MMHG | HEART RATE: 76 BPM | DIASTOLIC BLOOD PRESSURE: 70 MMHG

## 2019-07-29 DIAGNOSIS — J02.9 ACUTE PHARYNGITIS, UNSPECIFIED ETIOLOGY: Primary | ICD-10-CM

## 2019-07-29 DIAGNOSIS — B27.80 INFECTIOUS MONONUCLEOSIS-LIKE SYNDROME: ICD-10-CM

## 2019-07-29 LAB — S PYO AG THROAT QL IA.RAPID: NEGATIVE

## 2019-07-29 PROCEDURE — 87880 STREP A ASSAY W/OPTIC: CPT | Mod: QW | Performed by: FAMILY MEDICINE

## 2019-07-29 PROCEDURE — 87081 CULTURE SCREEN ONLY: CPT | Performed by: FAMILY MEDICINE

## 2019-07-29 PROCEDURE — 99213 OFFICE O/P EST LOW 20 MIN: CPT | Performed by: FAMILY MEDICINE

## 2019-07-29 NOTE — PROGRESS NOTES
SUBJECTIVE:  Valarie Talavera is a 28 year old female with a chief complaint of fevers, chills,  fatigue, sore throat, sore neck and  Some generalized body aches.  Onset of symptoms was 6 day(s) ago.    Course of illness: sudden onset, still present and constant.  Severity moderate  Current and Associated symptoms: fever, sore throat, fatigue and vomiting x 1.  She has almost fallen asleep at work and driving from excessive fatigue (only been present with this illness)  Treatment measures tried include Tylenol/Ibuprofen.  Predisposing factors include works in  setting.  History of previous mononucleosis: no    Past Medical History:   Diagnosis Date     Anxiety      Depression with anxiety      Depressive disorder      HSIL on Pap smear of cervix 2/2/2016    colp scheduled 2/11/16     Low blood pressure      Vitamin D deficiency      Patient Active Problem List   Diagnosis     Vitamin D deficiency     HSIL on Pap smear of cervix     Encounter for supervision of other normal pregnancy in third trimester     Nuchal cord, not applicable or unspecified fetus     Hyperglycemia     Back pain     Gastroesophageal reflux disease with esophagitis     Term pregnancy     Bilateral carpal tunnel syndrome     Cubital tunnel syndrome of both upper extremities     Obesity (BMI 35.0-39.9) with comorbidity (H)     Hyperlipidemia LDL goal <160     Tenosynovitis, de Quervain (right hand)       ALLERGIES:  Zithromax [azithromycin]        Current Outpatient Medications on File Prior to Visit:  cholecalciferol (VITAMIN D3) 5000 units (125 mcg) capsule Take 5,000 Units by mouth daily   cyanocobalamin (VITAMIN B-12) 1000 MCG tablet Take by mouth daily   magnesium 250 MG tablet Take 1 tablet by mouth daily   NONFORMULARY    IBUPROFEN PO Take 800 mg by mouth   omeprazole (PRILOSEC) 20 MG CR capsule Take 1 capsule (20 mg) by mouth daily (Patient not taking: Reported on 7/29/2019)   Prenatal Multivit-Min-Fe-FA (PRENATAL VITAMINS PO)  Take 1 tablet by mouth daily     No current facility-administered medications on file prior to visit.     Social History     Tobacco Use     Smoking status: Former Smoker     Types: Cigarettes     Smokeless tobacco: Never Used     Tobacco comment: very rare   Substance Use Topics     Alcohol use: No     Alcohol/week: 0.0 oz     Frequency: Never       Family History   Problem Relation Age of Onset     Hypertension No family hx of      Cerebrovascular Disease No family hx of      Hyperlipidemia No family hx of          ROS:  CONSTITUTIONAL:  fever, chills,    INTEGUMENTARY/SKIN: NEGATIVE for worrisome rashes, or lesions  EYES: NEGATIVE for vision changes or irritation  ENT/MOUTH: NEGATIVE for ear, mouth  Problems-  Has sore throat  RESP:NEGATIVE for significant cough or SOB  GI: NEGATIVE for abdominal pain , or change in bowel habits    OBJECTIVE:   /70 (BP Location: Right arm)   Pulse 76   Temp 98  F (36.7  C) (Tympanic)   GENERAL APPEARANCE: alert, moderate distress, cooperative and fatigued  EYES: EOMI,  PERRL, conjunctiva clear  HENT: ear canals and TM's normal.  Nose normal.  Pharynx erythematous with some exudate noted.  NECK: supple, non-tender to palpation, no adenopathy noted  RESP: lungs clear to auscultation - no rales, rhonchi or wheezes  CV: regular rates and rhythm, normal S1 S2, no murmur noted  ABDOMEN:  soft, nontender, no HSM or masses and bowel sounds normal  SKIN: no suspicious lesions or rashes    Results for orders placed or performed in visit on 07/29/19   RAPID STREP SCREEN   Result Value Ref Range    Rapid Strep A Screen negative neg        ASSESSMENT:  Acute pharyngitis, unspecified etiology     - RAPID STREP SCREEN  - BETA STREP GROUP A R/O CULTURE    Infectious mononucleosis-like syndrome     I discussed with the patient that a  Mononucleosis type illness can be caused by the Eris Barr virus, the Cytomegalovirus and sometimes by other cold/ respiratory infections.  These  infections can sometimes be prolonged with fatigue that can last 1-2 months, and sometimes liver inflammation can occur .  The testing for these illnesses can be challenging because blood tests may not show until 2-4 weeks that the illness is present       All of these are   viral illnesses  that do  not have a cure .  The patient is encouraged to rest as much as possible to focus the body's energy on recovering from the illness. Symptomatic treatment with gargles, lozenges, and OTC analgesic as needed. Follow-up with primary clinic if not improving.  Go to the ER if the throat swelling causes respiratory difficulties, if jaundiced, or  if unable to keep down oral fluids .      We discussed that the patient's saliva is infectious  and that oral contact or sharing eating utensils can transmit the disease.        A note for   work was provided.

## 2019-07-29 NOTE — TELEPHONE ENCOUNTER
"Patient calling about excessive fatigue the past few days. Nothing has changed tonight but it is not resolving. Also has sore throat.     Reason for Disposition    [1] MODERATE weakness (i.e., interferes with work, school, normal activities) AND [2] cause unknown  (Exceptions: weakness with acute minor illness, or weakness from poor fluid intake)    Additional Information    Negative: Severe difficulty breathing (e.g., struggling for each breath, speaks in single words)    Negative: Shock suspected (e.g., cold/pale/clammy skin, too weak to stand, low BP, rapid pulse)    Negative: Difficult to awaken or acting confused (e.g., disoriented, slurred speech)    Negative: [1] Fainted > 15 minutes ago AND [2] still feels too weak or dizzy to stand    Negative: [1] SEVERE weakness (i.e., unable to walk or barely able to walk, requires support) AND     [2] new onset or worsening    Negative: Sounds like a life-threatening emergency to the triager    Negative: Weakness of the face, arm or leg on one side of the body    Negative: [1] Has diabetes (diabetes mellitus) AND [2] weakness from low blood sugar (i.e., < 60 mg/dl or 3.5 mmol/l)    Negative: Heat exhaustion suspected (i.e., dehydration from heat exposure)    Negative: Vomiting is main symptom    Negative: Diarrhea is main symptom    Negative: Difficulty breathing    Negative: Heart beating < 50 beats per minute OR > 140 beats per minute    Negative: Extra heart beats OR irregular heart beating   (i.e., \"palpitations\")    Negative: Follows bleeding (e.g., from vomiting, rectum, vagina; EXCEPTION: small brief weakness from sight of a small amount blood)    Negative: Black or tarry bowel movements    Negative: [1] Drinking very little AND [2] dehydration suspected (e.g., no urine > 12 hours, very dry mouth, very lightheaded)    Negative: Patient sounds very sick or weak to the triager    Protocols used: WEAKNESS (GENERALIZED) AND FATIGUE-A-AH      "

## 2019-07-29 NOTE — PATIENT INSTRUCTIONS
Patient Education     Mononucleosis  Mononucleosis (also called mono) is a contagious viral infection. Most infants and children exposed to the virus get only mild flu-like symptoms or no symptoms at all. However, infection is usually more serious in teens and young adults. While the virus is active, it causes symptoms and can spread to others. After symptoms subside, the virus stays in the body and eventually becomes inactive. The virus can reactivate and develop symptoms, especially in people with weak immune systems.  The virus is usually spread by contact with saliva, often by kissing, or sharing food or eating utensils. It may also spread by breastmilk, blood, or sexual contact. It takes about 4 to 6 weeks to develop symptoms after exposure.  Early symptoms include headache, nausea, tiredness and general muscle aching. This is followed by sore throat and fever. Lymph glands in the neck, under the arms, or in the groin may be swollen. Symptoms usually go away in about 1 to 2 months. But they can last up to 4 months.  In the first few days to weeks, a common blood test (the monospot test) us ed to diagnose this disease may be negative even though you have the illness. In this case, other tests may be done.  Taking the antibiotics ampicillin or amoxicillin during a mono infection may cause a skin rash. This is not serious and will fade in about a week. The rash may not mean you are having an allergic reaction to the antibiotic.  Mono can cause your spleen to swell. The spleen is a fist-sized organ in the upper left abdomen that stores red blood cells. Injury to a swollen spleen can cause the spleen to rupture. This can cause life-threatening internal bleeding. To prevent this from happening, don't play contact sports or do strenuous activity for 8 weeks, or until your healthcare provider says it's OK. A sharp blow or pressure to the area could rupture a swollen spleen  Home care    Rest in bed until the fever  and weakness have gone away.    Drink plenty of fluids, but don't drink alcohol. Otherwise, you may eat a regular diet.    Ask your healthcare provider about using over-the-counter medicines to treat symptoms such as fever, pain, or an itchy rash.    Over-the-counter throat lozenges may help soothe a sore throat. Gargling with warm salt water (1/2 teaspoon in 1 glass of warm water) may also be soothing to the throat.    You may return to work or school after the fever goes away and you are feeling better. Continue to follow any activity restrictions you have been given.  Preventing spread of the virus  To limit the spread of the virus, don't expose others to your saliva for at least 6 months after your illness (no kissing or sharing utensils, drinking glasses, or toothbrushes).  Follow-up care  Follow up with your healthcare provider within 1 to 2 weeks, or as advised to be sure that there are no complications. If symptoms of extreme fatigue and swollen glands last longer than 6 months, see your healthcare provider for further testing.  When to seek medical advice  Call your healthcare provider right away if any of the following occur:    Excessive coughing    Yellow skin or eyes    Trouble swallowing    Dizziness    Paleness  Call 911  Call 911 if any of the following occur:    Severe or worsening abdominal pain    Trouble breathing  Date Last Reviewed: 3/1/2018    [id number] [employer]. [address] [place] [address]. All rights reserved. This information is not intended as a substitute for professional medical care. Always follow your healthcare professional's instructions.

## 2019-07-29 NOTE — LETTER
[facility]  [address] [facility] [address]  [phone]      July 29, 2019    RE:  Valarie Talavera                                                                                                                                                       [address]  [facility] [address]            To whom it may concern:    Valarie Talavera is under my professional care for    Acute pharyngitis, unspecified etiology  Infectious mononucleosis-like syndrome.   She  may return to work with the following: No restrictions on or about 7/31/2019.          Sincerely,        Geri Pizarro MD    [facility]

## 2019-07-31 LAB
BACTERIA SPEC CULT: NORMAL
SPECIMEN SOURCE: NORMAL

## 2020-02-17 ENCOUNTER — HOSPITAL ENCOUNTER (EMERGENCY)
Facility: CLINIC | Age: 30
Discharge: HOME | End: 2020-02-17
Attending: PHYSICIAN ASSISTANT | Admitting: PHYSICIAN ASSISTANT
Payer: MEDICAID

## 2020-02-17 VITALS
TEMPERATURE: 99 F | DIASTOLIC BLOOD PRESSURE: 64 MMHG | RESPIRATION RATE: 20 BRPM | OXYGEN SATURATION: 95 % | WEIGHT: 250 LBS | SYSTOLIC BLOOD PRESSURE: 120 MMHG | HEART RATE: 99 BPM | BODY MASS INDEX: 39.75 KG/M2

## 2020-02-17 DIAGNOSIS — J02.9 ACUTE PHARYNGITIS, UNSPECIFIED ETIOLOGY: ICD-10-CM

## 2020-02-17 LAB
ANION GAP SERPL CALCULATED.3IONS-SCNC: 6 MMOL/L (ref 3–14)
BASOPHILS # BLD AUTO: 0 10E9/L (ref 0–0.2)
BASOPHILS NFR BLD AUTO: 0.1 %
BUN SERPL-MCNC: 10 MG/DL (ref 7–30)
CALCIUM SERPL-MCNC: 8.9 MG/DL (ref 8.5–10.1)
CHLORIDE SERPL-SCNC: 108 MMOL/L (ref 94–109)
CO2 SERPL-SCNC: 24 MMOL/L (ref 20–32)
CREAT SERPL-MCNC: 0.54 MG/DL (ref 0.52–1.04)
CRP SERPL-MCNC: 63.9 MG/L (ref 0–8)
DIFFERENTIAL METHOD BLD: NORMAL
EOSINOPHIL NFR BLD AUTO: 0.2 %
ERYTHROCYTE [DISTWIDTH] IN BLOOD BY AUTOMATED COUNT: 12.9 % (ref 10–15)
FLUAV+FLUBV AG SPEC QL: NEGATIVE
FLUAV+FLUBV AG SPEC QL: NEGATIVE
GFR SERPL CREATININE-BSD FRML MDRD: >90 ML/MIN/{1.73_M2}
GLUCOSE SERPL-MCNC: 117 MG/DL (ref 70–99)
HCT VFR BLD AUTO: 40.1 % (ref 35–47)
HETEROPH AB SER QL: NEGATIVE
HGB BLD-MCNC: 13.7 G/DL (ref 11.7–15.7)
IMM GRANULOCYTES # BLD: 0 10E9/L (ref 0–0.4)
IMM GRANULOCYTES NFR BLD: 0.2 %
LYMPHOCYTES # BLD AUTO: 1.4 10E9/L (ref 0.8–5.3)
LYMPHOCYTES NFR BLD AUTO: 14.7 %
MCH RBC QN AUTO: 30 PG (ref 26.5–33)
MCHC RBC AUTO-ENTMCNC: 34.2 G/DL (ref 31.5–36.5)
MCV RBC AUTO: 88 FL (ref 78–100)
MONOCYTES # BLD AUTO: 1 10E9/L (ref 0–1.3)
MONOCYTES NFR BLD AUTO: 10 %
NEUTROPHILS # BLD AUTO: 7.2 10E9/L (ref 1.6–8.3)
NEUTROPHILS NFR BLD AUTO: 74.8 %
NRBC # BLD AUTO: 0 10*3/UL
NRBC BLD AUTO-RTO: 0 /100
PLATELET # BLD AUTO: 293 10E9/L (ref 150–450)
POTASSIUM SERPL-SCNC: 3.4 MMOL/L (ref 3.4–5.3)
RBC # BLD AUTO: 4.57 10E12/L (ref 3.8–5.2)
SODIUM SERPL-SCNC: 138 MMOL/L (ref 133–144)
SPECIMEN SOURCE: NORMAL
WBC # BLD AUTO: 9.7 10E9/L (ref 4–11)

## 2020-02-17 PROCEDURE — 87651 STREP A DNA AMP PROBE: CPT | Performed by: FAMILY MEDICINE

## 2020-02-17 PROCEDURE — 80048 BASIC METABOLIC PNL TOTAL CA: CPT | Performed by: PHYSICIAN ASSISTANT

## 2020-02-17 PROCEDURE — 87804 INFLUENZA ASSAY W/OPTIC: CPT | Performed by: PHYSICIAN ASSISTANT

## 2020-02-17 PROCEDURE — 25000132 ZZH RX MED GY IP 250 OP 250 PS 637: Performed by: PHYSICIAN ASSISTANT

## 2020-02-17 PROCEDURE — 85025 COMPLETE CBC W/AUTO DIFF WBC: CPT | Performed by: PHYSICIAN ASSISTANT

## 2020-02-17 PROCEDURE — 86140 C-REACTIVE PROTEIN: CPT | Performed by: PHYSICIAN ASSISTANT

## 2020-02-17 PROCEDURE — 40001204 ZZHCL STATISTIC STREP A RAPID: Performed by: FAMILY MEDICINE

## 2020-02-17 PROCEDURE — 99283 EMERGENCY DEPT VISIT LOW MDM: CPT | Performed by: PHYSICIAN ASSISTANT

## 2020-02-17 PROCEDURE — 25000131 ZZH RX MED GY IP 250 OP 636 PS 637: Performed by: PHYSICIAN ASSISTANT

## 2020-02-17 PROCEDURE — 99284 EMERGENCY DEPT VISIT MOD MDM: CPT | Mod: Z6 | Performed by: PHYSICIAN ASSISTANT

## 2020-02-17 PROCEDURE — 86308 HETEROPHILE ANTIBODY SCREEN: CPT | Performed by: PHYSICIAN ASSISTANT

## 2020-02-17 RX ORDER — PENICILLIN V POTASSIUM 500 MG/1
500 TABLET, FILM COATED ORAL 4 TIMES DAILY
Qty: 40 TABLET | Refills: 0 | Status: SHIPPED | OUTPATIENT
Start: 2020-02-17 | End: 2020-02-27

## 2020-02-17 RX ORDER — ACETAMINOPHEN 325 MG/1
975 TABLET ORAL ONCE
Status: COMPLETED | OUTPATIENT
Start: 2020-02-17 | End: 2020-02-17

## 2020-02-17 RX ORDER — IBUPROFEN 600 MG/1
600 TABLET, FILM COATED ORAL ONCE
Status: COMPLETED | OUTPATIENT
Start: 2020-02-17 | End: 2020-02-17

## 2020-02-17 RX ADMIN — IBUPROFEN 600 MG: 600 TABLET ORAL at 19:42

## 2020-02-17 RX ADMIN — ACETAMINOPHEN 975 MG: 325 TABLET, FILM COATED ORAL at 19:42

## 2020-02-17 RX ADMIN — DEXAMETHASONE 10 MG: 2 TABLET ORAL at 20:48

## 2020-02-17 NOTE — ED AVS SNAPSHOT
[facility] Emergency Department  [address] [place] [address]  Phone:  [phone]  Fax:  [phone]                                    Valarie Talavera   MRN: [id number]    Department:  [facility] Emergency Department   Date of Visit:  2/17/2020           After Visit Summary Signature Page    I have received my discharge instructions, and my questions have been answered. I have discussed any challenges I see with this plan with the nurse or doctor.    ..........................................................................................................................................  Patient/Patient Representative Signature      ..........................................................................................................................................  Patient Representative Print Name and Relationship to Patient    ..................................................               ................................................  Date                                   Time    ..........................................................................................................................................  Reviewed by Signature/Title    ...................................................              ..............................................  Date                                               Time          22EPIC Rev 08/18

## 2020-02-17 NOTE — LETTER
February 17, 2020      Valarie Talavera  [id number] [address] [place] [id number]        To Whom It May Concern:    Valarie Talavera  was seen on 2/17/2020.  Please excuse her from 2/17-2/18 due to acute illness.        Sincerely,          Sylvia Moura PA-C

## 2020-02-18 LAB
DEPRECATED S PYO AG THROAT QL EIA: NEGATIVE
SPECIMEN SOURCE: NORMAL
SPECIMEN SOURCE: NORMAL
STREP GROUP A PCR: NOT DETECTED

## 2020-02-18 NOTE — ED TRIAGE NOTES
Pt reports a fever, sore throat and body aches that started yesterday. 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

## 2020-02-18 NOTE — DISCHARGE INSTRUCTIONS
Please start the antibiotics tonight and take as prescribed.  Use ibuprofen or Tylenol for fever/pain.  The steroid given here this evening will help reduce inflammation over the next couple days.  Be sure you are drinking lots of fluids.  If you develop any worsening symptoms as discussed please return to the emergency department.    Thank you for choosing [facility]'s Emergency Department. It was a pleasure taking care of you today. If you have any questions, please call [phone].    Sylvia Moura PA-C

## 2020-02-18 NOTE — ED PROVIDER NOTES
"  History     Chief Complaint   Patient presents with     Fever     The history is provided by the patient.     Valarie Talavrea is a 29 year old female who is presenting to the emergency department with complaints of a fever. The patient reports her fever starting last night, but worsening this morning. She recorded a temperature of 104.6 using an ear thermometer at home around 1830. She has a sore throat, \"feels mucousy\", dizziness, vomiting, nausea, and body aches. She has not had a cough abdominal pain, diarrhea, or a large amount of congestion. She notes having a decreased appetite and says it has been hard to keep up her fluids. She mentions feeling like her throat is \"closing in on both sides\" but has not had trouble breathing. The patient works at a  and was exposed to her kids who were diagnosed with influenza B. She took Ibuprofen this morning but has not taken anything since.       Allergies:  Allergies   Allergen Reactions     Zithromax [Azithromycin] Other (See Comments)     hives       Problem List:    Patient Active Problem List    Diagnosis Date Noted     Tenosynovitis, de Quervain (right hand) 06/12/2019     Priority: Medium     Obesity (BMI 35.0-39.9) with comorbidity (H) 03/15/2019     Priority: Medium     Hyperlipidemia LDL goal <160 03/15/2019     Priority: Medium     Bilateral carpal tunnel syndrome 06/15/2018     Priority: Medium     Cubital tunnel syndrome of both upper extremities 06/15/2018     Priority: Medium     Term pregnancy 10/14/2017     Priority: Medium     Gastroesophageal reflux disease with esophagitis 10/04/2017     Priority: Medium     Back pain 08/23/2017     Priority: Medium     Hyperglycemia 07/13/2017     Priority: Medium     Nuchal cord, not applicable or unspecified fetus 06/23/2017     Priority: Medium     Encounter for supervision of other normal pregnancy in third trimester 03/11/2017     Priority: Medium     Vitamin D deficiency 02/02/2016     Priority: Medium "     HSIL on Pap smear of cervix 02/02/2016     Priority: Medium     2/2/16: Pap - HSIL. Plan colp  2/11/16 [place].  HSIL. Age 25  Plan: per tele enc on 2/18/16 [place] and cytology in August.  Will have pathologist differentiate between WESLY 2 and 3 if possible.  If lesion is worse, then LEEP.  If it is the same or better, then colp and cytology in 6 months.    8/30/16 HSIL pap/+ HR HPV (not 16 or 18). [place] not completed.  Per Dr. Luna, have pt. Schedule colp bef 11/30/16.  9/9/16 Pt. Advised.  9/22/16 [place]. NO SHOW.  10/6/16 [place] visit. NO SHOW.  12/8/16 Reminder letter.   1/11/17 [place] not done. Tracking updated for 6 mo colp/pap.   3/10/17 ASCUS pap/+ HR HPV (not 16 or 18). approx 9 weeks pregnant. Plan: colp around 20 weeks gestation, approx 5/26/17  10/14/17 delivered baby. (6 wk pp due 11/25/17)  3/25/19 [place] bx: normal, per provider. Plan: Cotest in 1 yr          Past Medical History:    Past Medical History:   Diagnosis Date     Anxiety      Depression with anxiety      Depressive disorder      HSIL on Pap smear of cervix 2/2/2016     Low blood pressure      Vitamin D deficiency        Past Surgical History:    Past Surgical History:   Procedure Laterality Date     NO HISTORY OF SURGERY         Family History:    Family History   Problem Relation Age of Onset     Hypertension No family hx of      Cerebrovascular Disease No family hx of      Hyperlipidemia No family hx of        Social History:  Marital Status:  Single [1]  Social History     Tobacco Use     Smoking status: Former Smoker     Types: Cigarettes     Smokeless tobacco: Never Used     Tobacco comment: very rare   Substance Use Topics     Alcohol use: No     Alcohol/week: 0.0 standard drinks     Frequency: Never     Drug use: No        Medications:    IBUPROFEN PO  penicillin V (VEETID) 500 MG tablet  cholecalciferol (VITAMIN D3) 5000 units (125 mcg) capsule  cyanocobalamin (VITAMIN B-12) 1000 MCG tablet  magnesium 250 MG  tablet  NONFORMULARY  omeprazole (PRILOSEC) 20 MG CR capsule  Prenatal Multivit-Min-Fe-FA (PRENATAL VITAMINS PO)          Review of Systems   All other systems reviewed and are negative.      Physical Exam   BP: (!) 161/78  Pulse: 107  Heart Rate: 114  Temp: 100.6  F (38.1  C)  Resp: 18  Weight: 113.4 kg (250 lb)  SpO2: 97 %      Physical Exam  Vitals signs and nursing note reviewed.   Constitutional:       General: She is not in acute distress.     Appearance: Normal appearance. She is obese. She is ill-appearing. She is not toxic-appearing or diaphoretic.   HENT:      Head: Normocephalic and atraumatic.      Right Ear: Tympanic membrane and external ear normal.      Left Ear: Tympanic membrane and external ear normal.      Nose: Nose normal.      Mouth/Throat:      Mouth: Mucous membranes are moist.      Pharynx: Uvula midline. Posterior oropharyngeal erythema present. No oropharyngeal exudate or uvula swelling.      Tonsils: Tonsillar exudate (with erythema, swelling) present. No tonsillar abscesses.   Eyes:      Extraocular Movements: Extraocular movements intact.      Conjunctiva/sclera: Conjunctivae normal.      Pupils: Pupils are equal, round, and reactive to light.   Neck:      Musculoskeletal: Normal range of motion and neck supple. No neck rigidity or muscular tenderness.   Cardiovascular:      Rate and Rhythm: Regular rhythm.      Pulses: Normal pulses.      Heart sounds: Normal heart sounds. No murmur.   Pulmonary:      Effort: Pulmonary effort is normal. No respiratory distress.      Breath sounds: Normal breath sounds. No wheezing.   Chest:      Chest wall: No tenderness.   Abdominal:      General: Bowel sounds are normal.      Palpations: Abdomen is soft.      Tenderness: There is no abdominal tenderness. There is no guarding or rebound.   Musculoskeletal:         General: No deformity.   Skin:     General: Skin is warm and dry.      Capillary Refill: Capillary refill takes less than 2 seconds.       Coloration: Skin is not pale.      Findings: No rash.   Neurological:      General: No focal deficit present.      Mental Status: She is alert and oriented to person, place, and time.   Psychiatric:         Mood and Affect: Mood normal.         Behavior: Behavior normal.         Thought Content: Thought content normal.         Judgment: Judgment normal.         ED Course        Procedures    Results for orders placed or performed during the hospital encounter of 02/17/20 (from the past 24 hour(s))   Streptococcus A Rapid Scr w Reflx to PCR   Result Value Ref Range    Strep Specimen Description Throat     Streptococcus Group A Rapid Screen Negative NEG^Negative   Influenza A/B antigen   Result Value Ref Range    Influenza A/B Agn Specimen Nasopharyngeal     Influenza A Negative NEG^Negative    Influenza B Negative NEG^Negative   CBC with platelets differential   Result Value Ref Range    WBC 9.7 4.0 - 11.0 10e9/L    RBC Count 4.57 3.8 - 5.2 10e12/L    Hemoglobin 13.7 11.7 - 15.7 g/dL    Hematocrit 40.1 35.0 - 47.0 %    MCV 88 78 - 100 fl    MCH 30.0 26.5 - 33.0 pg    MCHC 34.2 31.5 - 36.5 g/dL    RDW 12.9 10.0 - 15.0 %    Platelet Count 293 150 - 450 10e9/L    Diff Method Automated Method     % Neutrophils 74.8 %    % Lymphocytes 14.7 %    % Monocytes 10.0 %    % Eosinophils 0.2 %    % Basophils 0.1 %    % Immature Granulocytes 0.2 %    Nucleated RBCs 0 0 /100    Absolute Neutrophil 7.2 1.6 - 8.3 10e9/L    Absolute Lymphocytes 1.4 0.8 - 5.3 10e9/L    Absolute Monocytes 1.0 0.0 - 1.3 10e9/L    Absolute Basophils 0.0 0.0 - 0.2 10e9/L    Abs Immature Granulocytes 0.0 0 - 0.4 10e9/L    Absolute Nucleated RBC 0.0    Basic metabolic panel   Result Value Ref Range    Sodium 138 133 - 144 mmol/L    Potassium 3.4 3.4 - 5.3 mmol/L    Chloride 108 94 - 109 mmol/L    Carbon Dioxide 24 20 - 32 mmol/L    Anion Gap 6 3 - 14 mmol/L    Glucose 117 (H) 70 - 99 mg/dL    Urea Nitrogen 10 7 - 30 mg/dL    Creatinine 0.54 0.52 - 1.04 mg/dL     GFR Estimate >90 >60 mL/min/[1.73_m2]    GFR Estimate If Black >90 >60 mL/min/[1.73_m2]    Calcium 8.9 8.5 - 10.1 mg/dL   CRP inflammation   Result Value Ref Range    CRP Inflammation 63.9 (H) 0.0 - 8.0 mg/L   Mononucleosis screen   Result Value Ref Range    Mononucleosis Screen Negative NEG^Negative       Medications   acetaminophen (TYLENOL) tablet 975 mg (975 mg Oral Given 2/17/20 1942)   ibuprofen (ADVIL/MOTRIN) tablet 600 mg (600 mg Oral Given 2/17/20 1942)   dexamethasone (DECADRON) tablet 10 mg (10 mg Oral Given 2/17/20 2048)       Assessments & Plan (with Medical Decision Making)  Valarie Talavera is a  29 year old female who presents to the ED with a 2-day history of sore throat and fevers.  On arrival she had a temp of 100.4  F, was mildly tachycardic and hypertensive but otherwise unremarkable vital signs.  Exam revealed erythematous pharynx with tonsillar exudates and swelling.  Uvula was midline, there was no obvious signs of peritonsillar or retropharyngeal abscess.  Patient was given ibuprofen and Tylenol here for symptoms with actually good improvement.  Strep screen was negative today.  Because of this, decision made to evaluate things further with blood work to rule out mono.  Influenza screen also obtained.  Mono and influenza were both negative.  Her white count was within normal limits however CRP elevated at 63.9.  I discussed case with Dr. Alexander.  I think given exam findings and elevated CRP it would be reasonable to cover her for strep B pharyngitis.  Patient was comfortable with plan to start penicillin this evening and take for 10 days.  She was given a dose of Decadron here for improvement in swelling over the next few days as well.  Encouraged to continue with ibuprofen or Tylenol for pain/fever, plenty of fluids, rest.  I went over warning signs and symptoms of when she needs to return to the ED.  All questions were answered and the patient was discharged home in suitable condition.     I  have reviewed the nursing notes.    I have reviewed the findings, diagnosis, plan and need for follow up with the patient.    New Prescriptions    PENICILLIN V (VEETID) 500 MG TABLET    Take 1 tablet (500 mg) by mouth 4 times daily for 10 days       Final diagnoses:   Acute pharyngitis, unspecified etiology       This document serves as a record of services personally performed by Sylvia Moura PA-C. It was created on their behalf by Marjorie Moura, a trained medical scribe. The creation of this record is based on the provider's personal observations and the statements of the patient. This document has been checked and approved by the attending provider.  Note: Chart documentation done in part with Dragon Voice Recognition software. Although reviewed after completion, some word and grammatical errors may remain.  2/17/2020   [facility] EMERGENCY DEPARTMENT     Sylvia Moura PA-C  02/17/20 2052

## 2020-02-20 ENCOUNTER — APPOINTMENT (OUTPATIENT)
Dept: CT IMAGING | Facility: CLINIC | Age: 30
End: 2020-02-20
Attending: EMERGENCY MEDICINE
Payer: MEDICAID

## 2020-02-20 ENCOUNTER — HOSPITAL ENCOUNTER (EMERGENCY)
Facility: CLINIC | Age: 30
Discharge: ANOTHER HOSPITAL | End: 2020-02-21
Attending: EMERGENCY MEDICINE | Admitting: EMERGENCY MEDICINE
Payer: MEDICAID

## 2020-02-20 DIAGNOSIS — J36 PERITONSILLAR ABSCESS: ICD-10-CM

## 2020-02-20 LAB
ANION GAP SERPL CALCULATED.3IONS-SCNC: 4 MMOL/L (ref 3–14)
BASOPHILS # BLD AUTO: 0 10E9/L (ref 0–0.2)
BASOPHILS NFR BLD AUTO: 0.1 %
BUN SERPL-MCNC: 15 MG/DL (ref 7–30)
CALCIUM SERPL-MCNC: 8.8 MG/DL (ref 8.5–10.1)
CHLORIDE SERPL-SCNC: 106 MMOL/L (ref 94–109)
CO2 SERPL-SCNC: 29 MMOL/L (ref 20–32)
CREAT SERPL-MCNC: 0.51 MG/DL (ref 0.52–1.04)
CRP SERPL-MCNC: 83.1 MG/L (ref 0–8)
DIFFERENTIAL METHOD BLD: NORMAL
EOSINOPHIL NFR BLD AUTO: 1.2 %
ERYTHROCYTE [DISTWIDTH] IN BLOOD BY AUTOMATED COUNT: 12.8 % (ref 10–15)
GFR SERPL CREATININE-BSD FRML MDRD: >90 ML/MIN/{1.73_M2}
GLUCOSE SERPL-MCNC: 94 MG/DL (ref 70–99)
HCT VFR BLD AUTO: 38.2 % (ref 35–47)
HGB BLD-MCNC: 12.9 G/DL (ref 11.7–15.7)
IMM GRANULOCYTES # BLD: 0 10E9/L (ref 0–0.4)
IMM GRANULOCYTES NFR BLD: 0.3 %
LYMPHOCYTES # BLD AUTO: 2.2 10E9/L (ref 0.8–5.3)
LYMPHOCYTES NFR BLD AUTO: 22.4 %
MCH RBC QN AUTO: 30.1 PG (ref 26.5–33)
MCHC RBC AUTO-ENTMCNC: 33.8 G/DL (ref 31.5–36.5)
MCV RBC AUTO: 89 FL (ref 78–100)
MONOCYTES # BLD AUTO: 1 10E9/L (ref 0–1.3)
MONOCYTES NFR BLD AUTO: 10.5 %
NEUTROPHILS # BLD AUTO: 6.5 10E9/L (ref 1.6–8.3)
NEUTROPHILS NFR BLD AUTO: 65.5 %
NRBC # BLD AUTO: 0 10*3/UL
NRBC BLD AUTO-RTO: 0 /100
PLATELET # BLD AUTO: 277 10E9/L (ref 150–450)
POTASSIUM SERPL-SCNC: 3.4 MMOL/L (ref 3.4–5.3)
RBC # BLD AUTO: 4.28 10E12/L (ref 3.8–5.2)
SODIUM SERPL-SCNC: 139 MMOL/L (ref 133–144)
WBC # BLD AUTO: 9.8 10E9/L (ref 4–11)

## 2020-02-20 PROCEDURE — 87040 BLOOD CULTURE FOR BACTERIA: CPT | Performed by: EMERGENCY MEDICINE

## 2020-02-20 PROCEDURE — 99285 EMERGENCY DEPT VISIT HI MDM: CPT | Mod: 25 | Performed by: EMERGENCY MEDICINE

## 2020-02-20 PROCEDURE — 96365 THER/PROPH/DIAG IV INF INIT: CPT | Mod: 59 | Performed by: EMERGENCY MEDICINE

## 2020-02-20 PROCEDURE — 96361 HYDRATE IV INFUSION ADD-ON: CPT | Performed by: EMERGENCY MEDICINE

## 2020-02-20 PROCEDURE — 85025 COMPLETE CBC W/AUTO DIFF WBC: CPT | Performed by: EMERGENCY MEDICINE

## 2020-02-20 PROCEDURE — 99284 EMERGENCY DEPT VISIT MOD MDM: CPT | Mod: Z6 | Performed by: EMERGENCY MEDICINE

## 2020-02-20 PROCEDURE — 70491 CT SOFT TISSUE NECK W/DYE: CPT

## 2020-02-20 PROCEDURE — 25800030 ZZH RX IP 258 OP 636: Performed by: EMERGENCY MEDICINE

## 2020-02-20 PROCEDURE — 25000128 H RX IP 250 OP 636: Performed by: EMERGENCY MEDICINE

## 2020-02-20 PROCEDURE — 96375 TX/PRO/DX INJ NEW DRUG ADDON: CPT | Mod: 59 | Performed by: EMERGENCY MEDICINE

## 2020-02-20 PROCEDURE — 36415 COLL VENOUS BLD VENIPUNCTURE: CPT | Performed by: EMERGENCY MEDICINE

## 2020-02-20 PROCEDURE — 80048 BASIC METABOLIC PNL TOTAL CA: CPT | Performed by: EMERGENCY MEDICINE

## 2020-02-20 PROCEDURE — 25000125 ZZHC RX 250: Performed by: EMERGENCY MEDICINE

## 2020-02-20 PROCEDURE — 86140 C-REACTIVE PROTEIN: CPT | Performed by: EMERGENCY MEDICINE

## 2020-02-20 RX ORDER — KETOROLAC TROMETHAMINE 30 MG/ML
30 INJECTION, SOLUTION INTRAMUSCULAR; INTRAVENOUS ONCE
Status: COMPLETED | OUTPATIENT
Start: 2020-02-20 | End: 2020-02-20

## 2020-02-20 RX ORDER — IOPAMIDOL 755 MG/ML
100 INJECTION, SOLUTION INTRAVASCULAR ONCE
Status: COMPLETED | OUTPATIENT
Start: 2020-02-20 | End: 2020-02-20

## 2020-02-20 RX ORDER — DEXAMETHASONE SODIUM PHOSPHATE 10 MG/ML
10 INJECTION, SOLUTION INTRAMUSCULAR; INTRAVENOUS ONCE
Status: COMPLETED | OUTPATIENT
Start: 2020-02-20 | End: 2020-02-20

## 2020-02-20 RX ORDER — CLINDAMYCIN PHOSPHATE 600 MG/50ML
600 INJECTION, SOLUTION INTRAVENOUS ONCE
Status: COMPLETED | OUTPATIENT
Start: 2020-02-20 | End: 2020-02-20

## 2020-02-20 RX ORDER — SODIUM CHLORIDE 9 MG/ML
1000 INJECTION, SOLUTION INTRAVENOUS CONTINUOUS
Status: DISCONTINUED | OUTPATIENT
Start: 2020-02-20 | End: 2020-02-21 | Stop reason: HOSPADM

## 2020-02-20 RX ADMIN — KETOROLAC TROMETHAMINE 30 MG: 30 INJECTION, SOLUTION INTRAMUSCULAR at 20:36

## 2020-02-20 RX ADMIN — IOPAMIDOL 80 ML: 755 INJECTION, SOLUTION INTRAVENOUS at 20:52

## 2020-02-20 RX ADMIN — SODIUM CHLORIDE 1000 ML: 9 INJECTION, SOLUTION INTRAVENOUS at 22:10

## 2020-02-20 RX ADMIN — DEXAMETHASONE SODIUM PHOSPHATE 10 MG: 10 INJECTION, SOLUTION INTRAMUSCULAR; INTRAVENOUS at 20:37

## 2020-02-20 RX ADMIN — CLINDAMYCIN PHOSPHATE 600 MG: 600 INJECTION, SOLUTION INTRAVENOUS at 22:33

## 2020-02-20 RX ADMIN — SODIUM CHLORIDE 80 ML: 9 INJECTION, SOLUTION INTRAVENOUS at 20:52

## 2020-02-20 RX ADMIN — SODIUM CHLORIDE 1000 ML: 9 INJECTION, SOLUTION INTRAVENOUS at 20:35

## 2020-02-20 ASSESSMENT — ENCOUNTER SYMPTOMS
COUGH: 0
HEADACHES: 1
STRIDOR: 0
TROUBLE SWALLOWING: 1
FATIGUE: 1
FEVER: 1
VOMITING: 0
CHILLS: 1
SORE THROAT: 1
SHORTNESS OF BREATH: 0

## 2020-02-21 ENCOUNTER — TRANSFERRED RECORDS (OUTPATIENT)
Dept: HEALTH INFORMATION MANAGEMENT | Facility: CLINIC | Age: 30
End: 2020-02-21

## 2020-02-21 ENCOUNTER — PATIENT OUTREACH (OUTPATIENT)
Dept: CARE COORDINATION | Facility: CLINIC | Age: 30
End: 2020-02-21

## 2020-02-21 VITALS
SYSTOLIC BLOOD PRESSURE: 126 MMHG | RESPIRATION RATE: 18 BRPM | TEMPERATURE: 100.1 F | WEIGHT: 254 LBS | DIASTOLIC BLOOD PRESSURE: 73 MMHG | OXYGEN SATURATION: 98 % | HEART RATE: 82 BPM | BODY MASS INDEX: 40.38 KG/M2

## 2020-02-21 NOTE — ED PROVIDER NOTES
"  History     Chief Complaint   Patient presents with     Flu Symptoms     HPI  Valarie Talavera is a 29 year old female who presents with sore throat, fever, body aches, and chills.  She was seen in the ED 3 days ago for the same symptoms and states that she \"is not any better\".  She was evaluated in the ED for her complaints, and was started on penicillin.  She states that she has been taking it but it is not changing her symptoms at all.  She was also given steroid in the ED which \"did not do a thing\".  She is still having sore throat and difficulty swallowing, but is tolerating her secretions.  Is around sick contacts since she works at a  and had multiple children diagnosed with influenza B.  He also states that her fever has persisted despite taking Tylenol and ibuprofen at home.    Allergies:  Allergies   Allergen Reactions     Zithromax [Azithromycin] Other (See Comments)     hives       Problem List:    Patient Active Problem List    Diagnosis Date Noted     Tenosynovitis, de Quervain (right hand) 06/12/2019     Priority: Medium     Obesity (BMI 35.0-39.9) with comorbidity (H) 03/15/2019     Priority: Medium     Hyperlipidemia LDL goal <160 03/15/2019     Priority: Medium     Bilateral carpal tunnel syndrome 06/15/2018     Priority: Medium     Cubital tunnel syndrome of both upper extremities 06/15/2018     Priority: Medium     Term pregnancy 10/14/2017     Priority: Medium     Gastroesophageal reflux disease with esophagitis 10/04/2017     Priority: Medium     Back pain 08/23/2017     Priority: Medium     Hyperglycemia 07/13/2017     Priority: Medium     Nuchal cord, not applicable or unspecified fetus 06/23/2017     Priority: Medium     Encounter for supervision of other normal pregnancy in third trimester 03/11/2017     Priority: Medium     Vitamin D deficiency 02/02/2016     Priority: Medium     HSIL on Pap smear of cervix 02/02/2016     Priority: Medium     2/2/16: Pap - HSIL. Plan colp  2/11/16 " [facility].  HSIL. Age 25  Plan: per tele enc on 2/18/16 [facility] and cytology in August.  Will have pathologist differentiate between WESLY 2 and 3 if possible.  If lesion is worse, then LEEP.  If it is the same or better, then colp and cytology in 6 months.    8/30/16 HSIL pap/+ HR HPV (not 16 or 18). [facility] not completed.  Per andie Spence pt. Schedule colp bef 11/30/16.  9/9/16 Pt. Advised.  9/22/16 [facility]. NO SHOW.  10/6/16 [facility] visit. NO SHOW.  12/8/16 Reminder letter.   1/11/17 [facility] not done. Tracking updated for 6 mo colp/pap.   3/10/17 ASCUS pap/+ HR HPV (not 16 or 18). approx 9 weeks pregnant. Plan: colp around 20 weeks gestation, approx 5/26/17  10/14/17 delivered baby. (6 wk pp due 11/25/17)  3/25/19 [facility] bx: normal, per provider. Plan: [facility] in 1 yr          Past Medical History:    Past Medical History:   Diagnosis Date     Anxiety      Depression with anxiety      Depressive disorder      HSIL on Pap smear of cervix 2/2/2016     Low blood pressure      Vitamin D deficiency        Past Surgical History:    Past Surgical History:   Procedure Laterality Date     NO HISTORY OF SURGERY         Family History:    Family History   Problem Relation Age of Onset     Hypertension No family hx of      Cerebrovascular Disease No family hx of      Hyperlipidemia No family hx of        Social History:  Marital Status:  Single [1]  Social History     Tobacco Use     Smoking status: Former Smoker     Types: Cigarettes     Smokeless tobacco: Never Used     Tobacco comment: very rare   Substance Use Topics     Alcohol use: No     Alcohol/week: 0.0 standard drinks     Frequency: Never     Drug use: No        Medications:    cholecalciferol (VITAMIN D3) 5000 units (125 mcg) capsule  cyanocobalamin (VITAMIN B-12) 1000 MCG tablet  IBUPROFEN PO  magnesium 250 MG tablet  NONFORMULARY  omeprazole (PRILOSEC) 20 MG CR capsule  penicillin V (VEETID) 500 MG tablet  Prenatal Multivit-Min-Fe-FA (PRENATAL VITAMINS PO)          Review of  Systems   Constitutional: Positive for chills, fatigue and fever.   HENT: Positive for sore throat and trouble swallowing. Negative for drooling.    Respiratory: Negative for cough, shortness of breath and stridor.    Gastrointestinal: Negative for vomiting.   Neurological: Positive for headaches.   All other systems reviewed and are negative.      Physical Exam   BP: (!) 143/64  Pulse: 105  Temp: 100.1  F (37.8  C)  Resp: 20  Weight: 115.2 kg (254 lb)  SpO2: 98 %      Physical Exam  Vitals signs and nursing note reviewed.   Constitutional:       Appearance: She is obese. She is ill-appearing.   HENT:      Head: Normocephalic and atraumatic.      Right Ear: Tympanic membrane normal.      Left Ear: Tympanic membrane normal.      Nose: Nose normal.      Mouth/Throat:      Mouth: Mucous membranes are moist.      Pharynx: Oropharyngeal exudate and posterior oropharyngeal erythema present.   Neck:      Musculoskeletal: Normal range of motion and neck supple.   Cardiovascular:      Rate and Rhythm: Regular rhythm. Tachycardia present.      Pulses: Normal pulses.   Pulmonary:      Effort: Pulmonary effort is normal.      Breath sounds: Normal breath sounds.   Skin:     General: Skin is warm and dry.   Neurological:      Mental Status: She is alert.         ED Course        Procedures               Critical Care time:  none               Results for orders placed or performed during the hospital encounter of 02/20/20 (from the past 24 hour(s))   CBC with platelets differential   Result Value Ref Range    WBC 9.8 4.0 - 11.0 10e9/L    RBC Count 4.28 3.8 - 5.2 10e12/L    Hemoglobin 12.9 11.7 - 15.7 g/dL    Hematocrit 38.2 35.0 - 47.0 %    MCV 89 78 - 100 fl    MCH 30.1 26.5 - 33.0 pg    MCHC 33.8 31.5 - 36.5 g/dL    RDW 12.8 10.0 - 15.0 %    Platelet Count 277 150 - 450 10e9/L    Diff Method Automated Method     % Neutrophils 65.5 %    % Lymphocytes 22.4 %    % Monocytes 10.5 %    % Eosinophils 1.2 %    % Basophils 0.1 %    %  Immature Granulocytes 0.3 %    Nucleated RBCs 0 0 /100    Absolute Neutrophil 6.5 1.6 - 8.3 10e9/L    Absolute Lymphocytes 2.2 0.8 - 5.3 10e9/L    Absolute Monocytes 1.0 0.0 - 1.3 10e9/L    Absolute Basophils 0.0 0.0 - 0.2 10e9/L    Abs Immature Granulocytes 0.0 0 - 0.4 10e9/L    Absolute Nucleated RBC 0.0    Basic metabolic panel   Result Value Ref Range    Sodium 139 133 - 144 mmol/L    Potassium 3.4 3.4 - 5.3 mmol/L    Chloride 106 94 - 109 mmol/L    Carbon Dioxide 29 20 - 32 mmol/L    Anion Gap 4 3 - 14 mmol/L    Glucose 94 70 - 99 mg/dL    Urea Nitrogen 15 7 - 30 mg/dL    Creatinine 0.51 (L) 0.52 - 1.04 mg/dL    GFR Estimate >90 >60 mL/min/[1.73_m2]    GFR Estimate If Black >90 >60 mL/min/[1.73_m2]    Calcium 8.8 8.5 - 10.1 mg/dL   CRP inflammation   Result Value Ref Range    CRP Inflammation 83.1 (H) 0.0 - 8.0 mg/L   Soft tissue neck CT w contrast    Narrative    CT OF THE NECK WITH CONTRAST  2/20/2020 9:03 PM     COMPARISON: None.    HISTORY: Sore throat/stridor, epiglottis or tonsillitis suspected.    TECHNIQUE:  Axial CT images of the neck were acquired after the  intravenous administration of 80mL Isovue 370 nonionic iodinated  contrast material. Coronal reconstructions were created.    FINDINGS: There is moderate bilateral palatine tonsillar enlargement  with striated and mucosal enhancement and linear and rounded areas of  central hypoenhancement which may reflect edematous tonsillar crypts  and/or small intertonsillar abscesses. 0.9 x 0.8 x 1.0 cm area of  relative hypoenhancement with thin peripheral rim of enhancement at  the anterior and lateral aspect of the right palatine tonsil (series 2  image 31) and ill-defined region of hypoenhancement within the left  palatine tonsil measuring 1.4 x 0.8 cm (series 2 image 31), concerning  for peritonsillar phlegmonous change with early abscess formation.  There is moderate oropharyngeal airway compromise. The piriform  sinuses and laryngeal structures are  normal. The major salivary glands  are unremarkable. The thyroid gland is normal. The cervical vascular  structures are patent. Limited evaluation of the intracranial  compartment is normal.    Multiple reactive cervical lymph nodes. There is an enlarged right  paratracheal lymph node measuring 2.2 x 3.9 cm      Impression    IMPRESSION:  1. Tonsillar enlargement with hyperenhancement and reactive cervical  lymphadenopathy is suggestive of acute infectious tonsillitis.  2. Bilateral areas of low-attenuation within the palatine tonsils  compatible with intertonsillar phlegmonous change and concerning for  early/developing abscess.  3. Enlarged right peritracheal lymph node/lymph node cluster.    Radiation dose for this scan was reduced using automated exposure  control, adjustment of the mA and/or kV according to patient size, or  iterative reconstruction technique.        Medications   0.9% sodium chloride BOLUS (1,000 mLs Intravenous New Bag 2/20/20 2035)     Followed by   sodium chloride 0.9% infusion (has no administration in time range)   clindamycin (CLEOCIN) infusion 600 mg (has no administration in time range)   ketorolac (TORADOL) injection 30 mg (30 mg Intravenous Given 2/20/20 2036)   dexamethasone PF (DECADRON) injection 10 mg (10 mg Intravenous Given 2/20/20 2037)   iopamidol (ISOVUE-370) solution 100 mL (80 mLs Intravenous Given 2/20/20 2052)   Sodium Chloride 0.9 % bag 100mL for CT scan (80 mLs Intravenous Given 2/20/20 2052)   sodium chloride (PF) 0.9% PF flush 3 mL (10 mLs Intracatheter Given 2/20/20 2051)       Assessments & Plan (with Medical Decision Making)  Valarie is a 29-year-old female who presents to the ER for multiple complaints for which she was recently evaluated.  She is complaining of persistent sore throat, fever, chills, body aches for the last several days.  She was evaluated on 2/17/2020 in this ER, and work-up revealed negative rapid strep, negative influenza swab, negative for mono.   Reviewed reflex culture and it remains negative for strep a.  She was treated for strep B at time of discharge, and instructed to take penicillin.  She has been taking this but is not any better.  She was also given some Decadron in the ER at that time, and it was notable that she improved after Decadron, Tylenol, ibuprofen in the ER at that visit, but the patient maintains that this did not help her at all.  History and physical exam as above    Obese and mildly ill-appearing female.  She does have erythema and mild exudate in posterior pharynx, enlarged tonsils.  She is febrile and mildly tachycardic.  Will repeat lab work and order for IV fluids, Toradol, and Decadron to help with her symptoms.  She does not have any uvula swelling or indication for tonsillar abscess or retropharyngeal abscess, but will do scan of her neck to further evaluate due to her persistent symptoms despite treatment.  Lab results as above.  CRP continues to trend upwards, is now 83.1 up from 66 3 days ago.  White count remains within normal limits, no left shift.  CT scan of the neck shows developing peritonsillar abscess with moderate oropharyngeal compromise.  Patient was informed of these results as above.  She states that she feels a little bit better after receiving IV fluids and medication, but still feels like she has difficulty swallowing.  She is still tolerating her secretions, no stridor, breathing comfortably without any respiratory distress.  Informed her that we will need to give IV antibiotics and will likely need to keep her hospitalized.  Since our ENT specialist will not be able inpatient at this facility tomorrow, should the need arise for drainage or other intervention, patient would be best served to be at a facility that does have ENT available more immediately.  She understands, and request to be transferred to [facility] since this is close to her house.  Called and spoke with hospitalist at [facility], Dr. Tejeda  (hostpialist), who accepted the patient in transfer.  Is aware that blood cultures are going to be drawn prior to given IV clindamycin.  Patient will be transferred via ambulance due to CT findings of moderate oropharyngeal compromise as well as IV fluids and antibiotic administration.     I have reviewed the nursing notes.    I have reviewed the findings, diagnosis, plan and need for follow up with the patient.       ED to Inpatient Handoff:    Discussed with Dr. Tejeda at 2120  Patient accepted for Inpatient Stay  Pending studies include blood cultures  Code Status: Not Addressed           New Prescriptions    No medications on file       Final diagnoses:   Peritonsillar abscess       2/20/2020   [facility] EMERGENCY DEPARTMENT     Orin Welch,   02/20/20 1028

## 2020-02-21 NOTE — PROGRESS NOTES
Clinic Care Coordination Contact    Situation: Patient chart reviewed by care coordinator.    Background: patient flagged for care coordination outreach. Identified on ED discharge report.    Assessment: Patient was seen at [facility] Emergency Department 2/17, and 2/20. Patient diagnosed with pharyngitis, peritonsillar abscess.     Plan/Recommendations: patient was transferred to another facility. The patient has not yet authorized the release of records via care everywhere. CC RN unable to see further records. No outreach will be made at this time.     YVONNE Mendoza RN Clinic Care Coordinator   [place], [place], [place]  Phone: [phone]

## 2020-02-27 LAB
BACTERIA SPEC CULT: NO GROWTH
BACTERIA SPEC CULT: NO GROWTH
SPECIMEN SOURCE: NORMAL
SPECIMEN SOURCE: NORMAL

## 2020-03-16 ENCOUNTER — PATIENT OUTREACH (OUTPATIENT)
Dept: PEDIATRICS | Facility: CLINIC | Age: 30
End: 2020-03-16

## 2020-03-16 DIAGNOSIS — R87.613 HSIL ON PAP SMEAR OF CERVIX: ICD-10-CM

## 2020-09-17 ENCOUNTER — OFFICE VISIT (OUTPATIENT)
Dept: FAMILY MEDICINE | Facility: CLINIC | Age: 30
End: 2020-09-17
Payer: COMMERCIAL

## 2020-09-17 VITALS
HEART RATE: 106 BPM | WEIGHT: 240 LBS | TEMPERATURE: 98.3 F | BODY MASS INDEX: 37.67 KG/M2 | HEIGHT: 67 IN | DIASTOLIC BLOOD PRESSURE: 60 MMHG | SYSTOLIC BLOOD PRESSURE: 110 MMHG | OXYGEN SATURATION: 98 %

## 2020-09-17 DIAGNOSIS — M72.2 PLANTAR FASCIITIS: Primary | ICD-10-CM

## 2020-09-17 PROCEDURE — 99213 OFFICE O/P EST LOW 20 MIN: CPT | Performed by: FAMILY MEDICINE

## 2020-09-17 ASSESSMENT — MIFFLIN-ST. JEOR: SCORE: 1853.26

## 2020-09-17 NOTE — PROGRESS NOTES
Subjective     Valarie Talavera is a 29 year old female who presents to clinic today for the following health issues:  Chief Complaint   Patient presents with     Musculoskeletal Problem     BOTH FEET AND HEELS HURT  for a few years last few months have been worse     Declined Flu shot today.  HPI       Musculoskeletal problem/pain  Onset/Duration:  A few years  Description  Location: leg - bilateral  Joint Swelling: YES- sometimes in the arch area  Redness: YES  Pain: YES  Warmth: YES, off and on  Intensity:  severe  Progression of Symptoms:  worsening  Accompanying signs and symptoms:   Fevers: no  Numbness/tingling/weakness: YES  History  Trauma to the area: no  Recent illness:  no  Previous similar problem: no  Previous evaluation:  no  Precipitating or alleviating factors:  Aggravating factors include: standing, walking, climbing stairs, exercise and overuse  Therapies tried and outcome: massage, acupuncture, and Soaking none seem to help    SUBJECTIVE:  Valarie  is a 29 year old female who presents for: Symptoms as noted above.  She has tried almost everything including massage and acupuncture.  Shoe inserts.  She is on her feet all day long usually on hard surface.    Past Medical History:   Diagnosis Date     Anxiety      Depression with anxiety      Depressive disorder      HSIL on Pap smear of cervix 2/2/2016    colp scheduled 2/11/16     Low blood pressure      Vitamin D deficiency      Past Surgical History:   Procedure Laterality Date     NO HISTORY OF SURGERY       Social History     Tobacco Use     Smoking status: Former Smoker     Types: Cigarettes     Smokeless tobacco: Never Used     Tobacco comment: very rare   Substance Use Topics     Alcohol use: No     Alcohol/week: 0.0 standard drinks     Frequency: Never     No current outpatient medications on file.       REVIEW OF SYSTEMS:   5 point ROS negative except as noted above in HPI, including Gen., Resp, CV, GI &  system review.  "    OBJECTIVE:  Vitals: /60   Pulse 106   Temp 98.3  F (36.8  C) (Temporal)   Ht 1.705 m (5' 7.13\")   Wt 108.9 kg (240 lb)   SpO2 98%   BMI 37.45 kg/m    BMI= Body mass index is 37.45 kg/m .  She is alert appears in no distress.  Both feet with very high arch.  Very tender medial side of the calcaneus at the insertion of the plantar fascia.  No redness or warmth.    ASSESSMENT:  Bilateral plantar fasciitis    PLAN:  She is done a lot of conservative things given it time so we will refer her onto podiatry at this point.    Weight management plan: Discussed healthy diet and exercise guidelines    Dao Li MD  [facility]            "

## 2020-09-23 ENCOUNTER — ANCILLARY PROCEDURE (OUTPATIENT)
Dept: GENERAL RADIOLOGY | Facility: CLINIC | Age: 30
End: 2020-09-23
Attending: PODIATRIST
Payer: COMMERCIAL

## 2020-09-23 ENCOUNTER — OFFICE VISIT (OUTPATIENT)
Dept: PODIATRY | Facility: CLINIC | Age: 30
End: 2020-09-23
Attending: FAMILY MEDICINE
Payer: COMMERCIAL

## 2020-09-23 VITALS
DIASTOLIC BLOOD PRESSURE: 70 MMHG | BODY MASS INDEX: 37.67 KG/M2 | SYSTOLIC BLOOD PRESSURE: 108 MMHG | WEIGHT: 240 LBS | HEIGHT: 67 IN

## 2020-09-23 DIAGNOSIS — M72.2 PLANTAR FASCIITIS: ICD-10-CM

## 2020-09-23 DIAGNOSIS — M72.2 PLANTAR FASCIITIS, BILATERAL: Primary | ICD-10-CM

## 2020-09-23 PROCEDURE — 99203 OFFICE O/P NEW LOW 30 MIN: CPT | Performed by: PODIATRIST

## 2020-09-23 PROCEDURE — 73630 X-RAY EXAM OF FOOT: CPT | Mod: TC

## 2020-09-23 RX ORDER — DICLOFENAC SODIUM 75 MG/1
75 TABLET, DELAYED RELEASE ORAL 2 TIMES DAILY
Qty: 60 TABLET | Refills: 1 | Status: SHIPPED | OUTPATIENT
Start: 2020-09-23 | End: 2022-10-03

## 2020-09-23 ASSESSMENT — PAIN SCALES - GENERAL: PAINLEVEL: EXTREME PAIN (8)

## 2020-09-23 ASSESSMENT — MIFFLIN-ST. JEOR: SCORE: 1853.32

## 2020-09-23 NOTE — PATIENT INSTRUCTIONS
PLANTAR FASCIITIS  The  plantar fascia  is a thick fibrous layer of tissue that covers the bones on the bottom of your foot. It supports the foot bones in an arched position.  Plantar fasciitis  is a painful inflammation of the plantar fascia due to overuse. This can develop gradually or suddenly. It usually affects one foot at a time but can affect both feet. Heel pain can be sharp and feel like a knife sticking in the bottom of your foot. Pain may occur after exercising, long distance jogging, stair climbing, long periods of standing, or after getting up from a seated position.  Risk factors include arthritis, diabetes, obesity or recent weight gain, flat-foot, high arch, wearing high heels or loose shoes or shoes with poor arch support.  Sudden changes in activity or shoe gear may contribute to symptoms.  Foot pain from this condition is usually worse in the morning and improves with walking. By the end of the day there may be a dull aching. Treatment requires improved support of feet, short-term rest and controlling inflammation. It may take up to nine months before all symptoms go away with the measures described below.  A steroid injection into the foot, or surgery may be needed if this is becomes long standing or severe.  HOME CARE  1. If you are overweight, lose weight to promote healing.  2. Choose supportive shoes (stiff through the shank) with good arch support and shock absorbency. Replace athletic shoes when they become worn out. Don t walk or run barefoot.  3. Shoe inserts are an important part of treatment. You can buy off-the-shelf shoe inserts inexpensively such as [employer].  The best ones are custom molded to your foot with a prescription.  4. Night splints keep the plantar fascia gently stretched while you sleep and will eliminate morning pain. Wear it ALL NIGHT EVERY NIGHT, or any time you sit for a long time.  5. Reduce by 10% or more the activities that stress the feet: jogging, prolonged  standing or walking, high impact sports, etc.  6. Stretch your feet. Gently flex your ankle by leaning into a wall or counter or drop your heel from a step.  Stretch two minutes of every hour you are awake.  7. Icing or massage may help heel pain. Apply an ice pack or frozen water or Coke bottle to the heel for 10-20 minutes as a preventive or after an acute flare of symptoms. You may repeat this as needed.   Follow up with your Doctor in 3 weeks as instructed.      Reliable shoe stores: To maximize your experience and provide the best possible fit.  Be sure to show them your foot concerns and tell them Dr. Owen sent you.      Stores listed in bold have only athletic shoes, and stores that are not bold are mostly casual or variety of shoes    [employer]  [address] [place] [address]  [phone]     [employer] [facility]  [address] [place] [address]  [phone]     [employer]  [address] [place] [address]  [phone]    [employer]  [address] [facility] [address]  [phone]    [employer]  [address] [place], [place] [address] [phone]    [employer]  [address] [place] [address]  [phone]                         [employer] Locations:     [address] [place] [address]   [phone]     [address] [place] [address]   [phone]     [address] [place] [address]   [phone]     [address] [place] [address]   [phone]     [address] [place] [address]   [phone]     [address] [place] [address]   [phone]     [address]   [place] [address]   [phone]     [address]   [place], [place] [id number] [phone]             How to find reasonable shoes          The correct width    Correct Fitting    Correct Length      Foot Distortion    Posture Distortion                           Torsional Rigidity      Grasp behind the heel and underneath the foot and twist      Bad    Excessive torsion/twist in midfoot     Less torsion/twist in midfoot is better                   Heel Counter Rigidity      Grasp just above   midsole and squeeze      Bad    Soft heel counter      Good    Rigid Heel Counter      Flexion Rigidity      Grasp shoe and bend from forefoot to rearfoot

## 2020-09-23 NOTE — PROGRESS NOTES
HPI:  Valarie Talavera is a 29 year old female who is seen in consultation at the request of Dao Li MD    Pt presents for eval of:   (Onset, Location, L/R, Character, Treatments, Injury if yes)    XR Left and Right foot today 9/23/2020     Ongoing for a few years, past few months > in dorsal,plantar Left and Right arch/heel pain. Lateral Left and Right foot swelling > Right due to past ankle sprains. Presents today with flip flops.  Constant, throbbing, burning, pinching. Tightness, tingling, burning with first steps after lying or sitting, pain 8-10. Feels better with crok shoes  Massage, acupuncture, soaking, compression, ibuprofen, OTC inserts    Works as a  at  and Cook/ at [employer], [employer].    Weight management plan: Patient was referred to their PCP to discuss a diet and exercise plan.     ROS: 10 point ROS neg other than the symptoms noted above in the HPI.    Patient Active Problem List   Diagnosis     Vitamin D deficiency     HSIL on Pap smear of cervix     Encounter for supervision of other normal pregnancy in third trimester     Nuchal cord, not applicable or unspecified fetus     Hyperglycemia     Back pain     Gastroesophageal reflux disease with esophagitis     Term pregnancy     Bilateral carpal tunnel syndrome     Cubital tunnel syndrome of both upper extremities     Obesity (BMI 35.0-39.9) with comorbidity (H)     Hyperlipidemia LDL goal <160     Tenosynovitis, de Quervain (right hand)       PAST MEDICAL HISTORY:   Past Medical History:   Diagnosis Date     Anxiety      Depression with anxiety      Depressive disorder      HSIL on Pap smear of cervix 2/2/2016    colp scheduled 2/11/16     Low blood pressure      Vitamin D deficiency      PAST SURGICAL HISTORY:   Past Surgical History:   Procedure Laterality Date     NO HISTORY OF SURGERY       MEDICATIONS:   Current Outpatient Medications:      diclofenac (VOLTAREN) 75 MG EC tablet, Take 1 tablet (75 mg) by  mouth 2 times daily, Disp: 60 tablet, Rfl: 1  ALLERGIES:    Allergies   Allergen Reactions     Zithromax [Azithromycin] Other (See Comments)     hives     SOCIAL HISTORY:   Social History     Socioeconomic History     Marital status: Single     Spouse name: Not on file     Number of children: Not on file     Years of education: Not on file     Highest education level: Not on file   Occupational History     Employer: [employer]   Social Needs     Financial resource strain: Not on file     Food insecurity     Worry: Not on file     Inability: Not on file     Transportation needs     Medical: Not on file     Non-medical: Not on file   Tobacco Use     Smoking status: Former Smoker     Types: Cigarettes     Smokeless tobacco: Never Used     Tobacco comment: very rare   Substance and Sexual Activity     Alcohol use: No     Alcohol/week: 0.0 standard drinks     Frequency: Never     Drug use: No     Sexual activity: Yes     Partners: Male   Lifestyle     Physical activity     Days per week: Not on file     Minutes per session: Not on file     Stress: Not on file   Relationships     Social connections     Talks on phone: Not on file     Gets together: Not on file     Attends Islam service: Not on file     Active member of club or organization: Not on file     Attends meetings of clubs or organizations: Not on file     Relationship status: Not on file     Intimate partner violence     Fear of current or ex partner: Not on file     Emotionally abused: Not on file     Physically abused: Not on file     Forced sexual activity: Not on file   Other Topics Concern     Parent/sibling w/ CABG, MI or angioplasty before 65F 55M? No   Social History Narrative    3/2017  Lives in [place] with Chas and daughter, Irene.  Valarie's aunt is staying with them.  No smokers in the home.  No indoor cats or kittens.  No concerns about domestic violence.     FAMILY HISTORY:   Family History   Problem Relation Age of Onset     Hypertension  "No family hx of      Cerebrovascular Disease No family hx of      Hyperlipidemia No family hx of        EXAM:Vitals: /70 (BP Location: Left arm, Patient Position: Sitting, Cuff Size: Adult Large)   Ht 1.705 m (5' 7.13\")   Wt 108.9 kg (240 lb)   BMI 37.44 kg/m    BMI= Body mass index is 37.44 kg/m .    General appearance: Patient is alert and fully cooperative with history & exam.  No sign of distress is noted during the visit.     Psychiatric: Affect is pleasant & appropriate.  Patient appears motivated to improve health.     Respiratory: Breathing is regular & unlabored while sitting.     HEENT: Hearing is intact to spoken word.  Speech is clear.  No gross evidence of visual impairment that would impact ambulation.     Vascular: DP & PT 2/4 & regular bilaterally.  No significant edema, rubor or varicosities noted.  CFT and skin temperature is normal to both lower extremities.       Neurologic: Lower extremity sensation is intact to light touch.  No evidence of weakness in the lower extremities.  No evidence of neuropathy and negative tinel sign.     Dermatologic: Skin is intact to both lower extremities without significant lesions, rash or abrasion.  Normal texture turgor and tone. No paronychia or evidence of soft tissue infection is noted.    Musculoskeletal: Patient is ambulatory without assistive device or brace. Pain is noted with firm palpation along the medial band of the plantar fascia bilateral foot most notably at the origination upon the calcaneus not through the arch.  No pain with compression of the calcaneus medial to lateral or with palpation of the achilles, peroneal or posterior tibial tendons.  Slightly more than 0  of ankle joint dorsiflexion without crepitus or pain throughout the ankle, subtalar or midtarsal joints.  No pain or limitations throughout manual muscle strength testing plus 5/5 to all four quadrants bilateral.  No palpable edema noted.      Radiographs:  Osteophyte noted " about the plantar calcaneus consistent with plantar fasciitis.     ASSESSMENT:       ICD-10-CM    1. Plantar fasciitis, bilateral  M72.2 diclofenac (VOLTAREN) 75 MG EC tablet     ORTHOTICS REFERRAL       PLAN:  Reviewed patient's chart in [facility] and discussed etiology and treatment options.      Treatments:  9/23/2020  Obtained and interpreted radiographs.   Discontinue barefoot walking or unsupported walking in shoes without shank.  Dispensed written instructions to obtain appropriate shoe gear and/or OTC inserts.  Dispensed anterior night splint to use all night every night.  Prescription oral Voltaren for a short course. Discussed risks.  Prescription for custom molded orthotics 9/23/2020  Follow up in 4-5 weeks       Mario Owen DPM

## 2020-09-23 NOTE — NURSING NOTE
Dispensed 2 Dorsal (Anterior) Night Splint, Size S/M, with [facility] agreement signed by patient. Ella Lewis CMA, September 23, 2020

## 2020-09-23 NOTE — LETTER
9/23/2020         RE: Valarie Talavera  [address]  [facility] [address]        Dear Colleague,    Thank you for referring your patient, Valarie Talavera, to the [facility]. Please see a copy of my visit note below.    HPI:  Valarie Talavera is a 29 year old female who is seen in consultation at the request of Dao Li MD    Pt presents for eval of:   (Onset, Location, L/R, Character, Treatments, Injury if yes)    XR Left and Right foot today 9/23/2020     Ongoing for a few years, past few months > in dorsal,plantar Left and Right arch/heel pain. Lateral Left and Right foot swelling > Right due to past ankle sprains. Presents today with flip flops.  Constant, throbbing, burning, pinching. Tightness, tingling, burning with first steps after lying or sitting, pain 8-10. Feels better with crok shoes  Massage, acupuncture, soaking, compression, ibuprofen, OTC inserts    Works as a  at  and Cook/ at [employer], standing.    Weight management plan: Patient was referred to their PCP to discuss a diet and exercise plan.     ROS: 10 point ROS neg other than the symptoms noted above in the HPI.    Patient Active Problem List   Diagnosis     Vitamin D deficiency     HSIL on Pap smear of cervix     Encounter for supervision of other normal pregnancy in third trimester     Nuchal cord, not applicable or unspecified fetus     Hyperglycemia     Back pain     Gastroesophageal reflux disease with esophagitis     Term pregnancy     Bilateral carpal tunnel syndrome     Cubital tunnel syndrome of both upper extremities     Obesity (BMI 35.0-39.9) with comorbidity (H)     Hyperlipidemia LDL goal <160     Tenosynovitis, de Quervain (right hand)       PAST MEDICAL HISTORY:   Past Medical History:   Diagnosis Date     Anxiety      Depression with anxiety      Depressive disorder      HSIL on Pap smear of cervix 2/2/2016    colp scheduled 2/11/16     Low blood pressure      Vitamin D  deficiency      PAST SURGICAL HISTORY:   Past Surgical History:   Procedure Laterality Date     NO HISTORY OF SURGERY       MEDICATIONS:   Current Outpatient Medications:      diclofenac (VOLTAREN) 75 MG EC tablet, Take 1 tablet (75 mg) by mouth 2 times daily, Disp: 60 tablet, Rfl: 1  ALLERGIES:    Allergies   Allergen Reactions     Zithromax [Azithromycin] Other (See Comments)     hives     SOCIAL HISTORY:   Social History     Socioeconomic History     Marital status: Single     Spouse name: Not on file     Number of children: Not on file     Years of education: Not on file     Highest education level: Not on file   Occupational History     Employer: [employer]   Social Needs     Financial resource strain: Not on file     Food insecurity     Worry: Not on file     Inability: Not on file     Transportation needs     Medical: Not on file     Non-medical: Not on file   Tobacco Use     Smoking status: Former Smoker     Types: Cigarettes     Smokeless tobacco: Never Used     Tobacco comment: very rare   Substance and Sexual Activity     Alcohol use: No     Alcohol/week: 0.0 standard drinks     Frequency: Never     Drug use: No     Sexual activity: Yes     Partners: Male   Lifestyle     Physical activity     Days per week: Not on file     Minutes per session: Not on file     Stress: Not on file   Relationships     Social connections     Talks on phone: Not on file     Gets together: Not on file     Attends Rastafari service: Not on file     Active member of club or organization: Not on file     Attends meetings of clubs or organizations: Not on file     Relationship status: Not on file     Intimate partner violence     Fear of current or ex partner: Not on file     Emotionally abused: Not on file     Physically abused: Not on file     Forced sexual activity: Not on file   Other Topics Concern     Parent/sibling w/ CABG, MI or angioplasty before 65F 55M? No   Social History Narrative    3/2017  Lives in [place] with  "Chas and daughter, Irene.  Valarie's aunt is staying with them.  No smokers in the home.  No indoor cats or kittens.  No concerns about domestic violence.     FAMILY HISTORY:   Family History   Problem Relation Age of Onset     Hypertension No family hx of      Cerebrovascular Disease No family hx of      Hyperlipidemia No family hx of        EXAM:Vitals: /70 (BP Location: Left arm, Patient Position: Sitting, Cuff Size: Adult Large)   Ht 1.705 m (5' 7.13\")   Wt 108.9 kg (240 lb)   BMI 37.44 kg/m    BMI= Body mass index is 37.44 kg/m .    General appearance: Patient is alert and fully cooperative with history & exam.  No sign of distress is noted during the visit.     Psychiatric: Affect is pleasant & appropriate.  Patient appears motivated to improve health.     Respiratory: Breathing is regular & unlabored while sitting.     HEENT: Hearing is intact to spoken word.  Speech is clear.  No gross evidence of visual impairment that would impact ambulation.     Vascular: DP & PT 2/4 & regular bilaterally.  No significant edema, rubor or varicosities noted.  CFT and skin temperature is normal to both lower extremities.       Neurologic: Lower extremity sensation is intact to light touch.  No evidence of weakness in the lower extremities.  No evidence of neuropathy and negative tinel sign.     Dermatologic: Skin is intact to both lower extremities without significant lesions, rash or abrasion.  Normal texture turgor and tone. No paronychia or evidence of soft tissue infection is noted.    Musculoskeletal: Patient is ambulatory without assistive device or brace. Pain is noted with firm palpation along the medial band of the plantar fascia bilateral foot most notably at the origination upon the calcaneus not through the arch.  No pain with compression of the calcaneus medial to lateral or with palpation of the achilles, peroneal or posterior tibial tendons.  Slightly more than 0  of ankle joint dorsiflexion " without crepitus or pain throughout the ankle, subtalar or midtarsal joints.  No pain or limitations throughout manual muscle strength testing plus 5/5 to all four quadrants bilateral.  No palpable edema noted.      Radiographs:  Osteophyte noted about the plantar calcaneus consistent with plantar fasciitis.     ASSESSMENT:       ICD-10-CM    1. Plantar fasciitis, bilateral  M72.2 diclofenac (VOLTAREN) 75 MG EC tablet     ORTHOTICS REFERRAL       PLAN:  Reviewed patient's chart in [facility] and discussed etiology and treatment options.      Treatments:  9/23/2020  Obtained and interpreted radiographs.   Discontinue barefoot walking or unsupported walking in shoes without shank.  Dispensed written instructions to obtain appropriate shoe gear and/or OTC inserts.  Dispensed anterior night splint to use all night every night.  Prescription oral Voltaren for a short course. Discussed risks.  Prescription for custom molded orthotics 9/23/2020  Follow up in 4-5 weeks       Mario Owen DPM      Again, thank you for allowing me to participate in the care of your patient.        Sincerely,        Mario Owen DPM

## 2021-01-07 LAB — COVID-19 VIRUS BY PCR (EXTERNAL RESULT): DETECTED

## 2021-01-15 ENCOUNTER — HEALTH MAINTENANCE LETTER (OUTPATIENT)
Age: 31
End: 2021-01-15

## 2021-03-03 ENCOUNTER — PATIENT OUTREACH (OUTPATIENT)
Dept: FAMILY MEDICINE | Facility: OTHER | Age: 31
End: 2021-03-03

## 2021-03-03 DIAGNOSIS — R87.613 HSIL ON PAP SMEAR OF CERVIX: ICD-10-CM

## 2021-03-03 NOTE — LETTER
March 3, 2021      Valaire Talavera  [address] [place] [id number]        Dear ,    This letter is to remind you that you are due for your follow-up Pap smear and Human Papillomavirus (HPV) test.    Please call [phone] to schedule your appointment at your earliest convenience.    If you have completed the appointment outside of the [facility] system, please have the records forwarded to our office. We will update your chart for your provider to review before your next annual wellness visit.     Thank you for choosing [facility]!      Sincerely,    Your [facility] Care Team

## 2021-05-05 NOTE — TELEPHONE ENCOUNTER
FYI to provider - Patient is lost to pap tracking follow-up. Attempts to contact pt have been made per reminder process and there has been no reply and/or no appt scheduled.       2/2/16: Pap - HSIL. Plan colp  2/11/16 [place].  HSIL. Age 25  Plan: per tele enc on 2/18/16 [place] and cytology in August.  Will have pathologist differentiate between WESLY 2 and 3 if possible.  If lesion is worse, then LEEP.  If it is the same or better, then colp and cytology in 6 months.    8/30/16 HSIL pap/+ HR HPV (not 16 or 18). [place] not completed.  Per Dr. Luna, have pt. Schedule colp bef 11/30/16.  9/9/16 Pt. Advised.  9/22/16 [place]. NO SHOW.  10/6/16 [place] visit. NO SHOW.  1/11/17 [place] not done. Tracking updated for 6 mo colp/pap.   3/10/17 ASCUS pap/+ HR HPV (not 16 or 18). approx 9 weeks pregnant. Plan: colp around 20 weeks gestation, approx 5/26/17  10/14/17 delivered baby. (6 wk pp due 11/25/17)  3/25/19 [place] bx: normal, per provider. Plan: Cotest in 1 due 3/25/20    3/3/21 Reminder letter  4/5/21 Reminder call - spoke to pt.   5/5/21 Lost to follow-up for pap tracking

## 2021-07-19 ENCOUNTER — APPOINTMENT (OUTPATIENT)
Dept: GENERAL RADIOLOGY | Facility: CLINIC | Age: 31
End: 2021-07-19
Attending: NURSE PRACTITIONER
Payer: COMMERCIAL

## 2021-07-19 ENCOUNTER — HOSPITAL ENCOUNTER (EMERGENCY)
Facility: CLINIC | Age: 31
Discharge: HOME | End: 2021-07-19
Attending: NURSE PRACTITIONER | Admitting: NURSE PRACTITIONER
Payer: COMMERCIAL

## 2021-07-19 VITALS
RESPIRATION RATE: 18 BRPM | HEART RATE: 85 BPM | OXYGEN SATURATION: 98 % | SYSTOLIC BLOOD PRESSURE: 116 MMHG | DIASTOLIC BLOOD PRESSURE: 64 MMHG | TEMPERATURE: 99 F

## 2021-07-19 DIAGNOSIS — S91.332A PUNCTURE WOUND OF PLANTAR ASPECT OF LEFT FOOT, INITIAL ENCOUNTER: ICD-10-CM

## 2021-07-19 PROCEDURE — 73630 X-RAY EXAM OF FOOT: CPT | Mod: LT

## 2021-07-19 PROCEDURE — 99282 EMERGENCY DEPT VISIT SF MDM: CPT | Performed by: NURSE PRACTITIONER

## 2021-07-19 PROCEDURE — 99283 EMERGENCY DEPT VISIT LOW MDM: CPT | Performed by: NURSE PRACTITIONER

## 2021-07-19 NOTE — ED TRIAGE NOTES
Pt reports she jammed a toothpick into the bottom of her left foot last night, she reports she did get some of it out but this morning she is having more pain and thinks there may be some still in there

## 2021-07-19 NOTE — ED PROVIDER NOTES
History     Chief Complaint   Patient presents with     Wound Check     HPI  Valarie Talavera is a 30 year old female who presents for evaluation of possible foreign body in the plantar aspect of her left foot. Patient stepped on a tooth pick yesterday which punctured her bottom of foot along the near the distal 4th metatarsal. Patient removed what she describes as a 3-4cm piece of the toothpcik. Today pain increased and patient concerned for retained foreign body.  Tetanus is UTD (2017).      Allergies:  Allergies   Allergen Reactions     Zithromax [Azithromycin] Other (See Comments)     hives       Problem List:    Patient Active Problem List    Diagnosis Date Noted     Tenosynovitis, de Quervain (right hand) 06/12/2019     Priority: Medium     Obesity (BMI 35.0-39.9) with comorbidity (H) 03/15/2019     Priority: Medium     Hyperlipidemia LDL goal <160 03/15/2019     Priority: Medium     Bilateral carpal tunnel syndrome 06/15/2018     Priority: Medium     Cubital tunnel syndrome of both upper extremities 06/15/2018     Priority: Medium     Term pregnancy 10/14/2017     Priority: Medium     Gastroesophageal reflux disease with esophagitis 10/04/2017     Priority: Medium     Back pain 08/23/2017     Priority: Medium     Hyperglycemia 07/13/2017     Priority: Medium     Nuchal cord, not applicable or unspecified fetus 06/23/2017     Priority: Medium     Encounter for supervision of other normal pregnancy in third trimester 03/11/2017     Priority: Medium     Vitamin D deficiency 02/02/2016     Priority: Medium     HSIL on Pap smear of cervix 02/02/2016     Priority: Medium     2/2/16: Pap - HSIL. Plan colp  2/11/16 [place].  HSIL. Age 25  Plan: per tele enc on 2/18/16 [place] and cytology in August.  Will have pathologist differentiate between WESLY 2 and 3 if possible.  If lesion is worse, then LEEP.  If it is the same or better, then colp and cytology in 6 months.    8/30/16 HSIL pap/+ HR HPV (not 16 or 18). [place] not  completed.  Per Dr. Luna, have pt. Schedule colp bef 11/30/16.  9/9/16 Pt. Advised.  9/22/16 [place]. NO SHOW.  10/6/16 [place] visit. NO SHOW.  1/11/17 [place] not done. Tracking updated for 6 mo colp/pap.   3/10/17 ASCUS pap/+ HR HPV (not 16 or 18). approx 9 weeks pregnant. Plan: colp around 20 weeks gestation, approx 5/26/17  10/14/17 delivered baby. (6 wk pp due 11/25/17)  3/25/19 [place] bx: normal, per provider. Plan: Cotest in 1 due 3/25/20    3/3/21 Reminder letter  4/5/21 Reminder call - spoke to pt.   5/5/21 Lost to follow-up for pap tracking            Past Medical History:    Past Medical History:   Diagnosis Date     Anxiety      Depression with anxiety      Depressive disorder      HSIL on Pap smear of cervix 2/2/2016     Low blood pressure      Vitamin D deficiency        Past Surgical History:    Past Surgical History:   Procedure Laterality Date     NO HISTORY OF SURGERY         Family History:    Family History   Problem Relation Age of Onset     Hypertension No family hx of      Cerebrovascular Disease No family hx of      Hyperlipidemia No family hx of        Social History:  Marital Status:  Single [1]  Social History     Tobacco Use     Smoking status: Former Smoker     Types: Cigarettes     Smokeless tobacco: Never Used     Tobacco comment: very rare   Substance Use Topics     Alcohol use: No     Alcohol/week: 0.0 standard drinks     Drug use: No        Medications:    diclofenac (VOLTAREN) 75 MG EC tablet          Review of Systems  As mentioned above in the history present illness. All other systems were reviewed and are negative.    Physical Exam   BP: 116/64  Pulse: 85  Temp: 99  F (37.2  C)  Resp: 18  SpO2: 98 %      Physical Exam  Appearance: Alert and oriented. NAD.  Left foot:   -- no swelling, ecchymosis or erythema.  -- puncture wound noted plantar aspect, near distal 4th metatarsal.  No foreign body palpable or visualized.  -- severe tenderness with minimal palpation.    ED Course         Procedures              Results for orders placed or performed during the hospital encounter of 07/19/21 (from the past 24 hour(s))   Foot XR, G/E 3 views, left    Narrative    FOOT LEFT THREE OR MORE VIEWS July 19, 2021 1:06 PM     HISTORY: Evaluate for foreign body. Stepped on a tooth pick (near  distal fourth metatarsal).    COMPARISON: 9/23/2020.      Impression    IMPRESSION: Small plantar calcaneal spur. Otherwise unremarkable.       Medications - No data to display    Assessments & Plan (with Medical Decision Making)   No radiopaque foreign body is noted on imaging.  No clinical exam findings to suspect infection. I am not able to palpate or visualize a foreign body. She does have puncture wound noted where she states pain and had removed the toothpick yesterday.  I discussed with patient that I would not recommend attempting blind dissection for a foreign body as this may increase the risk for infection.    Plan:  Warm soapy soaks twice a day.  Recheck with podiatrist if not improving in the next couple days. [phone].  Recheck sooner for redness, swelling or drainage.    I have reviewed the nursing notes.    I have reviewed the findings, diagnosis, plan and need for follow up with the patient.      Discharge Medication List as of 7/19/2021  1:22 PM          Final diagnoses:   Puncture wound of plantar aspect of left foot, initial encounter       7/19/2021   [facility] EMERGENCY DEPT     Brittany, MARI William CNP  07/19/21 [id number]

## 2021-07-19 NOTE — DISCHARGE INSTRUCTIONS
Warm soapy soaks twice a day.  Recheck with podiatrist if not improving in the next couple days. [phone].  Recheck sooner for redness, swelling or drainage.

## 2021-07-20 ENCOUNTER — PATIENT OUTREACH (OUTPATIENT)
Dept: FAMILY MEDICINE | Facility: OTHER | Age: 31
End: 2021-07-20

## 2021-07-20 NOTE — TELEPHONE ENCOUNTER
ED / Discharge Outreach Protocol    Patient Contact    Attempt # 1    Was call answered?  No.  Unable to leave message. Unidentified voice mail.  Ellen Barba RN on 7/20/2021 at 1:57 PM

## 2021-07-20 NOTE — TELEPHONE ENCOUNTER
Reason for follow up call: Valarie Talavera appeared on our list for being seen in and recenlty discharge from the Emergency Room/In Patient Hospital Admission.    Chief Complaint   Patient presents with     Hospital F/U     ED Red 52% puncture wound of left foot       TCM Episode no    Encounter routed for Clinic Triage RN to call for follow up

## 2021-07-21 NOTE — TELEPHONE ENCOUNTER
ED / Discharge Outreach Protocol    Patient Contact    Attempt # 2    Was call answered?  No.  Left message on voicemail with information to call me back.    LISY Weaver/[place] bryn Egan

## 2021-07-22 NOTE — TELEPHONE ENCOUNTER
"  ED for acute condition Discharge Protocol    \"Hi, my name is Jessenia Bruno, a registered nurse, and I am calling from [facility].  I am calling to follow up and see how things are going for you after your recent emergency visit.\"    Tell me how you are doing now that you are home?\" Just fine      Discharge Instructions    \"Let's review your discharge instructions.  What is/are the follow-up recommendations?  Pt. Response: Yes    \"Has an appointment with your primary care provider been scheduled?\"  No (not needed)    Medications    \"Tell me what changed about your medicines when you discharged?\"    None    \"What questions do you have about your medications?\"   None        Call Summary    \"What questions or concerns do you have about your recent visit and your follow-up care?\"     none    \"If you have questions or things don't continue to improve, we encourage you contact us through the main clinic number (give number).  Even if the clinic is not open, triage nurses are available 24/7 to help you.     We would like you to know that our clinic has extended hours (provide information).  We also have urgent care (provide details on closest location and hours/contact info)\"    \"Thank you for your time and take care!\"        LISY Weaver/[place] [facility]        "

## 2021-09-19 ENCOUNTER — HEALTH MAINTENANCE LETTER (OUTPATIENT)
Age: 31
End: 2021-09-19

## 2022-03-06 ENCOUNTER — HEALTH MAINTENANCE LETTER (OUTPATIENT)
Age: 32
End: 2022-03-06

## 2022-10-03 ENCOUNTER — HOSPITAL ENCOUNTER (EMERGENCY)
Facility: CLINIC | Age: 32
Discharge: HOME | End: 2022-10-03
Attending: FAMILY MEDICINE | Admitting: FAMILY MEDICINE
Payer: COMMERCIAL

## 2022-10-03 VITALS
HEART RATE: 85 BPM | WEIGHT: 252.6 LBS | SYSTOLIC BLOOD PRESSURE: 131 MMHG | DIASTOLIC BLOOD PRESSURE: 68 MMHG | OXYGEN SATURATION: 99 % | TEMPERATURE: 97.7 F | RESPIRATION RATE: 18 BRPM | BODY MASS INDEX: 39.41 KG/M2

## 2022-10-03 DIAGNOSIS — M72.2 PLANTAR FASCIITIS, BILATERAL: ICD-10-CM

## 2022-10-03 DIAGNOSIS — M79.672 BILATERAL FOOT PAIN: ICD-10-CM

## 2022-10-03 DIAGNOSIS — M79.671 BILATERAL FOOT PAIN: ICD-10-CM

## 2022-10-03 PROCEDURE — 99284 EMERGENCY DEPT VISIT MOD MDM: CPT | Performed by: FAMILY MEDICINE

## 2022-10-03 PROCEDURE — 99283 EMERGENCY DEPT VISIT LOW MDM: CPT | Performed by: FAMILY MEDICINE

## 2022-10-03 RX ORDER — DICLOFENAC SODIUM 75 MG/1
75 TABLET, DELAYED RELEASE ORAL 2 TIMES DAILY
Qty: 30 TABLET | Refills: 0 | Status: SHIPPED | OUTPATIENT
Start: 2022-10-03 | End: 2023-01-16

## 2022-10-03 ASSESSMENT — ENCOUNTER SYMPTOMS
CHILLS: 0
BRUISES/BLEEDS EASILY: 1
FEVER: 0

## 2022-10-03 NOTE — ED PROVIDER NOTES
History     Chief Complaint   Patient presents with     Foot Pain     HPI  Valarie Talavera is a 32 year old female who presents to the emergency room secondary concerns of bilateral feet pain.  Patient states that she over the last several days to weeks she has had increasing pain to the anterior aspect of her forefoot region which extends and wraps around her foot into the heel.  She states that she does have a history for plantar fasciitis and has been wearing her night splints but admits that her orthotics need to be replaced and she is no longer using those.  Patient states that she has not noticed any significant swelling to the feet but notices occasional bruising to the feet.  She denies bruising elsewhere on her body.  She denies any fever or chills.  No open skin wounds.    Allergies:  Allergies   Allergen Reactions     Zithromax [Azithromycin] Other (See Comments)     hives       Problem List:    Patient Active Problem List    Diagnosis Date Noted     Tenosynovitis, de Quervain (right hand) 06/12/2019     Priority: Medium     Obesity (BMI 35.0-39.9) with comorbidity (H) 03/15/2019     Priority: Medium     Hyperlipidemia LDL goal <160 03/15/2019     Priority: Medium     Bilateral carpal tunnel syndrome 06/15/2018     Priority: Medium     Cubital tunnel syndrome of both upper extremities 06/15/2018     Priority: Medium     Term pregnancy 10/14/2017     Priority: Medium     Gastroesophageal reflux disease with esophagitis 10/04/2017     Priority: Medium     Back pain 08/23/2017     Priority: Medium     Hyperglycemia 07/13/2017     Priority: Medium     Nuchal cord, not applicable or unspecified fetus 06/23/2017     Priority: Medium     Encounter for supervision of other normal pregnancy in third trimester 03/11/2017     Priority: Medium     Vitamin D deficiency 02/02/2016     Priority: Medium     HSIL on Pap smear of cervix 02/02/2016     Priority: Medium     2/2/16: Pap - HSIL. Plan colp  2/11/16 [place].   HSIL. Age 25  Plan: per tele enc on 2/18/16 [place] and cytology in August.  Will have pathologist differentiate between WESLY 2 and 3 if possible.  If lesion is worse, then LEEP.  If it is the same or better, then colp and cytology in 6 months.    8/30/16 HSIL pap/+ HR HPV (not 16 or 18). [place] not completed.  Per Dr. Luna, have pt. Schedule colp bef 11/30/16.  9/9/16 Pt. Advised.  9/22/16 [place]. NO SHOW.  10/6/16 [place] visit. NO SHOW.  1/11/17 [place] not done. Tracking updated for 6 mo colp/pap.   3/10/17 ASCUS pap/+ HR HPV (not 16 or 18). approx 9 weeks pregnant. Plan: colp around 20 weeks gestation, approx 5/26/17  10/14/17 delivered baby. (6 wk pp due 11/25/17)  3/25/19 [place] bx: normal, per provider. Plan: Cotest in 1 due 3/25/20    3/3/21 Reminder letter  4/5/21 Reminder call - spoke to pt.   5/5/21 Lost to follow-up for pap tracking            Past Medical History:    Past Medical History:   Diagnosis Date     Anxiety      Depression with anxiety      Depressive disorder      HSIL on Pap smear of cervix 2/2/2016     Low blood pressure      Vitamin D deficiency        Past Surgical History:    Past Surgical History:   Procedure Laterality Date     NO HISTORY OF SURGERY         Family History:    Family History   Problem Relation Age of Onset     Hypertension No family hx of      Cerebrovascular Disease No family hx of      Hyperlipidemia No family hx of        Social History:  Marital Status:  Single [1]  Social History     Tobacco Use     Smoking status: Former Smoker     Types: Cigarettes     Smokeless tobacco: Never Used     Tobacco comment: very rare   Substance Use Topics     Alcohol use: No     Alcohol/week: 0.0 standard drinks     Drug use: No        Medications:    diclofenac (VOLTAREN) 75 MG EC tablet          Review of Systems   Constitutional: Negative for chills and fever.   Musculoskeletal:        Bilateral feet pain.  States that the pain is different than her typical plantar fasciitis type pain.    Hematological: Bruises/bleeds easily (Admits to some bruising to her feet at times but denies bruising elsewhere on her body.).   All other systems reviewed and are negative.      Physical Exam   BP: 131/68  Pulse: 85  Temp: 97.7  F (36.5  C)  Resp: 18  Weight: 114.6 kg (252 lb 9.6 oz)  SpO2: 99 %      Physical Exam  Vitals and nursing note reviewed.   Feet:      Right foot:      Skin integrity: Skin integrity normal. No skin breakdown, erythema or warmth.      Toenail Condition: Right toenails are normal.      Left foot:      Skin integrity: Skin integrity normal. No skin breakdown, erythema or warmth.      Toenail Condition: Left toenails are normal.      Comments: Patient with tenderness with palpation to the dorsal aspect of the forefoot bilaterally.  Compression of the metatarsal area reproduces the patient's pain consistent with possible neuroma.  Patient also with tenderness to the arch into the heel with palpation consistent with her plantar fasciitis.  No significant swelling or erythema noted on exam.  No deformity or crepitus noted bilaterally.  Neurological:      Mental Status: She is alert.         ED Course                 Procedures              Critical Care time:  none                 Assessments & Plan (with Medical Decision Making)  Patient with concerns of bilateral feet pain.  Patient with history for Planter fasciitis but has not been wearing her shoe inserts as they are broken down she states.  She has been wearing her nightly splints for the plantar fasciitis.  Patient with exam findings of tenderness to the metatarsal area bilaterally.  Concern for possible neuroma bilaterally.  Patient with findings consistent with plantar fasciitis as well.  Patient initiated on course of anti-inflammatory medication.  She was given a note for work.  She was encouraged to avoid wearing any narrowed toed shoes.  We discussed plantar fasciitis as well as Uriarte's neuroma type condition and she was given  instructions in written form on these issues and is encouraged to read and follow the instructions on the handouts.  Patient was referred to podiatry for further evaluation.     I have reviewed the nursing notes.    I have reviewed the findings, diagnosis, plan and need for follow up with the patient.       Discharge Medication List as of 10/3/2022  4:29 PM               I verbally discussed the findings of the evaluation today in the ER. I have verbally discussed with Valarie the suggested treatment(s) as described in the discharge instructions and handouts. I have prescribed the above listed medications and instructed her on appropriate use of these medications.      I have verbally suggested she follow-up in her clinic or return to the ER for increased symptoms. See the follow-up recommendations documented  in the after visit summary in this visit's EPIC chart.      Disclaimer: This note consists of words and symbols derived from keyboarding and dictation using voice recognition software.  As a result, there may be errors that have gone undetected.  Please consider this when interpreting information found in this note.    Final diagnoses:   Bilateral foot pain - possible exacerbation of Planter fasciitis versus Uriarte's neuroma bilaterally       10/3/2022   [facility] EMERGENCY DEPT     Marlo Alanis,   10/03/22 1929

## 2022-10-03 NOTE — ED TRIAGE NOTES
Pt c/o bilateral foot pain x 2 weeks.       Triage Assessment     Row Name 10/03/22 1559       Respiratory WDL    Respiratory WDL WDL       Cardiac WDL    Cardiac WDL WDL

## 2022-10-03 NOTE — LETTER
October 3, 2022      To Whom It May Concern:      Valarie Talavera was seen in our Emergency Department today, 10/03/22.  I expect her condition to improve over the next 1-3 days.  She may return to work/school when improved.    Sincerely,        Marlo Alanis  Physician  [facility] Emergency Room

## 2022-10-03 NOTE — ED NOTES
Bed: ED07  Expected date:   Expected time:   Means of arrival:   Comments:  Valarie Talavera- in room now

## 2022-10-17 ENCOUNTER — HOSPITAL ENCOUNTER (EMERGENCY)
Facility: CLINIC | Age: 32
Discharge: HOME | End: 2022-10-17
Attending: NURSE PRACTITIONER | Admitting: NURSE PRACTITIONER
Payer: COMMERCIAL

## 2022-10-17 ENCOUNTER — APPOINTMENT (OUTPATIENT)
Dept: GENERAL RADIOLOGY | Facility: CLINIC | Age: 32
End: 2022-10-17
Attending: NURSE PRACTITIONER
Payer: COMMERCIAL

## 2022-10-17 VITALS
DIASTOLIC BLOOD PRESSURE: 97 MMHG | OXYGEN SATURATION: 96 % | SYSTOLIC BLOOD PRESSURE: 142 MMHG | HEIGHT: 68 IN | HEART RATE: 69 BPM | BODY MASS INDEX: 36.68 KG/M2 | WEIGHT: 242 LBS | TEMPERATURE: 97.9 F | RESPIRATION RATE: 20 BRPM

## 2022-10-17 DIAGNOSIS — S90.32XA CONTUSION OF LEFT FOOT, INITIAL ENCOUNTER: ICD-10-CM

## 2022-10-17 PROCEDURE — 250N000011 HC RX IP 250 OP 636: Performed by: NURSE PRACTITIONER

## 2022-10-17 PROCEDURE — 99284 EMERGENCY DEPT VISIT MOD MDM: CPT | Performed by: NURSE PRACTITIONER

## 2022-10-17 PROCEDURE — 99283 EMERGENCY DEPT VISIT LOW MDM: CPT | Performed by: NURSE PRACTITIONER

## 2022-10-17 PROCEDURE — 250N000013 HC RX MED GY IP 250 OP 250 PS 637: Performed by: NURSE PRACTITIONER

## 2022-10-17 PROCEDURE — 73630 X-RAY EXAM OF FOOT: CPT | Mod: LT

## 2022-10-17 RX ORDER — OXYCODONE HYDROCHLORIDE 5 MG/1
5 TABLET ORAL EVERY 6 HOURS PRN
Status: DISCONTINUED | OUTPATIENT
Start: 2022-10-17 | End: 2022-10-18 | Stop reason: HOSPADM

## 2022-10-17 RX ORDER — OXYCODONE HYDROCHLORIDE 5 MG/1
5 TABLET ORAL EVERY 6 HOURS PRN
Qty: 6 TABLET | Refills: 0 | Status: SHIPPED | OUTPATIENT
Start: 2022-10-17 | End: 2023-01-16

## 2022-10-17 RX ORDER — OXYCODONE HYDROCHLORIDE 5 MG/1
5 TABLET ORAL EVERY 6 HOURS PRN
Qty: 1 TABLET | Refills: 0 | Status: SHIPPED | OUTPATIENT
Start: 2022-10-17 | End: 2023-01-16

## 2022-10-17 RX ORDER — ONDANSETRON 4 MG/1
4 TABLET, ORALLY DISINTEGRATING ORAL ONCE
Status: COMPLETED | OUTPATIENT
Start: 2022-10-17 | End: 2022-10-17

## 2022-10-17 RX ADMIN — ONDANSETRON 4 MG: 4 TABLET, ORALLY DISINTEGRATING ORAL at 22:17

## 2022-10-17 RX ADMIN — OXYCODONE HYDROCHLORIDE 5 MG: 5 TABLET ORAL at 22:55

## 2022-10-17 NOTE — LETTER
October 17, 2022      To Whom It May Concern:      Valarie MICHELA Salgadoon was seen in our Emergency Department today, 10/17/22.   No work 10/18/2022.    Sincerely,        MARI Rowley CNP

## 2022-10-18 NOTE — ED PROVIDER NOTES
History     Chief Complaint   Patient presents with     Foot Injury     HPI  Valarie Talavera is a 32 year old female who presents for evaluation of left foot pain after she dropped a cabinet on the left foot just prior to arrival. Patient is ambulatory. She is nauseated from the severe pain.  Additionally, she reports having Planter fasciitis and neuromas in both her feet which caused her to have severe pain.  She states she is had a hard time sleeping the last few days because of this pain.  She is tells me she is following with Dr. Owen (podiatry).      Allergies:  Allergies   Allergen Reactions     Zithromax [Azithromycin] Other (See Comments)     hives       Problem List:    Patient Active Problem List    Diagnosis Date Noted     Tenosynovitis, de Quervain (right hand) 06/12/2019     Priority: Medium     Obesity (BMI 35.0-39.9) with comorbidity (H) 03/15/2019     Priority: Medium     Hyperlipidemia LDL goal <160 03/15/2019     Priority: Medium     Bilateral carpal tunnel syndrome 06/15/2018     Priority: Medium     Cubital tunnel syndrome of both upper extremities 06/15/2018     Priority: Medium     Term pregnancy 10/14/2017     Priority: Medium     Gastroesophageal reflux disease with esophagitis 10/04/2017     Priority: Medium     Back pain 08/23/2017     Priority: Medium     Hyperglycemia 07/13/2017     Priority: Medium     Nuchal cord, not applicable or unspecified fetus 06/23/2017     Priority: Medium     Encounter for supervision of other normal pregnancy in third trimester 03/11/2017     Priority: Medium     Vitamin D deficiency 02/02/2016     Priority: Medium     HSIL on Pap smear of cervix 02/02/2016     Priority: Medium     2/2/16: Pap - HSIL. Plan colp  2/11/16 [place].  HSIL. Age 25  Plan: per tele enc on 2/18/16 [place] and cytology in August.  Will have pathologist differentiate between WESLY 2 and 3 if possible.  If lesion is worse, then LEEP.  If it is the same or better, then colp and cytology in 6  "months.    8/30/16 HSIL pap/+ HR HPV (not 16 or 18). [place] not completed.  Per Dr. Luna, have pt. Schedule colp bef 11/30/16.  9/9/16 Pt. Advised.  9/22/16 [place]. NO SHOW.  10/6/16 [place] visit. NO SHOW.  1/11/17 [place] not done. Tracking updated for 6 mo colp/pap.   3/10/17 ASCUS pap/+ HR HPV (not 16 or 18). approx 9 weeks pregnant. Plan: colp around 20 weeks gestation, approx 5/26/17  10/14/17 delivered baby. (6 wk pp due 11/25/17)  3/25/19 [place] bx: normal, per provider. Plan: Cotest in 1 due 3/25/20    3/3/21 Reminder letter  4/5/21 Reminder call - spoke to pt.   5/5/21 Lost to follow-up for pap tracking            Past Medical History:    Past Medical History:   Diagnosis Date     Anxiety      Depression with anxiety      Depressive disorder      HSIL on Pap smear of cervix 2/2/2016     Low blood pressure      Vitamin D deficiency        Past Surgical History:    Past Surgical History:   Procedure Laterality Date     NO HISTORY OF SURGERY         Family History:    Family History   Problem Relation Age of Onset     Hypertension No family hx of      Cerebrovascular Disease No family hx of      Hyperlipidemia No family hx of        Social History:  Marital Status:  Single [1]  Social History     Tobacco Use     Smoking status: Former     Types: Cigarettes     Smokeless tobacco: Never     Tobacco comments:     very rare   Substance Use Topics     Alcohol use: No     Alcohol/week: 0.0 standard drinks     Drug use: No        Medications:    oxyCODONE (ROXICODONE) 5 MG tablet  oxyCODONE (ROXICODONE) 5 MG tablet  diclofenac (VOLTAREN) 75 MG EC tablet          Review of Systems  As mentioned above in the history present illness. All other systems were reviewed and are negative.    Physical Exam   BP: (!) 142/97  Pulse: 69  Temp: 97.9  F (36.6  C)  Resp: 18  Height: 172.7 cm (5' 8\")  Weight: 109.8 kg (242 lb)  SpO2: 96 %      Physical Exam  Constitutional:       General: She is not in acute distress.  HENT:      Head: " Normocephalic and atraumatic.      Right Ear: External ear normal.      Left Ear: External ear normal.      Mouth/Throat:      Pharynx: Oropharynx is clear.   Eyes:      Conjunctiva/sclera: Conjunctivae normal.   Cardiovascular:      Rate and Rhythm: Normal rate.   Pulmonary:      Effort: Pulmonary effort is normal.   Musculoskeletal:      Comments: Left foot:  --TTP over the distal fifth metatarsal.   --no obvious swelling.  --no erythema or ecchymosis.  --small superficial abrasion to the medial left fifth toe.   Skin:     General: Skin is warm and dry.   Neurological:      General: No focal deficit present.      Mental Status: She is alert and oriented to person, place, and time.              ED Course                 Procedures              Results for orders placed or performed during the hospital encounter of 10/17/22 (from the past 24 hour(s))   Foot XR, G/E 3 views, left    Narrative    EXAM: XR FOOT LEFT G/E 3 VIEWS  LOCATION: [facility]  DATE/TIME: 10/17/2022 9:53 PM    INDICATION: crush injury mostly over the 5th toe. Toe pain.  COMPARISON: None.      Impression    IMPRESSION: Normal joint spaces and alignment. No fracture.       Medications   oxyCODONE (ROXICODONE) tablet 5 mg (5 mg Oral Given 10/17/22 1850)   ondansetron (ZOFRAN ODT) ODT tab 4 mg (4 mg Oral Given 10/17/22 2217)       Assessments & Plan (with Medical Decision Making)   Rest left foot, elevate as much as possible, and ice packs intermittently.  Tylenol 650 mg every 4-6 hours as needed for pain.  Oxycodone 5 mg every 6 hours as needed for severe pain. Use sparingly. Narcotics are addictive. Do not drive or drink alcohol while taking this medication.  Follow-up with Dr. Owen regarding neuromas.     Discharge Medication List as of 10/17/2022 10:14 PM      START taking these medications    Details   oxyCODONE (ROXICODONE) 5 MG tablet Take 1 tablet (5 mg) by mouth every 6 hours as needed for severe pain, Disp-6  tablet, R-0, [facility]             Final diagnoses:   Contusion of left foot, initial encounter       10/17/2022   [facility] EMERGENCY DEPT     Brittany, MARI William CNP  10/18/22 0009

## 2022-10-18 NOTE — DISCHARGE INSTRUCTIONS
Rest left foot, elevate as much as possible, and ice packs intermittently.  Tylenol 650 mg every 4-6 hours as needed for pain.  Oxycodone 5 mg every 6 hours as needed for severe pain. Use sparingly. Narcotics are addictive. Do not drive or drink alcohol while taking this medication.  Follow-up with Dr. Owen regarding neuromas.

## 2022-11-20 ENCOUNTER — HEALTH MAINTENANCE LETTER (OUTPATIENT)
Age: 32
End: 2022-11-20

## 2023-01-16 ENCOUNTER — HOSPITAL ENCOUNTER (EMERGENCY)
Facility: CLINIC | Age: 33
Discharge: HOME | End: 2023-01-16
Attending: PHYSICIAN ASSISTANT | Admitting: PHYSICIAN ASSISTANT
Payer: COMMERCIAL

## 2023-01-16 VITALS
RESPIRATION RATE: 18 BRPM | HEART RATE: 60 BPM | SYSTOLIC BLOOD PRESSURE: 121 MMHG | TEMPERATURE: 98.2 F | DIASTOLIC BLOOD PRESSURE: 79 MMHG | OXYGEN SATURATION: 96 %

## 2023-01-16 DIAGNOSIS — S61.019A THUMB LACERATION: ICD-10-CM

## 2023-01-16 PROCEDURE — 99282 EMERGENCY DEPT VISIT SF MDM: CPT | Performed by: PHYSICIAN ASSISTANT

## 2023-01-16 RX ORDER — BUPIVACAINE HYDROCHLORIDE 5 MG/ML
10 INJECTION, SOLUTION EPIDURAL; INTRACAUDAL ONCE
Status: DISCONTINUED | OUTPATIENT
Start: 2023-01-16 | End: 2023-01-16 | Stop reason: HOSPADM

## 2023-01-17 NOTE — DISCHARGE INSTRUCTIONS
It was a pleasure working with you today!  I hope your condition improves rapidly!     Your wound will heal by secondary intention slowly over the next 3-4 weeks.  Change your dressing daily.  Use bacitracin ointment on the wound.  Then use Vaseline nonstick dressing, gauze, and then you can wrap it with Kerlix.  Keep your current dressing on for the first 24 hours.  Try and avoid getting your wound wet for the first 3 days.  Then, a good time to change your dressing is after a shower when the dressing material is wet.  Follow-up with Dr. Crisostomo in 7-10 days for further guidance if you feel the wound is not healing appropriately.

## 2023-01-17 NOTE — ED PROVIDER NOTES
History     Chief Complaint   Patient presents with     Laceration     HPI  Valarie Talavera is a 32 year old female who presents for evaluation of a laceration to the end of her left thumb that occurred just prior to arrival while cutting onions.  Bleeding was controlled with pressure.  She was slicing onions when she cut a small portion of the tip of her thumb.  Denies any alteration to her range of motion of the IP joint.  Immunizations are up-to-date with tetanus in 2017.  This was a brand-new knife that she was using.    Allergies:  Allergies   Allergen Reactions     Zithromax [Azithromycin] Other (See Comments)     hives       Problem List:    Patient Active Problem List    Diagnosis Date Noted     Tenosynovitis, de Quervain (right hand) 06/12/2019     Priority: Medium     Obesity (BMI 35.0-39.9) with comorbidity (H) 03/15/2019     Priority: Medium     Hyperlipidemia LDL goal <160 03/15/2019     Priority: Medium     Bilateral carpal tunnel syndrome 06/15/2018     Priority: Medium     Cubital tunnel syndrome of both upper extremities 06/15/2018     Priority: Medium     Term pregnancy 10/14/2017     Priority: Medium     Gastroesophageal reflux disease with esophagitis 10/04/2017     Priority: Medium     Back pain 08/23/2017     Priority: Medium     Hyperglycemia 07/13/2017     Priority: Medium     Nuchal cord, not applicable or unspecified fetus 06/23/2017     Priority: Medium     Encounter for supervision of other normal pregnancy in third trimester 03/11/2017     Priority: Medium     Vitamin D deficiency 02/02/2016     Priority: Medium     HSIL on Pap smear of cervix 02/02/2016     Priority: Medium     2/2/16: Pap - HSIL. Plan colp  2/11/16 [place].  HSIL. Age 25  Plan: per tele enc on 2/18/16 [place] and cytology in August.  Will have pathologist differentiate between WESLY 2 and 3 if possible.  If lesion is worse, then LEEP.  If it is the same or better, then colp and cytology in 6 months.    8/30/16 HSIL pap/+ HR  HPV (not 16 or 18). [place] not completed.  Per Dr. Luna, have pt. Schedule colp bef 11/30/16.  9/9/16 Pt. Advised.  9/22/16 [place]. NO SHOW.  10/6/16 [place] visit. NO SHOW.  1/11/17 [place] not done. Tracking updated for 6 mo colp/pap.   3/10/17 ASCUS pap/+ HR HPV (not 16 or 18). approx 9 weeks pregnant. Plan: colp around 20 weeks gestation, approx 5/26/17  10/14/17 delivered baby. (6 wk pp due 11/25/17)  3/25/19 [place] bx: normal, per provider. Plan: Cotest in 1 due 3/25/20    3/3/21 Reminder letter  4/5/21 Reminder call - spoke to pt.   5/5/21 Lost to follow-up for pap tracking            Past Medical History:    Past Medical History:   Diagnosis Date     Anxiety      Depression with anxiety      Depressive disorder      HSIL on Pap smear of cervix 2/2/2016     Low blood pressure      Vitamin D deficiency        Past Surgical History:    Past Surgical History:   Procedure Laterality Date     NO HISTORY OF SURGERY         Family History:    Family History   Problem Relation Age of Onset     Hypertension No family hx of      Cerebrovascular Disease No family hx of      Hyperlipidemia No family hx of        Social History:  Marital Status:  Single [1]  Social History     Tobacco Use     Smoking status: Former     Types: Cigarettes     Smokeless tobacco: Never     Tobacco comments:     very rare   Substance Use Topics     Alcohol use: No     Alcohol/week: 0.0 standard drinks     Drug use: No        Medications:    No current outpatient medications on file.        Review of Systems   All other systems reviewed and are negative.      Physical Exam   BP: 121/79  Pulse: 60  Temp: 98.2  F (36.8  C)  Resp: 18  SpO2: 96 %      Physical Exam  Vitals and nursing note reviewed.   Constitutional:       General: She is not in acute distress.     Appearance: She is not diaphoretic.   HENT:      Head: Normocephalic and atraumatic.      Right Ear: External ear normal.      Left Ear: External ear normal.      Nose: Nose normal.       Mouth/Throat:      Pharynx: No oropharyngeal exudate.   Eyes:      General: No scleral icterus.        Right eye: No discharge.         Left eye: No discharge.      Conjunctiva/sclera: Conjunctivae normal.      Pupils: Pupils are equal, round, and reactive to light.   Neck:      Thyroid: No thyromegaly.   Cardiovascular:      Rate and Rhythm: Normal rate and regular rhythm.      Heart sounds: Normal heart sounds. No murmur heard.  Pulmonary:      Effort: Pulmonary effort is normal. No respiratory distress.      Breath sounds: Normal breath sounds. No wheezing or rales.   Chest:      Chest wall: No tenderness.   Abdominal:      General: Bowel sounds are normal. There is no distension.      Palpations: Abdomen is soft. There is no mass.      Tenderness: There is no abdominal tenderness. There is no guarding or rebound.   Musculoskeletal:         General: No deformity. Normal range of motion.      Cervical back: Normal range of motion and neck supple.      Comments: Patient with a loss of tissue laceration at the distal end of the thumb that measures about 1.5 cm in diameter.  It is more superficial laceration and does not come close to the bone.  No loss of nail plate.  Normal range of motion of the IP joint.  Sensation intact to light touch.  Bleeding had already can been controlled with pressure.  There is nothing to be repaired.   Lymphadenopathy:      Cervical: No cervical adenopathy.   Skin:     General: Skin is warm and dry.      Capillary Refill: Capillary refill takes less than 2 seconds.      Findings: No erythema or rash.   Neurological:      Mental Status: She is alert and oriented to person, place, and time.      Cranial Nerves: No cranial nerve deficit.   Psychiatric:         Behavior: Behavior normal.         Thought Content: Thought content normal.         ED Course                 Procedures              Critical Care time:  none               No results found for this or any previous visit (from the  past 24 hour(s)).    Medications   bupivacaine (MARCAINE) 0.5% preservative free injection (has no administration in time range)       Assessments & Plan (with Medical Decision Making)  Thumb laceration     32 year old female presents for evaluation of a laceration to her left distal thumb.  Loss of tissue.  No other injury.  Immunizations up-to-date.  She has a 1.5 cm diameter laceration without significant depth and no injury to the nail plate.  No suturing can be performed.  Bleeding already controlled with pressure.  We provided local anesthesia with 1.5 mL of 0.5% bupivacaine without epinephrine.  Wound was aggressively washed with soap and water.  Bacitracin ointment, Vaseline nonstick dressing and gauze applied.  Tube gauze utilized.  Proper wound care measures reviewed.  Change dressing daily.  This can take 3-4 weeks to completely heal.  Follow-up with primary care in 7-10 days for guidance if needed.  Patient was in agreement.     I have reviewed the nursing notes.    I have reviewed the findings, diagnosis, plan and need for follow up with the patient.       Medical Decision Making  The patient presented with a problem that is acute and uncomplicated.    The patient's evaluation involved:  history and exam without other MDM data elements    The patient's management involved only simple and very low risk treatment.        New Prescriptions    No medications on file       Final diagnoses:   Thumb laceration     Disclaimer: This note consists of symbols derived from keyboarding, dictation and/or voice recognition software. As a result, there may be errors in the script that have gone undetected. Please consider this when interpreting information found in this chart.      1/16/2023   [facility] EMERGENCY DEPT     Pérez Martinez PA-C  01/16/23 2012

## 2023-04-15 ENCOUNTER — HEALTH MAINTENANCE LETTER (OUTPATIENT)
Age: 33
End: 2023-04-15

## 2023-04-25 ENCOUNTER — HOSPITAL ENCOUNTER (EMERGENCY)
Facility: CLINIC | Age: 33
Discharge: HOME | End: 2023-04-25
Attending: PHYSICIAN ASSISTANT | Admitting: PHYSICIAN ASSISTANT
Payer: COMMERCIAL

## 2023-04-25 VITALS
WEIGHT: 250 LBS | HEART RATE: 90 BPM | RESPIRATION RATE: 18 BRPM | TEMPERATURE: 98.3 F | BODY MASS INDEX: 38.01 KG/M2 | DIASTOLIC BLOOD PRESSURE: 89 MMHG | OXYGEN SATURATION: 100 % | SYSTOLIC BLOOD PRESSURE: 137 MMHG

## 2023-04-25 DIAGNOSIS — H10.32 ACUTE BACTERIAL CONJUNCTIVITIS OF LEFT EYE: ICD-10-CM

## 2023-04-25 PROCEDURE — 99284 EMERGENCY DEPT VISIT MOD MDM: CPT | Performed by: PHYSICIAN ASSISTANT

## 2023-04-25 PROCEDURE — 99283 EMERGENCY DEPT VISIT LOW MDM: CPT

## 2023-04-25 PROCEDURE — 250N000009 HC RX 250: Performed by: PHYSICIAN ASSISTANT

## 2023-04-25 RX ORDER — TETRACAINE HYDROCHLORIDE 5 MG/ML
1-2 SOLUTION OPHTHALMIC ONCE
Status: COMPLETED | OUTPATIENT
Start: 2023-04-25 | End: 2023-04-25

## 2023-04-25 RX ORDER — CIPROFLOXACIN HYDROCHLORIDE 3.5 MG/ML
1-2 SOLUTION/ DROPS TOPICAL EVERY 4 HOURS
Qty: 5 ML | Refills: 0 | Status: SHIPPED | OUTPATIENT
Start: 2023-04-25 | End: 2024-02-19

## 2023-04-25 RX ADMIN — TETRACAINE HYDROCHLORIDE 2 DROP: 5 SOLUTION OPHTHALMIC at 20:34

## 2023-04-26 ENCOUNTER — HOSPITAL ENCOUNTER (EMERGENCY)
Facility: CLINIC | Age: 33
Discharge: HOME | End: 2023-04-26
Attending: FAMILY MEDICINE | Admitting: FAMILY MEDICINE
Payer: COMMERCIAL

## 2023-04-26 VITALS
WEIGHT: 250 LBS | BODY MASS INDEX: 38.01 KG/M2 | SYSTOLIC BLOOD PRESSURE: 112 MMHG | OXYGEN SATURATION: 96 % | RESPIRATION RATE: 20 BRPM | DIASTOLIC BLOOD PRESSURE: 58 MMHG | TEMPERATURE: 98.3 F | HEART RATE: 77 BPM

## 2023-04-26 DIAGNOSIS — S00.81XA CAT SCRATCH OF FACE, INITIAL ENCOUNTER: ICD-10-CM

## 2023-04-26 DIAGNOSIS — R22.0 FACIAL SWELLING: ICD-10-CM
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
## 2023-04-26 LAB
BASOPHILS # BLD AUTO: 0 10E3/UL (ref 0–0.2)
BASOPHILS NFR BLD AUTO: 0 %
CRP SERPL-MCNC: 12.88 MG/L
EOSINOPHIL # BLD AUTO: 0.2 10E3/UL (ref 0–0.7)
EOSINOPHIL NFR BLD AUTO: 2 %
ERYTHROCYTE [DISTWIDTH] IN BLOOD BY AUTOMATED COUNT: 12.9 % (ref 10–15)
ERYTHROCYTE [SEDIMENTATION RATE] IN BLOOD BY WESTERGREN METHOD: 21 MM/HR (ref 0–20)
HCT VFR BLD AUTO: 39.1 % (ref 35–47)
HGB BLD-MCNC: 13.1 G/DL (ref 11.7–15.7)
IMM GRANULOCYTES # BLD: 0 10E3/UL
IMM GRANULOCYTES NFR BLD: 0 %
LYMPHOCYTES # BLD AUTO: 2.8 10E3/UL (ref 0.8–5.3)
LYMPHOCYTES NFR BLD AUTO: 28 %
MCH RBC QN AUTO: 29.7 PG (ref 26.5–33)
MCHC RBC AUTO-ENTMCNC: 33.5 G/DL (ref 31.5–36.5)
MCV RBC AUTO: 89 FL (ref 78–100)
MONOCYTES # BLD AUTO: 1 10E3/UL (ref 0–1.3)
MONOCYTES NFR BLD AUTO: 10 %
NEUTROPHILS # BLD AUTO: 6.1 10E3/UL (ref 1.6–8.3)
NEUTROPHILS NFR BLD AUTO: 60 %
NRBC # BLD AUTO: 0 10E3/UL
NRBC BLD AUTO-RTO: 0 /100
PLATELET # BLD AUTO: 337 10E3/UL (ref 150–450)
RBC # BLD AUTO: 4.41 10E6/UL (ref 3.8–5.2)
WBC # BLD AUTO: 10.1 10E3/UL (ref 4–11)

## 2023-04-26 PROCEDURE — 96365 THER/PROPH/DIAG IV INF INIT: CPT | Performed by: FAMILY MEDICINE

## 2023-04-26 PROCEDURE — 99284 EMERGENCY DEPT VISIT MOD MDM: CPT | Performed by: FAMILY MEDICINE

## 2023-04-26 PROCEDURE — 85025 COMPLETE CBC W/AUTO DIFF WBC: CPT | Performed by: FAMILY MEDICINE

## 2023-04-26 PROCEDURE — 250N000013 HC RX MED GY IP 250 OP 250 PS 637: Performed by: FAMILY MEDICINE

## 2023-04-26 PROCEDURE — 250N000011 HC RX IP 250 OP 636: Performed by: FAMILY MEDICINE

## 2023-04-26 PROCEDURE — 99284 EMERGENCY DEPT VISIT MOD MDM: CPT | Mod: 25 | Performed by: FAMILY MEDICINE

## 2023-04-26 PROCEDURE — 86140 C-REACTIVE PROTEIN: CPT | Performed by: FAMILY MEDICINE

## 2023-04-26 PROCEDURE — 36415 COLL VENOUS BLD VENIPUNCTURE: CPT | Performed by: FAMILY MEDICINE

## 2023-04-26 PROCEDURE — 85652 RBC SED RATE AUTOMATED: CPT | Performed by: FAMILY MEDICINE

## 2023-04-26 RX ORDER — SULFAMETHOXAZOLE/TRIMETHOPRIM 800-160 MG
1 TABLET ORAL 2 TIMES DAILY
Qty: 20 TABLET | Refills: 0 | Status: SHIPPED | OUTPATIENT
Start: 2023-04-26 | End: 2023-05-06

## 2023-04-26 RX ORDER — CEFTRIAXONE 1 G/1
1 INJECTION, POWDER, FOR SOLUTION INTRAMUSCULAR; INTRAVENOUS ONCE
Status: COMPLETED | OUTPATIENT
Start: 2023-04-26 | End: 2023-04-26

## 2023-04-26 RX ORDER — SULFAMETHOXAZOLE/TRIMETHOPRIM 800-160 MG
1 TABLET ORAL ONCE
Status: COMPLETED | OUTPATIENT
Start: 2023-04-26 | End: 2023-04-26

## 2023-04-26 RX ADMIN — SULFAMETHOXAZOLE AND TRIMETHOPRIM 1 TABLET: 800; 160 TABLET ORAL at 21:16

## 2023-04-26 RX ADMIN — CEFTRIAXONE SODIUM 1 G: 1 INJECTION, POWDER, FOR SOLUTION INTRAMUSCULAR; INTRAVENOUS at 21:16

## 2023-04-26 ASSESSMENT — ACTIVITIES OF DAILY LIVING (ADL): ADLS_ACUITY_SCORE: 37

## 2023-04-26 NOTE — ED PROVIDER NOTES
History     Chief Complaint   Patient presents with     Eye Problem       HPI  Valarie Talavera is a 32 year old female who presents to the emergency department complaining of left eye pain. The patient reports last night her grandmother's cat scratched her in her left eye.  Since then she has had progressively worsening pain and today she has developed mattering/weeping out of the eye.  No reported fevers.  Vision is mildly blurry.  Denies any other injuries.        Allergies:  Allergies   Allergen Reactions     Zithromax [Azithromycin] Other (See Comments)     hives       Problem List:    Patient Active Problem List    Diagnosis Date Noted     Tenosynovitis, de Quervain (right hand) 06/12/2019     Priority: Medium     Obesity (BMI 35.0-39.9) with comorbidity (H) 03/15/2019     Priority: Medium     Hyperlipidemia LDL goal <160 03/15/2019     Priority: Medium     Bilateral carpal tunnel syndrome 06/15/2018     Priority: Medium     Cubital tunnel syndrome of both upper extremities 06/15/2018     Priority: Medium     Term pregnancy 10/14/2017     Priority: Medium     Gastroesophageal reflux disease with esophagitis 10/04/2017     Priority: Medium     Back pain 08/23/2017     Priority: Medium     Hyperglycemia 07/13/2017     Priority: Medium     Nuchal cord, not applicable or unspecified fetus 06/23/2017     Priority: Medium     Encounter for supervision of other normal pregnancy in third trimester 03/11/2017     Priority: Medium     Vitamin D deficiency 02/02/2016     Priority: Medium     HSIL on Pap smear of cervix 02/02/2016     Priority: Medium     2/2/16: Pap - HSIL. Plan colp  2/11/16 [place].  HSIL. Age 25  Plan: per tele enc on 2/18/16 [place] and cytology in August.  Will have pathologist differentiate between WESLY 2 and 3 if possible.  If lesion is worse, then LEEP.  If it is the same or better, then colp and cytology in 6 months.    8/30/16 HSIL pap/+ HR HPV (not 16 or 18). [place] not completed.  Per Dr. Luna, have  pt. Schedule colp bef 11/30/16.  9/9/16 Pt. Advised.  9/22/16 [place]. NO SHOW.  10/6/16 [place] visit. NO SHOW.  1/11/17 [place] not done. Tracking updated for 6 mo colp/pap.   3/10/17 ASCUS pap/+ HR HPV (not 16 or 18). approx 9 weeks pregnant. Plan: colp around 20 weeks gestation, approx 5/26/17  10/14/17 delivered baby. (6 wk pp due 11/25/17)  3/25/19 [place] bx: normal, per provider. Plan: Cotest in 1 due 3/25/20    3/3/21 Reminder letter  4/5/21 Reminder call - spoke to pt.   5/5/21 Lost to follow-up for pap tracking            Past Medical History:    Past Medical History:   Diagnosis Date     Anxiety      Depression with anxiety      Depressive disorder      HSIL on Pap smear of cervix 2/2/2016     Low blood pressure      Vitamin D deficiency        Past Surgical History:    Past Surgical History:   Procedure Laterality Date     NO HISTORY OF SURGERY         Family History:    Family History   Problem Relation Age of Onset     Hypertension No family hx of      Cerebrovascular Disease No family hx of      Hyperlipidemia No family hx of        Social History:  Marital Status:  Single [1]  Social History     Tobacco Use     Smoking status: Former     Types: Cigarettes     Smokeless tobacco: Never     Tobacco comments:     very rare   Substance Use Topics     Alcohol use: No     Alcohol/week: 0.0 standard drinks of alcohol     Drug use: No        Medications:    ciprofloxacin (CILOXAN) 0.3 % ophthalmic solution          Review of Systems   All other systems reviewed and are negative.        Physical Exam   BP: 137/89  Pulse: 90  Temp: 98.3  F (36.8  C)  Resp: 18  Weight: 113.4 kg (250 lb)  SpO2: 100 %      Physical Exam  Vitals and nursing note reviewed.   Constitutional:       General: She is not in acute distress.     Appearance: Normal appearance. She is obese. She is not ill-appearing, toxic-appearing or diaphoretic.   HENT:      Head: Normocephalic and atraumatic.      Mouth/Throat:      Mouth: Mucous membranes are  moist.   Eyes:      Extraocular Movements: Extraocular movements intact.      Pupils: Pupils are equal, round, and reactive to light.      Comments: Left eye: Lids everted, no foreign bodies.  Fluorescein exam demonstrated no uptake.  Diffuse conjunctival injection noted with some mucoid drainage.   Pulmonary:      Effort: Pulmonary effort is normal. No respiratory distress.   Musculoskeletal:      Cervical back: Neck supple.   Skin:     General: Skin is warm and dry.   Neurological:      General: No focal deficit present.      Mental Status: She is alert and oriented to person, place, and time. Mental status is at baseline.   Psychiatric:         Mood and Affect: Mood normal.         Behavior: Behavior normal.            ED Course           Procedures      No results found for this or any previous visit (from the past 24 hour(s)).    Medications   tetracaine (PONTOCAINE) 0.5 % ophthalmic solution 1-2 drop (2 drops Left Eye $Given 4/25/23 2034)         Assessments & Plan (with Medical Decision Making)  Valarie Talavera is a 32 year old female who presented to the ED complaining of left eye pain after her cat scratched her in the eye yesterday.  Denies any other injuries.  On exam, she had diffuse conjunctival injection to the left eye with mucoid discharge.  Pupils are equal round and reactive, EOM intact.  Fluorescein exam of the left eye demonstrated no uptake however.  She did note pain in the lower eyelid itself, I did not see any obvious laceration to the inner eyelid however she certainly could have an injury to that region from the cat scratching.  I also am concerned given the new discharge that she has developed an infection.  Her last tetanus was in 2017, I recommended updating it today so it is within 5 years but she declines.  She was agreeable to treating this infectious conjunctivitis with a course of antibiotic drops, ciprofloxacin prescribed.  She was encouraged to use cold compresses to the eye in  addition to ibuprofen or Tylenol.  She was advised to follow-up with an eye doctor in a couple days if no improvement.  She was also provided instructions to return to the ED if she does have any worsening symptoms.  All questions answered and patient discharged home in suitable condition.     I have reviewed the nursing notes.    I have reviewed the findings, diagnosis, plan and need for follow up with the patient.      New Prescriptions    CIPROFLOXACIN (CILOXAN) 0.3 % OPHTHALMIC SOLUTION    Place 1-2 drops Into the left eye every 4 hours       Final diagnoses:   Acute bacterial conjunctivitis of left eye     Note: Chart documentation done in part with Dragon Voice Recognition software. Although reviewed after completion, some word and grammatical errors may remain.     4/25/2023   [facility] EMERGENCY DEPT     Sylvia Jung PA-C  04/25/23 2051

## 2023-04-26 NOTE — DISCHARGE INSTRUCTIONS
Please start using the antibiotic drops prescribed to help treat probable infection.  Apply cold compresses to the eye for comfort in addition to ibuprofen or Tylenol use as needed.  Follow-up with an eye doctor if things are not improved in a couple days.  If you do have any worsening symptoms please return to the emergency department.    Thank you for choosing [facility]'s Emergency Department. It was a pleasure taking care of you today. If you have any questions, please call [phone].    Sylvia Jung PA-C

## 2023-04-27 NOTE — DISCHARGE INSTRUCTIONS
You received IV Rocephin and Bactrim tonight.  Your white blood count was normal, but your inflammation marker is elevated.  Continue Bactrim DS 1 tablet twice a day for 10 days.  Apply some cool compresses to decrease the swelling of the face.  Follow-up in the clinic in 3-5 days if not improving.  Return to the emergency department at any time if you develop new or worsening symptoms.

## 2023-04-27 NOTE — ED TRIAGE NOTES
Pt presents with facial pain. Pt states was seen in ED for left eye pain. Pt was scratched by cat on Monday nite. Pt was given eye drops. Pt having facial swelling to entire face. Pt states difficulty swallowing. Eyes red.      Triage Assessment     Row Name 04/26/23 2036       Triage Assessment (Adult)    Airway WDL WDL       Respiratory WDL    Respiratory WDL WDL       Skin Circulation/Temperature WDL    Skin Circulation/Temperature WDL WDL       Cardiac WDL    Cardiac WDL WDL       Peripheral/Neurovascular WDL    Peripheral Neurovascular WDL WDL       Cognitive/Neuro/Behavioral WDL    Cognitive/Neuro/Behavioral WDL WDL

## 2023-04-27 NOTE — ED PROVIDER NOTES
[facility] ED Provider Note   Patient: Valarie Talavera  MRN #:  [id number]  Date of Visit: April 26, 2023    CC:     Chief Complaint   Patient presents with     Facial Pain     HPI:  Valarie Talavera is a 32 year old female who presented to the emergency department with facial swelling, with difficulty opening closing her mouth in the setting of recent cat scratch to the left eye 2 nights ago.  Patient was in the emergency department yesterday, and was evaluated.  She was told she did not have an eye injury.  She was started on antibiotic eyedrops.  Overnight, she started develop some swelling over the forehead, face with extension into the left side of her neck.  She has not noticed any discrete lymphadenopathy.  She denies fever or chills.  There are some mild diffuse swelling but no redness, or weeping/drainage.  She has no changes in her voice, and does not have difficulty breathing or swallowing.    Problem List:  Patient Active Problem List    Diagnosis Date Noted     Tenosynovitis, de Quervain (right hand) 06/12/2019     Priority: Medium     Obesity (BMI 35.0-39.9) with comorbidity (H) 03/15/2019     Priority: Medium     Hyperlipidemia LDL goal <160 03/15/2019     Priority: Medium     Bilateral carpal tunnel syndrome 06/15/2018     Priority: Medium     Cubital tunnel syndrome of both upper extremities 06/15/2018     Priority: Medium     Term pregnancy 10/14/2017     Priority: Medium     Gastroesophageal reflux disease with esophagitis 10/04/2017     Priority: Medium     Back pain 08/23/2017     Priority: Medium     Hyperglycemia 07/13/2017     Priority: Medium     Nuchal cord, not applicable or unspecified fetus 06/23/2017     Priority: Medium     Encounter for supervision of other normal pregnancy in third trimester 03/11/2017     Priority: Medium     Vitamin D deficiency 02/02/2016     Priority: Medium     HSIL on Pap smear of cervix  02/02/2016     Priority: Medium     2/2/16: Pap - HSIL. Plan colp  2/11/16 [place].  HSIL. Age 25  Plan: per tele enc on 2/18/16 [place] and cytology in August.  Will have pathologist differentiate between WESLY 2 and 3 if possible.  If lesion is worse, then LEEP.  If it is the same or better, then colp and cytology in 6 months.    8/30/16 HSIL pap/+ HR HPV (not 16 or 18). [place] not completed.  Per Dr. Luna, have pt. Schedule colp bef 11/30/16.  9/9/16 Pt. Advised.  9/22/16 [place]. NO SHOW.  10/6/16 [place] visit. NO SHOW.  1/11/17 [place] not done. Tracking updated for 6 mo colp/pap.   3/10/17 ASCUS pap/+ HR HPV (not 16 or 18). approx 9 weeks pregnant. Plan: colp around 20 weeks gestation, approx 5/26/17  10/14/17 delivered baby. (6 wk pp due 11/25/17)  3/25/19 [place] bx: normal, per provider. Plan: Cotest in 1 due 3/25/20    3/3/21 Reminder letter  4/5/21 Reminder call - spoke to pt.   5/5/21 Lost to follow-up for pap tracking           Past Medical History:   Diagnosis Date     Anxiety      Depression with anxiety      Depressive disorder      HSIL on Pap smear of cervix 2/2/2016     Low blood pressure      Vitamin D deficiency        MEDS: sulfamethoxazole-trimethoprim (BACTRIM DS) 800-160 MG tablet  ciprofloxacin (CILOXAN) 0.3 % ophthalmic solution        ALLERGIES:    Allergies   Allergen Reactions     Zithromax [Azithromycin] Other (See Comments)     hives       Past Surgical History:   Procedure Laterality Date     NO HISTORY OF SURGERY         Social History     Tobacco Use     Smoking status: Former     Types: Cigarettes     Smokeless tobacco: Never     Tobacco comments:     very rare   Substance Use Topics     Alcohol use: No     Alcohol/week: 0.0 standard drinks of alcohol     Drug use: No         Review of Systems   Except as noted in HPI, all other systems were reviewed and are negative    Physical Exam     Vitals were reviewed  Patient Vitals for the past 12 hrs:   BP Temp Pulse Resp SpO2 Weight   04/26/23 2231 112/58  -- 77 20 96 % --   04/26/23 2145 111/59 -- 78 -- 96 % --   04/26/23 2130 107/65 -- 78 -- 97 % --   04/26/23 2115 113/62 -- 79 -- -- --   04/26/23 2102 119/76 -- 82 -- -- --   04/26/23 2035 125/69 98.3  F (36.8  C) 88 16 98 % 113.4 kg (250 lb)     GENERAL APPEARANCE: Alert and oriented x3, no acute respiratory distress  FACE: Very mild swelling of the forehead, and face.  There is no discrete rash on the face  EYES: Pupils are equal; conjunctiva is injected.  There is no drainage  HENT: normal external exam; oral exam is benign  NECK: no adenopathy or asymmetry; patient has some mild tenderness to light palpation.  There is a slight erythematous rash involving the left posterior lateral neck  RESP: normal respiratory effort; clear breath sounds bilaterally  CV: regular rate and rhythm; no significant murmurs, gallops or rubs  ABD: soft, no tenderness; no rebound or guarding; bowel sounds are normal  EXT: No calf tenderness or pitting edema  SKIN: As above        Available Lab/Imaging Results     Results for orders placed or performed during the hospital encounter of 04/26/23 (from the past 24 hour(s))   CBC with platelets differential    Narrative    The following orders were created for panel order CBC with platelets differential.  Procedure                               Abnormality         Status                     ---------                               -----------         ------                     CBC with platelets and d...[[id number]]                      Final result                 Please view results for these tests on the individual orders.   CRP inflammation   Result Value Ref Range    CRP Inflammation 12.88 (H) <5.00 mg/L   Erythrocyte sedimentation rate auto   Result Value Ref Range    Erythrocyte Sedimentation Rate 21 (H) 0 - 20 mm/hr   CBC with platelets and differential   Result Value Ref Range    WBC Count 10.1 4.0 - 11.0 10e3/uL    RBC Count 4.41 3.80 - 5.20 10e6/uL    Hemoglobin 13.1 11.7 - 15.7  g/dL    Hematocrit 39.1 35.0 - 47.0 %    MCV 89 78 - 100 fL    MCH 29.7 26.5 - 33.0 pg    MCHC 33.5 31.5 - 36.5 g/dL    RDW 12.9 10.0 - 15.0 %    Platelet Count 337 150 - 450 10e3/uL    % Neutrophils 60 %    % Lymphocytes 28 %    % Monocytes 10 %    % Eosinophils 2 %    % Basophils 0 %    % Immature Granulocytes 0 %    NRBCs per 100 WBC 0 <1 /100    Absolute Neutrophils 6.1 1.6 - 8.3 10e3/uL    Absolute Lymphocytes 2.8 0.8 - 5.3 10e3/uL    Absolute Monocytes 1.0 0.0 - 1.3 10e3/uL    Absolute Eosinophils 0.2 0.0 - 0.7 10e3/uL    Absolute Basophils 0.0 0.0 - 0.2 10e3/uL    Absolute Immature Granulocytes 0.0 <=0.4 10e3/uL    Absolute NRBCs 0.0 10e3/uL                Impression     Final diagnoses:   Cat scratch of face (eye)   Facial swelling         ED Course & Medical Decision Making   Valarie Talavera is a 32 year old female who presented to the emergency department with facial swelling and discomfort in the setting of recent cat scratch to the left medial eye.  Patient was seen yesterday, started on antibiotic drops.  Patient has been using the ciprofloxacin antibiotic drops which has caused some burning.  Overnight patient has some noticeable facial swelling and pain.  She feels slight tightness over the face and has difficulty with opening closing her mouth.    Vital signs reveal a temp of 98.3, blood pressure 112/58, heart rate of 77, respiratory of 20 with 96% oxygen saturation.  Exam is as noted above.  Skin is intact.  There is no weeping or drainage.  There is only a slight area of redness along the left lateral neck.  No significant adenopathy although she experienced some tenderness over the anterior and lymph nodes.  She has conjunctival injection but no mattering or discharge.    Patient's symptoms may be due to early cellulitis from her cat scratch.  Laboratory work-up reveals a normal white blood count of 10.1, and hemoglobin of 13.1, platelet count of 337 with normal differential.  CRP is elevated at  12.88, sed rate is 21.  Patient was given Rocephin 1 g IV, Bactrim DS while in the ED.    Medications   cefTRIAXone (ROCEPHIN) 1 g vial to attach to  mL bag for ADULTS or NS 50 mL bag for PEDS (0 g Intravenous Stopped 4/26/23 2152)   sulfamethoxazole-trimethoprim (BACTRIM DS) 800-160 MG per tablet 1 tablet (1 tablet Oral $Given 4/26/23 2116)        Continue treatment for possible cat scratch disease with Bactrim DS 1 tablet twice a day for 10 days.  Unfortunately patient reports an allergy to Zithromax.           Written after-visit summary and instructions were given at the time of discharge.    Follow up Plan:   Luis Crisostomo MD  [address] [place] [id number]  [phone]    In 3 days  if not improving    [facility] Emergency Dept  [address] [place] [address] [phone]    If symptoms worsen      Discharge Instructions:   You received IV Rocephin and Bactrim tonight.  Your white blood count was normal, but your inflammation marker is elevated.  Continue Bactrim DS 1 tablet twice a day for 10 days.  Apply some cool compresses to decrease the swelling of the face.  Follow-up in the clinic in 3-5 days if not improving.  Return to the emergency department at any time if you develop new or worsening symptoms.       Disclaimer: This note consists of words and symbols derived from keyboarding and dictation using voice recognition software.  As a result, there may be errors that have gone undetected.  Please consider this when interpreting information found in this note.       Harini Alexander MD  04/27/23 4709

## 2024-02-08 ENCOUNTER — OFFICE VISIT (OUTPATIENT)
Dept: INTERNAL MEDICINE | Facility: CLINIC | Age: 34
End: 2024-02-08
Payer: COMMERCIAL

## 2024-02-08 VITALS
TEMPERATURE: 97.2 F | HEART RATE: 79 BPM | HEIGHT: 67 IN | DIASTOLIC BLOOD PRESSURE: 72 MMHG | RESPIRATION RATE: 18 BRPM | SYSTOLIC BLOOD PRESSURE: 114 MMHG | WEIGHT: 264.25 LBS | OXYGEN SATURATION: 98 % | BODY MASS INDEX: 41.47 KG/M2

## 2024-02-08 DIAGNOSIS — E78.5 HYPERLIPIDEMIA LDL GOAL <130: ICD-10-CM

## 2024-02-08 DIAGNOSIS — E28.2 PCOS (POLYCYSTIC OVARIAN SYNDROME): ICD-10-CM

## 2024-02-08 DIAGNOSIS — E66.01 MORBID OBESITY (H): Primary | ICD-10-CM

## 2024-02-08 DIAGNOSIS — E78.5 HYPERLIPIDEMIA LDL GOAL <160: ICD-10-CM

## 2024-02-08 DIAGNOSIS — F33.1 MODERATE EPISODE OF RECURRENT MAJOR DEPRESSIVE DISORDER (H): ICD-10-CM

## 2024-02-08 LAB
ALBUMIN SERPL BCG-MCNC: 4.2 G/DL (ref 3.5–5.2)
ALP SERPL-CCNC: 69 U/L (ref 40–150)
ALT SERPL W P-5'-P-CCNC: 22 U/L (ref 0–50)
ANION GAP SERPL CALCULATED.3IONS-SCNC: 12 MMOL/L (ref 7–15)
AST SERPL W P-5'-P-CCNC: 16 U/L (ref 0–45)
BILIRUB SERPL-MCNC: 0.3 MG/DL
BUN SERPL-MCNC: 14.6 MG/DL (ref 6–20)
CALCIUM SERPL-MCNC: 9.1 MG/DL (ref 8.6–10)
CHLORIDE SERPL-SCNC: 105 MMOL/L (ref 98–107)
CHOLEST SERPL-MCNC: 198 MG/DL
CREAT SERPL-MCNC: 0.67 MG/DL (ref 0.51–0.95)
DEPRECATED HCO3 PLAS-SCNC: 24 MMOL/L (ref 22–29)
EGFRCR SERPLBLD CKD-EPI 2021: >90 ML/MIN/1.73M2
ERYTHROCYTE [DISTWIDTH] IN BLOOD BY AUTOMATED COUNT: 13 % (ref 10–15)
ESTRADIOL SERPL-MCNC: 24 PG/ML
FASTING STATUS PATIENT QL REPORTED: NO
FSH SERPL IRP2-ACNC: 6.5 MIU/ML
GLUCOSE SERPL-MCNC: 92 MG/DL (ref 70–99)
HBA1C MFR BLD: 5.2 %
HCT VFR BLD AUTO: 39.9 % (ref 35–47)
HDLC SERPL-MCNC: 44 MG/DL
HGB BLD-MCNC: 13.4 G/DL (ref 11.7–15.7)
LDLC SERPL CALC-MCNC: 124 MG/DL
LH SERPL-ACNC: 5.2 MIU/ML
MCH RBC QN AUTO: 29.4 PG (ref 26.5–33)
MCHC RBC AUTO-ENTMCNC: 33.6 G/DL (ref 31.5–36.5)
MCV RBC AUTO: 88 FL (ref 78–100)
NONHDLC SERPL-MCNC: 154 MG/DL
PLATELET # BLD AUTO: 299 10E3/UL (ref 150–450)
POTASSIUM SERPL-SCNC: 3.8 MMOL/L (ref 3.4–5.3)
PROT SERPL-MCNC: 7.6 G/DL (ref 6.4–8.3)
RBC # BLD AUTO: 4.56 10E6/UL (ref 3.8–5.2)
SODIUM SERPL-SCNC: 141 MMOL/L (ref 135–145)
TRIGL SERPL-MCNC: 149 MG/DL
TSH SERPL DL<=0.005 MIU/L-ACNC: 1.01 UIU/ML (ref 0.3–4.2)
WBC # BLD AUTO: 6.1 10E3/UL (ref 4–11)

## 2024-02-08 PROCEDURE — 84403 ASSAY OF TOTAL TESTOSTERONE: CPT | Performed by: INTERNAL MEDICINE

## 2024-02-08 PROCEDURE — 85027 COMPLETE CBC AUTOMATED: CPT | Performed by: INTERNAL MEDICINE

## 2024-02-08 PROCEDURE — 99204 OFFICE O/P NEW MOD 45 MIN: CPT | Performed by: INTERNAL MEDICINE

## 2024-02-08 PROCEDURE — 83001 ASSAY OF GONADOTROPIN (FSH): CPT | Performed by: INTERNAL MEDICINE

## 2024-02-08 PROCEDURE — 80061 LIPID PANEL: CPT | Performed by: INTERNAL MEDICINE

## 2024-02-08 PROCEDURE — 83036 HEMOGLOBIN GLYCOSYLATED A1C: CPT | Performed by: INTERNAL MEDICINE

## 2024-02-08 PROCEDURE — 82670 ASSAY OF TOTAL ESTRADIOL: CPT | Performed by: INTERNAL MEDICINE

## 2024-02-08 PROCEDURE — 83002 ASSAY OF GONADOTROPIN (LH): CPT | Performed by: INTERNAL MEDICINE

## 2024-02-08 PROCEDURE — 80053 COMPREHEN METABOLIC PANEL: CPT | Performed by: INTERNAL MEDICINE

## 2024-02-08 PROCEDURE — 84443 ASSAY THYROID STIM HORMONE: CPT | Performed by: INTERNAL MEDICINE

## 2024-02-08 PROCEDURE — 36415 COLL VENOUS BLD VENIPUNCTURE: CPT | Performed by: INTERNAL MEDICINE

## 2024-02-08 RX ORDER — BUPROPION HYDROCHLORIDE 150 MG/1
150 TABLET ORAL EVERY MORNING
Qty: 30 TABLET | Refills: 0 | Status: SHIPPED | OUTPATIENT
Start: 2024-02-08 | End: 2024-02-19

## 2024-02-08 ASSESSMENT — PAIN SCALES - GENERAL: PAINLEVEL: NO PAIN (0)

## 2024-02-08 ASSESSMENT — PATIENT HEALTH QUESTIONNAIRE - PHQ9
10. IF YOU CHECKED OFF ANY PROBLEMS, HOW DIFFICULT HAVE THESE PROBLEMS MADE IT FOR YOU TO DO YOUR WORK, TAKE CARE OF THINGS AT HOME, OR GET ALONG WITH OTHER PEOPLE: VERY DIFFICULT
SUM OF ALL RESPONSES TO PHQ QUESTIONS 1-9: 18
SUM OF ALL RESPONSES TO PHQ QUESTIONS 1-9: 18

## 2024-02-08 NOTE — PROGRESS NOTES
Subjective   Valarie is a 33 year old, presenting for the following health issues:  Weight Loss (Pt reports struggling to lose weight)      2/8/2024     8:23 AM   Additional Questions   Roomed by Jaz     History of Present Illness       Reason for visit:  Trouble loosing weight, depression, trouble sleeping    She eats 2-3 servings of fruits and vegetables daily.She consumes 0 sweetened beverage(s) daily.She exercises with enough effort to increase her heart rate 9 or less minutes per day.  She exercises with enough effort to increase her heart rate 3 or less days per week.   She is taking medications regularly.           EMR reviewed including:             Complaint, History of Chief Complaint, Corresponding Review of Systems, and Complaint Specific Physical Examination.    #1   Morbid obesity  BMI 41.48  Unable to lose weight on her own.  Wants something done.  Complains of depression secondary to obesity.  Notes her quality of life is significantly compromised.  This is due to her obesity.  Has tried many over-the-counter/diets in the past.  Has history of PCOS.          Exam:   Constitutional: The patient appears to be in no acute distress. The patient appears to be adequately hydrated. No acute respiratory or hemodynamic distress is noted at this time.   LUNGS: clear bilaterally, airflow is brisk, no intercostal retraction or stridor is noted. No coughing is noted during visit.   HEART:  regular without rubs, clicks, gallops, or murmurs. PMI is nondisplaced. Upstrokes are brisk. S1,S2 are heard.   GI: Abdomen is soft, without rebound, guarding or tenderness. Bowel sounds are appropriate. No renal bruits are heard.   NEURO: Pt is alert and appropriate. No neurologic lateralization is noted. Cranial nerves 2-12 are intact. Peripheral sensory and motor function are grossly normal.       #2   Moderate depression  Loss of interest in her normal activities.  Constant frustration with her coworkers and  "children  Sleeping poorly.  Ruminates about the events of the previous and upcoming days.  Denies any suicidal ideation or intent.          Exam:   PSYCH: The patient appears grossly appropriate. Maintains good eye contact, does not have any jittery or atypical motion. Displays appropriate affect.      #3   History of polycystic ovarian syndrome.  Complains of worsening hirsutism.  Notes some menstrual irregularity.  He is due for gynecologic examination.  Wishes to establish with gynecologist.        Patient has been interviewed, applicable history and applied review of systems have been performed.    Vital Signs:   /72   Pulse 79   Temp 97.2  F (36.2  C) (Temporal)   Resp 18   Ht 1.7 m (5' 6.93\")   Wt 119.9 kg (264 lb 4 oz)   LMP 02/06/2024 (Exact Date)   SpO2 98%   BMI 41.48 kg/m        Decision Making    Problem and Complexity     1. Morbid obesity (H)  Check thyroid.  Will set up with bariatrics team.  Explained that a simple pill/shot is generally ineffective and that she needs a \"program\".  - Adult Comprehensive Weight Management  Referral; Future  - TSH with free T4 reflex; Future  - TSH with free T4 reflex    2. Moderate episode of recurrent major depressive disorder (H)  Will start Wellbutrin for the time being.  Patient refuses counseling.  \"I can do this on my own\"  - buPROPion (WELLBUTRIN XL) 150 MG 24 hr tablet; Take 1 tablet (150 mg) by mouth every morning  Dispense: 30 tablet; Refill: 0    3. PCOS (polycystic ovarian syndrome)  Will check hormonal levels.  Rule out concurrent diabetes.  Gynecologic consultation requested.  - CBC with platelets; Future  - Comprehensive metabolic panel (BMP + Alb, Alk Phos, ALT, AST, Total. Bili, TP); Future  - Hemoglobin A1c; Future  - Follicle stimulating hormone; Future  - Luteinizing Hormone; Future  - Testosterone, total; Future  - Estradiol; Future  - Ob/Gyn  Referral; Future  - CBC with platelets  - Comprehensive metabolic panel " (BMP + Alb, Alk Phos, ALT, AST, Total. Bili, TP)  - Hemoglobin A1c  - Follicle stimulating hormone  - Luteinizing Hormone  - Testosterone, total  - Estradiol        5. Hyperlipidemia LDL goal <130  Check lipids  - Lipid panel reflex to direct LDL Fasting; Future  - Lipid panel reflex to direct LDL Fasting                                FOLLOW UP   I have asked the patient to make an appointment for followup with her primary care provider, bariatric specialist, gynecologist as scheduled.    Regarding routine vaccinations:  I have reviewed the patient's vaccination schedule and discussed the benefits of prophylactic vaccination in detail.  I recommend the patient contact their pharmacist for vaccinations.  Discussed that most insurance companies now favor reimbursement to the pharmacies and it will financially behoove the patient to have vaccinations performed at their pharmacy.        I have carefully explained the diagnosis and treatment options to the patient.  The patient has displayed an understanding of the above, and all subsequent questions were answered.      DO TOMMIE Miguel    Portions of this note were produced using [employer]  Although every attempt at real-time proof reading has been made, occasional grammar/syntax errors may have been missed.

## 2024-02-10 LAB — TESTOST SERPL-MCNC: 15 NG/DL (ref 8–60)

## 2024-02-19 ENCOUNTER — OFFICE VISIT (OUTPATIENT)
Dept: FAMILY MEDICINE | Facility: CLINIC | Age: 34
End: 2024-02-19
Payer: COMMERCIAL

## 2024-02-19 VITALS
DIASTOLIC BLOOD PRESSURE: 72 MMHG | RESPIRATION RATE: 16 BRPM | BODY MASS INDEX: 41.44 KG/M2 | TEMPERATURE: 97.8 F | HEART RATE: 86 BPM | HEIGHT: 67 IN | OXYGEN SATURATION: 98 % | SYSTOLIC BLOOD PRESSURE: 108 MMHG | WEIGHT: 264 LBS

## 2024-02-19 DIAGNOSIS — F33.1 MODERATE EPISODE OF RECURRENT MAJOR DEPRESSIVE DISORDER (H): ICD-10-CM

## 2024-02-19 DIAGNOSIS — Z12.4 CERVICAL CANCER SCREENING: ICD-10-CM

## 2024-02-19 DIAGNOSIS — E28.2 PCOS (POLYCYSTIC OVARIAN SYNDROME): ICD-10-CM

## 2024-02-19 DIAGNOSIS — E66.813 CLASS 3 SEVERE OBESITY DUE TO EXCESS CALORIES WITHOUT SERIOUS COMORBIDITY WITH BODY MASS INDEX (BMI) OF 40.0 TO 44.9 IN ADULT (H): Primary | ICD-10-CM

## 2024-02-19 DIAGNOSIS — L68.0 HIRSUTISM: ICD-10-CM

## 2024-02-19 DIAGNOSIS — E66.01 CLASS 3 SEVERE OBESITY DUE TO EXCESS CALORIES WITHOUT SERIOUS COMORBIDITY WITH BODY MASS INDEX (BMI) OF 40.0 TO 44.9 IN ADULT (H): Primary | ICD-10-CM

## 2024-02-19 PROCEDURE — 99214 OFFICE O/P EST MOD 30 MIN: CPT | Performed by: FAMILY MEDICINE

## 2024-02-19 RX ORDER — BUPROPION HYDROCHLORIDE 150 MG/1
150 TABLET ORAL EVERY MORNING
Qty: 30 TABLET | Refills: 0 | Status: SHIPPED | OUTPATIENT
Start: 2024-02-19 | End: 2024-03-13

## 2024-02-19 RX ORDER — METFORMIN HCL 500 MG
TABLET, EXTENDED RELEASE 24 HR ORAL
Qty: 60 TABLET | Refills: 3 | Status: SHIPPED | OUTPATIENT
Start: 2024-02-19 | End: 2024-05-26

## 2024-02-19 NOTE — PATIENT INSTRUCTIONS
Let's cautiously metformin to help with your symptoms.  Increase dose as tolerated until taking 2 times/day.      If you'd like to try spironolactone for your hirsutism, let us know.      Work on lifestyle changes.  OK to see the weight loss clinic.      Follow up on mood and for Pap in about a month.      Contact us or return if questions or concerns.    Have a nice day!    Dr. Crisostomo

## 2024-02-19 NOTE — PROGRESS NOTES
Assessment & Plan       ICD-10-CM    1. Class 3 severe obesity due to excess calories without serious comorbidity with body mass index (BMI) of 40.0 to 44.9 in adult (H)  E66.01 buPROPion (WELLBUTRIN XL) 150 MG 24 hr tablet    Z68.41 metFORMIN (GLUCOPHAGE XR) 500 MG 24 hr tablet      2. Moderate episode of recurrent major depressive disorder (H)  F33.1 buPROPion (WELLBUTRIN XL) 150 MG 24 hr tablet      3. Hirsutism  L68.0 metFORMIN (GLUCOPHAGE XR) 500 MG 24 hr tablet      4. PCOS (polycystic ovarian syndrome)  E28.2 metFORMIN (GLUCOPHAGE XR) 500 MG 24 hr tablet      5. Cervical cancer screening  Z12.4            1.  Discussed that long-term results are most easily achieved when making consistent and livable lifestyle modifications.  Discussed various options for managing this.  Also discussed medications that can provide some assistance.  Included FDA approved and not FDA approved medications.  Patient is responding well to Wellbutrin, so we will continue this for now.  She was assisted in trying metformin as well.  Can consider GLP-1 agonist or SGLT2 antagonist or Topamax in the future if patient desires.  Encourage patient to follow-up with weight management clinic as referred.  2.  Clinically appears to be responding.  Will continue on current dosing and reassess in 1 to 2 months.  Consider further increase in dose if needed.  3.  Discussed options for managing this medically as well as cosmetically.  Patient will see if metformin provides any hormonal benefits.  She did not want to start spironolactone at this time.  4.  Patient does not meet the strict criteria for this given her relatively regular periods prior to delivery of her last child.  That being said, she could have a low-grade form related to obesity.  Will see if metformin provides any benefit.  I did discuss with patient the risk of increased fertility while on metformin.  Patient expressed understanding and wished to proceed with this.  5.   "Patient is overdue for Pap testing.  I encouraged her to get this done as soon as possible.  Scheduled follow-up in 1 to 2 months.  She did not wish to do this today.        Portions of this note were completed using Dragon dictation software.  Although reviewed, there may be typographical and other inadvertent errors that remain.       Ordering of each unique test  Prescription drug management        BMI  Estimated body mass index is 41.66 kg/m  as calculated from the following:    Height as of this encounter: 1.695 m (5' 6.75\").    Weight as of this encounter: 119.7 kg (264 lb).   Weight management plan: Discussed healthy diet and exercise guidelines      Patient Instructions   Let's cautiously metformin to help with your symptoms.  Increase dose as tolerated until taking 2 times/day.      If you'd like to try spironolactone for your hirsutism, let us know.      Work on lifestyle changes.  OK to see the weight loss clinic.      Follow up on mood and for Pap in about a month.      Contact us or return if questions or concerns.    Have a nice day!    Dr. Crisostomo    Izabella Sheppard is a 33 year old, presenting for the following health issues:  Weight Problem, Sleep Problem, and Depression      2/19/2024     2:48 PM   Additional Questions   Roomed by Sydnie DOMINGUEZ     Pt has longstanding struggle with depression since her last delivery in 2017.  She is doing well since starting Wellbutrin a few weeks ago.  Felt a little \"high\" the first few days, but was a bit spacy.  Overall, feels like things are going well now.      She struggles with weight.  Highest weight was 280 lbs.  She was referred to a weight loss group. But uncertain about insurance coverage.      Having more possible PCOS symptoms, more hirsutism, etc.  Hasn't ever formally had a diagnosis of PCOS.  Since 2017, her periods haven't been regular. Prior to that, had regular, but painful periods.          7/13/2017     1:06 PM 9/20/2017     1:45 PM 2/8/2024 " "    8:18 AM   PHQ   PHQ-9 Total Score 4 6 18   Q9: Thoughts of better off dead/self-harm past 2 weeks Not at all Not at all Not at all                 Objective    /72   Pulse 86   Temp 97.8  F (36.6  C) (Temporal)   Resp 16   Ht 1.695 m (5' 6.75\")   Wt 119.7 kg (264 lb)   LMP 02/06/2024 (Exact Date)   SpO2 98%   BMI 41.66 kg/m    Body mass index is 41.66 kg/m .  Physical Exam   GENERAL: alert and no distress  NECK: no adenopathy, no asymmetry, masses, or scars  RESP: lungs clear to auscultation - no rales, rhonchi or wheezes  CV: regular rate and rhythm, normal S1 S2, no S3 or S4, no murmur, click or rub, no peripheral edema  ABDOMEN: soft, nontender, no hepatosplenomegaly, no masses and bowel sounds normal  MS: no gross musculoskeletal defects noted, no edema  SKIN: hirsutism on face.    Office Visit on 02/08/2024   Component Date Value Ref Range Status    WBC Count 02/08/2024 6.1  4.0 - 11.0 10e3/uL Final    RBC Count 02/08/2024 4.56  3.80 - 5.20 10e6/uL Final    Hemoglobin 02/08/2024 13.4  11.7 - 15.7 g/dL Final    Hematocrit 02/08/2024 39.9  35.0 - 47.0 % Final    MCV 02/08/2024 88  78 - 100 fL Final    MCH 02/08/2024 29.4  26.5 - 33.0 pg Final    MCHC 02/08/2024 33.6  31.5 - 36.5 g/dL Final    RDW 02/08/2024 13.0  10.0 - 15.0 % Final    Platelet Count 02/08/2024 299  150 - 450 10e3/uL Final    Sodium 02/08/2024 141  135 - 145 mmol/L Final    Reference intervals for this test were updated on 09/26/2023 to more accurately reflect our healthy population. There may be differences in the flagging of prior results with similar values performed with this method. Interpretation of those prior results can be made in the context of the updated reference intervals.     Potassium 02/08/2024 3.8  3.4 - 5.3 mmol/L Final    Carbon Dioxide (CO2) 02/08/2024 24  22 - 29 mmol/L Final    Anion Gap 02/08/2024 12  7 - 15 mmol/L Final    Urea Nitrogen 02/08/2024 14.6  6.0 - 20.0 mg/dL Final    Creatinine 02/08/2024 " 0.67  0.51 - 0.95 mg/dL Final    GFR Estimate 02/08/2024 >90  >60 mL/min/1.73m2 Final    Calcium 02/08/2024 9.1  8.6 - 10.0 mg/dL Final    Chloride 02/08/2024 105  98 - 107 mmol/L Final    Glucose 02/08/2024 92  70 - 99 mg/dL Final    Alkaline Phosphatase 02/08/2024 69  40 - 150 U/L Final    Reference intervals for this test were updated on 11/14/2023 to more accurately reflect our healthy population. There may be differences in the flagging of prior results with similar values performed with this method. Interpretation of those prior results can be made in the context of the updated reference intervals.    AST 02/08/2024 16  0 - 45 U/L Final    Reference intervals for this test were updated on 6/12/2023 to more accurately reflect our healthy population. There may be differences in the flagging of prior results with similar values performed with this method. Interpretation of those prior results can be made in the context of the updated reference intervals.    ALT 02/08/2024 22  0 - 50 U/L Final    Reference intervals for this test were updated on 6/12/2023 to more accurately reflect our healthy population. There may be differences in the flagging of prior results with similar values performed with this method. Interpretation of those prior results can be made in the context of the updated reference intervals.      Protein Total 02/08/2024 7.6  6.4 - 8.3 g/dL Final    Albumin 02/08/2024 4.2  3.5 - 5.2 g/dL Final    Bilirubin Total 02/08/2024 0.3  <=1.2 mg/dL Final    Hemoglobin A1C 02/08/2024 5.2  <5.7 % Final    Normal <5.7%   Prediabetes 5.7-6.4%    Diabetes 6.5% or higher     Note: Adopted from ADA consensus guidelines.    Cholesterol 02/08/2024 198  <200 mg/dL Final    Triglycerides 02/08/2024 149  <150 mg/dL Final    Direct Measure HDL 02/08/2024 44 (L)  >=50 mg/dL Final    LDL Cholesterol Calculated 02/08/2024 124 (H)  <=100 mg/dL Final    Non HDL Cholesterol 02/08/2024 154 (H)  <130 mg/dL Final    Patient  Fasting > 8hrs? 02/08/2024 No   Final    FSH 02/08/2024 6.5  mIU/mL Final    19 years and older:   Follicular phase: 3.5-12.5 mIU/mL   Ovulation phase: 4.7-21.5 mIU/mL   Luteal phase: 1.7-7.7 mIU/mL   Postmenopause: 25.8-134.8 mIU/mL       Luteinizing Hormone 02/08/2024 5.2  mIU/mL Final    FEMALE:  Age  0 - 6 mo:  <0.1-8.2 mIU/mL  6 mo - 11 years: <0.1-1.3 mIU/mL   11 - 14 years: <0.1-10 mIU/mL   14 - 19 years: 0.4-25 mIU/mL     19 years and older:   Follicular Phase: 2.4-12.6 mIU/mL  Ovulation Phase: 14.0-95.6 mIU/mL  Luteal Phase: 1.0-11.4  mIU/mL  Postmenopausal: 7.7-58.5 mIU/mL      TSH 02/08/2024 1.01  0.30 - 4.20 uIU/mL Final    Testosterone Total 02/08/2024 15  8 - 60 ng/dL Final    Estradiol 02/08/2024 24  pg/mL Final    Healthy Men:   11.3-43.2 pg/mL    Healthy Postmenopausal Women:  Postmenopause: <5-138 pg/mL    Healthy Pregnant Women:  1st trimester: 154-3243 pg/mL  2nd trimester: 1561-71729 pg/mL  3rd trimester: 8525->96517 pg/mL    Healthy Women Cycle Phase:  Follicular: 30.9-90.4 pg/mL  Ovulation: 60.4-533 pg/mL  Luteal: 60.4-232 pg/mL    Healthy Women Cycle Sub-Phase:  Early Follicular: 20.5-62.8 pg/mL  Intermediate Follicular: 26-79.8 pg/mL  Late Follicular: 49.5-233 pg/mL  Ovulation: 60.4-602 pg/mL  Early Luteal: 51.1-179 pg/mL  Intermediate Luteal: 66.5-305 pg/mL  Late Luteal: 30.2-222 pg/mL     No results found for any visits on 02/19/24.  No results found for this or any previous visit (from the past 24 hour(s)).        Signed Electronically by: Luis Crisostomo MD, MD

## 2024-03-13 ENCOUNTER — MYC REFILL (OUTPATIENT)
Dept: FAMILY MEDICINE | Facility: CLINIC | Age: 34
End: 2024-03-13
Payer: COMMERCIAL

## 2024-03-13 DIAGNOSIS — F33.1 MODERATE EPISODE OF RECURRENT MAJOR DEPRESSIVE DISORDER (H): ICD-10-CM

## 2024-03-13 DIAGNOSIS — E66.813 CLASS 3 SEVERE OBESITY DUE TO EXCESS CALORIES WITHOUT SERIOUS COMORBIDITY WITH BODY MASS INDEX (BMI) OF 40.0 TO 44.9 IN ADULT (H): ICD-10-CM

## 2024-03-13 DIAGNOSIS — E66.01 CLASS 3 SEVERE OBESITY DUE TO EXCESS CALORIES WITHOUT SERIOUS COMORBIDITY WITH BODY MASS INDEX (BMI) OF 40.0 TO 44.9 IN ADULT (H): ICD-10-CM

## 2024-03-15 RX ORDER — BUPROPION HYDROCHLORIDE 150 MG/1
150 TABLET ORAL EVERY MORNING
Qty: 30 TABLET | Refills: 0 | Status: SHIPPED | OUTPATIENT
Start: 2024-03-15

## 2024-06-22 ENCOUNTER — HEALTH MAINTENANCE LETTER (OUTPATIENT)
Age: 34
End: 2024-06-22

## 2024-11-30 ENCOUNTER — HOSPITAL ENCOUNTER (EMERGENCY)
Facility: CLINIC | Age: 34
Discharge: HOME | End: 2024-11-30
Attending: STUDENT IN AN ORGANIZED HEALTH CARE EDUCATION/TRAINING PROGRAM | Admitting: STUDENT IN AN ORGANIZED HEALTH CARE EDUCATION/TRAINING PROGRAM
Payer: COMMERCIAL

## 2024-11-30 VITALS
HEART RATE: 93 BPM | SYSTOLIC BLOOD PRESSURE: 117 MMHG | OXYGEN SATURATION: 95 % | RESPIRATION RATE: 18 BRPM | TEMPERATURE: 99.3 F | HEIGHT: 68 IN | DIASTOLIC BLOOD PRESSURE: 74 MMHG | BODY MASS INDEX: 40.14 KG/M2

## 2024-11-30 DIAGNOSIS — B34.9 ACUTE VIRAL SYNDROME: ICD-10-CM

## 2024-11-30 DIAGNOSIS — R50.9 FEVER IN ADULT: ICD-10-CM

## 2024-11-30 LAB
FLUAV RNA SPEC QL NAA+PROBE: NEGATIVE
FLUBV RNA RESP QL NAA+PROBE: NEGATIVE
RSV RNA SPEC NAA+PROBE: NEGATIVE
SARS-COV-2 RNA RESP QL NAA+PROBE: NEGATIVE

## 2024-11-30 PROCEDURE — 99284 EMERGENCY DEPT VISIT MOD MDM: CPT | Performed by: STUDENT IN AN ORGANIZED HEALTH CARE EDUCATION/TRAINING PROGRAM

## 2024-11-30 PROCEDURE — 250N000011 HC RX IP 250 OP 636: Performed by: STUDENT IN AN ORGANIZED HEALTH CARE EDUCATION/TRAINING PROGRAM

## 2024-11-30 PROCEDURE — 96372 THER/PROPH/DIAG INJ SC/IM: CPT | Performed by: STUDENT IN AN ORGANIZED HEALTH CARE EDUCATION/TRAINING PROGRAM

## 2024-11-30 PROCEDURE — 250N000013 HC RX MED GY IP 250 OP 250 PS 637: Performed by: STUDENT IN AN ORGANIZED HEALTH CARE EDUCATION/TRAINING PROGRAM

## 2024-11-30 PROCEDURE — 87637 SARSCOV2&INF A&B&RSV AMP PRB: CPT | Performed by: STUDENT IN AN ORGANIZED HEALTH CARE EDUCATION/TRAINING PROGRAM

## 2024-11-30 RX ORDER — OXYCODONE HYDROCHLORIDE 5 MG/1
5 TABLET ORAL EVERY 6 HOURS PRN
Qty: 6 TABLET | Refills: 0 | Status: SHIPPED | OUTPATIENT
Start: 2024-11-30

## 2024-11-30 RX ORDER — KETOROLAC TROMETHAMINE 30 MG/ML
30 INJECTION, SOLUTION INTRAMUSCULAR; INTRAVENOUS ONCE
Status: COMPLETED | OUTPATIENT
Start: 2024-11-30 | End: 2024-11-30

## 2024-11-30 RX ORDER — OXYCODONE HYDROCHLORIDE 5 MG/1
5 TABLET ORAL ONCE
Status: COMPLETED | OUTPATIENT
Start: 2024-11-30 | End: 2024-11-30

## 2024-11-30 RX ADMIN — KETOROLAC TROMETHAMINE 30 MG: 30 INJECTION, SOLUTION INTRAMUSCULAR at 17:14

## 2024-11-30 RX ADMIN — OXYCODONE HYDROCHLORIDE 5 MG: 5 TABLET ORAL at 17:16

## 2024-11-30 ASSESSMENT — ACTIVITIES OF DAILY LIVING (ADL)
ADLS_ACUITY_SCORE: 41
ADLS_ACUITY_SCORE: 41

## 2024-11-30 ASSESSMENT — COLUMBIA-SUICIDE SEVERITY RATING SCALE - C-SSRS
6. HAVE YOU EVER DONE ANYTHING, STARTED TO DO ANYTHING, OR PREPARED TO DO ANYTHING TO END YOUR LIFE?: NO
1. IN THE PAST MONTH, HAVE YOU WISHED YOU WERE DEAD OR WISHED YOU COULD GO TO SLEEP AND NOT WAKE UP?: NO
2. HAVE YOU ACTUALLY HAD ANY THOUGHTS OF KILLING YOURSELF IN THE PAST MONTH?: NO

## 2024-11-30 NOTE — ED TRIAGE NOTES
Pt reports being sick for the past 4 days with a fever, headache and body aches.      Triage Assessment (Adult)       Row Name 11/30/24 8346          Triage Assessment    Airway WDL WDL        Respiratory WDL    Respiratory WDL X

## 2024-11-30 NOTE — Clinical Note
Valarie Talavera was seen and treated in our emergency department on 11/30/2024.  She may return to work on 12/03/2024.       If you have any questions or concerns, please don't hesitate to call.      Zaire Holden MD

## 2024-11-30 NOTE — ED PROVIDER NOTES
History     Chief Complaint   Patient presents with    Fever     HPI  Valarie Talavera is a 34 year old female with history of depression, obesity who presents for evaluation of a headache, fever, body aches.  Symptoms have been present for the past 4 days.  Headache did not start suddenly and she states it is mostly localized to the base of her occiput.  Tylenol/Motrin and even tramadol have not helped with the body aches.  No known sick contacts.  Aside from a nonproductive cough in the past few days, patient denies neck stiffness, photophobia, chest pain, abdominal pain, vomiting, changes in bowel or urinary habits, other complaints today.    Allergies:  Allergies   Allergen Reactions    Other Drug Allergy (See Comments) Hives     Essential Oils    Zithromax [Azithromycin] Other (See Comments)     hives     Problem List:    Patient Active Problem List    Diagnosis Date Noted    Hirsutism 02/19/2024     Priority: Medium    Moderate episode of recurrent major depressive disorder (H) 02/19/2024     Priority: Medium    Tenosynovitis, de Quervain (right hand) 06/12/2019     Priority: Medium    Class 3 severe obesity due to excess calories without serious comorbidity with body mass index (BMI) of 40.0 to 44.9 in adult (H) 03/15/2019     Priority: Medium    Hyperlipidemia LDL goal <160 03/15/2019     Priority: Medium    Bilateral carpal tunnel syndrome 06/15/2018     Priority: Medium    Cubital tunnel syndrome of both upper extremities 06/15/2018     Priority: Medium    Term pregnancy 10/14/2017     Priority: Medium    Gastroesophageal reflux disease with esophagitis 10/04/2017     Priority: Medium    Back pain 08/23/2017     Priority: Medium    Hyperglycemia 07/13/2017     Priority: Medium    Nuchal cord, not applicable or unspecified fetus 06/23/2017     Priority: Medium    Encounter for supervision of other normal pregnancy in third trimester 03/11/2017     Priority: Medium    Vitamin D deficiency 02/02/2016      Priority: Medium    HSIL on Pap smear of cervix 02/02/2016     Priority: Medium     2/2/16: Pap - HSIL. Plan colp  2/11/16 [place].  HSIL. Age 25  Plan: per tele enc on 2/18/16 [place] and cytology in August.  Will have pathologist differentiate between WESLY 2 and 3 if possible.  If lesion is worse, then LEEP.  If it is the same or better, then colp and cytology in 6 months.    8/30/16 HSIL pap/+ HR HPV (not 16 or 18). [place] not completed.  Per Dr. Luna, have pt. Schedule colp bef 11/30/16.  9/9/16 Pt. Advised.  9/22/16 [place]. NO SHOW.  10/6/16 [place] visit. NO SHOW.  1/11/17 [place] not done. Tracking updated for 6 mo colp/pap.   3/10/17 ASCUS pap/+ HR HPV (not 16 or 18). approx 9 weeks pregnant. Plan: colp around 20 weeks gestation, approx 5/26/17  10/14/17 delivered baby. (6 wk pp due 11/25/17)  3/25/19 [place] bx: normal, per provider. Plan: [facility] in 1 due 3/25/20    3/3/21 Reminder letter  4/5/21 Reminder call - spoke to pt.   5/5/21 Lost to follow-up for pap tracking          Past Medical History:    Past Medical History:   Diagnosis Date    Anxiety     Depression with anxiety     Depressive disorder     HSIL on Pap smear of cervix 2/2/2016    Low blood pressure     Vitamin D deficiency      Past Surgical History:    Past Surgical History:   Procedure Laterality Date    NO HISTORY OF SURGERY       Family History:    Family History   Problem Relation Age of Onset    Hypertension No family hx of     Cerebrovascular Disease No family hx of     Hyperlipidemia No family hx of      Social History:  Marital Status:  Single [1]  Social History     Tobacco Use    Smoking status: Former     Types: Cigarettes    Smokeless tobacco: Never    Tobacco comments:     very rare   Substance Use Topics    Alcohol use: No     Alcohol/week: 0.0 standard drinks of alcohol    Drug use: No      Medications:    oxyCODONE (ROXICODONE) 5 MG tablet      Review of Systems   All other systems reviewed and are negative.  See HPI.    Physical Exam   BP:  "129/71  Pulse: 112  Temp: (!) 102.9  F (39.4  C)  Resp: 20  Height: 172.7 cm (5' 8\")  SpO2: 96 %    Physical Exam  Vitals and nursing note reviewed.   Constitutional:       General: She is in acute distress.      Appearance: Normal appearance. She is not diaphoretic.      Comments: Patient appears uncomfortable secondary to headache/body aches.  Fully alert and answering questions appropriately.    HENT:      Head: Atraumatic.      Right Ear: Tympanic membrane and ear canal normal.      Left Ear: Tympanic membrane and ear canal normal.      Nose: Nose normal. No rhinorrhea.      Mouth/Throat:      Mouth: Mucous membranes are moist.      Pharynx: Oropharynx is clear. No oropharyngeal exudate or posterior oropharyngeal erythema.   Eyes:      General: No scleral icterus.     Conjunctiva/sclera: Conjunctivae normal.   Cardiovascular:      Rate and Rhythm: Regular rhythm. Tachycardia present.      Pulses: Normal pulses.      Heart sounds: Normal heart sounds. No murmur heard.  Pulmonary:      Effort: Pulmonary effort is normal. No respiratory distress.      Breath sounds: Normal breath sounds. No wheezing or rhonchi.      Comments: Clear to auscultation.  No acute distress.  Abdominal:      General: Abdomen is flat.      Tenderness: There is no abdominal tenderness. There is no guarding or rebound.   Musculoskeletal:         General: Normal range of motion.      Cervical back: Normal range of motion and neck supple. No rigidity or tenderness.      Right lower leg: No edema.      Left lower leg: No edema.   Skin:     General: Skin is warm.      Capillary Refill: Capillary refill takes less than 2 seconds.      Coloration: Skin is not pale.      Findings: No erythema or rash.   Neurological:      General: No focal deficit present.      Mental Status: She is alert and oriented to person, place, and time.      Comments: No meningismus.  Neurological exam is entirely nonfocal.   Psychiatric:         Mood and Affect: Mood " normal.       ED Course        Procedures            Results for orders placed or performed during the hospital encounter of 11/30/24 (from the past 24 hours)   Influenza A/B, RSV and SARS-CoV2 PCR (COVID-19) Nose    Specimen: Nose; Swab   Result Value Ref Range    Influenza A PCR Negative Negative    Influenza B PCR Negative Negative    RSV PCR Negative Negative    SARS CoV2 PCR Negative Negative    Narrative    Testing was performed using the Xpert Xpress CoV2/Flu/RSV Assay on the [employer] GeneXpert Instrument. This test should be ordered for the detection of SARS-CoV2, influenza, and RSV viruses in individuals with signs and symptoms of respiratory tract infection. This test is for in vitro diagnostic use under the [place] FDA for laboratories certified under CLIA to perform high or moderate complexity testing. This test has been [place] FDA cleared. A negative result does not rule out the presence of PCR inhibitors in the specimen or target RNA in concentration below the limit of detection for the assay. If only one viral target is positive but coinfection with multiple targets is suspected, the sample should be re-tested with another FDA cleared, approved, or authorized test, if coninfection would change clinical management. This test was validated by the [facility]. These laboratories are certified under the Clinical Laboratory Improvement Amendments of 1988 (CLIA-88) as qualified to perfom high complexity laboratory testing.       Medications   ketorolac (TORADOL) injection 30 mg (30 mg Intramuscular $Given 11/30/24 4777)   oxyCODONE (ROXICODONE) tablet 5 mg (5 mg Oral $Given 11/30/24 0556)       Assessments & Plan (with Medical Decision Making)     I have reviewed the nursing notes.    I have reviewed the findings, diagnosis, plan and need for follow up with the patient.  Medical Decision Making  Valarie Talavera is a 34 year old female with history of depression, obesity who presents for evaluation  of a headache, fever, body aches.  Febrile on arrival, temperature 102.9  F, tachycardic at 112.  This improved somewhat compared to triage, but she still has mild tachycardia at the time of my exam.  Patient appears anxious and uncomfortable due to headache/body aches.  She has no meningismus and neurological exam is nonfocal.  Lungs are clear to auscultation, abdomen nontender.  It sounds like she has a viral URI with associated headache, body aches.  Oropharynx was unremarkable, as were TMs.  Will start by obtaining a COVID and influenza swab.  Will also administer Toradol for pain relief and fever along with oxycodone for severe body aches.  Patient denied any possibility of pregnancy.  If this testing is negative or if she does not have improved fever/pain, will plan on obtaining blood work, urinalysis, chest x-ray to assess for other causes.    Testing was negative for COVID-19, influenza.  Headache improved but she still had some mild discomfort.  Fever resolved with the above medications.  These results were discussed with the patient and although I think her symptoms are still due to a viral infection, we did discuss obtaining blood work and chest x-ray to further assess for possible infectious causes and sepsis.  She agreed that symptoms are most likely due to a viral infection and was not interested in pursuing any additional workup today.  She would rather go home and continue with anti-inflammatory medications, rest, fluids, and see how she feels over the next few days.  I think this is reasonable with normalized vitals and improved symptoms.  Recommended that she follow-up with her PCP and return to the emergency department in the meantime for any new or worsening symptoms.    Discharge Medication List as of 11/30/2024  6:27 PM        START taking these medications    Details   oxyCODONE (ROXICODONE) 5 MG tablet Take 1 tablet (5 mg) by mouth every 6 hours as needed for severe pain., Disp-6 tablet, R-0,  [employer]           Final diagnoses:   Acute viral syndrome   Fever in adult     11/30/2024   [facility] EMERGENCY DEPT       Zaire Holden MD  11/30/24 0128

## 2024-12-01 NOTE — MEDICATION SCRIBE - ADMISSION MEDICATION HISTORY
Medication Scribe Admission Medication History    Admission medication history is complete. The information provided in this note is only as accurate as the sources available at the time of the update.    Information Source(s): Patient and CareEverywhere/SureScripts via in-person    Pertinent Information: n/a    Changes made to PTA medication list:  Added: None  Deleted: wellbutrin, metformin  Changed: None    Allergies reviewed with patient and updates made in EHR: yes    Medication History Completed By: YASMIN DOMINGUEZ 11/30/2024 6:04 PM    No outpatient medications have been marked as taking for the 11/30/24 encounter (Hospital Encounter).

## 2024-12-01 NOTE — DISCHARGE INSTRUCTIONS
I think your symptoms are most consistent with a viral infection.  Testing was negative for COVID-19, influenza, and RSV, but there are many viruses that cause similar symptoms.    We discussed doing some more testing here in the emergency department today, including a chest x-ray to rule out pneumonia, and blood work to assess for signs of sepsis.    Continue to use Tylenol and ibuprofen for pain and fever control.  You can take up to 1000 mg of Tylenol every 6 hours and up to 600 mg of ibuprofen every 6 hours.  I recommend taking them on an alternating basis every 3 hours in order to provide continuous fever/pain relief.  Prescription for oxycodone was also provided for breakthrough pain.    Follow-up with your primary doctor if symptoms do not improve.  Return to the emergency department in the meantime for any new or worsening symptoms, particularly fevers that do not improve with medicines, severe neck pain or stiffness, worsened cough or difficulty breathing.

## 2024-12-02 ENCOUNTER — HOSPITAL ENCOUNTER (EMERGENCY)
Facility: CLINIC | Age: 34
Discharge: HOME | End: 2024-12-02
Attending: EMERGENCY MEDICINE | Admitting: EMERGENCY MEDICINE
Payer: COMMERCIAL

## 2024-12-02 ENCOUNTER — APPOINTMENT (OUTPATIENT)
Dept: GENERAL RADIOLOGY | Facility: CLINIC | Age: 34
End: 2024-12-02
Attending: EMERGENCY MEDICINE
Payer: COMMERCIAL

## 2024-12-02 VITALS
HEIGHT: 68 IN | DIASTOLIC BLOOD PRESSURE: 67 MMHG | HEART RATE: 87 BPM | TEMPERATURE: 98.9 F | SYSTOLIC BLOOD PRESSURE: 110 MMHG | RESPIRATION RATE: 20 BRPM | OXYGEN SATURATION: 97 % | WEIGHT: 266 LBS | BODY MASS INDEX: 40.32 KG/M2

## 2024-12-02 DIAGNOSIS — E87.6 HYPOKALEMIA: ICD-10-CM

## 2024-12-02 DIAGNOSIS — J06.9 UPPER RESPIRATORY TRACT INFECTION, UNSPECIFIED TYPE: ICD-10-CM

## 2024-12-02 LAB
ANION GAP SERPL CALCULATED.3IONS-SCNC: 11 MMOL/L (ref 7–15)
BASOPHILS # BLD AUTO: 0 10E3/UL (ref 0–0.2)
BASOPHILS NFR BLD AUTO: 0 %
BUN SERPL-MCNC: 8.3 MG/DL (ref 6–20)
CALCIUM SERPL-MCNC: 8.9 MG/DL (ref 8.8–10.4)
CHLORIDE SERPL-SCNC: 98 MMOL/L (ref 98–107)
CREAT SERPL-MCNC: 0.63 MG/DL (ref 0.51–0.95)
EGFRCR SERPLBLD CKD-EPI 2021: >90 ML/MIN/1.73M2
EOSINOPHIL # BLD AUTO: 0.3 10E3/UL (ref 0–0.7)
EOSINOPHIL NFR BLD AUTO: 3 %
ERYTHROCYTE [DISTWIDTH] IN BLOOD BY AUTOMATED COUNT: 12.9 % (ref 10–15)
GLUCOSE SERPL-MCNC: 89 MG/DL (ref 70–99)
HCO3 SERPL-SCNC: 28 MMOL/L (ref 22–29)
HCT VFR BLD AUTO: 36.5 % (ref 35–47)
HGB BLD-MCNC: 12.6 G/DL (ref 11.7–15.7)
IMM GRANULOCYTES # BLD: 0 10E3/UL
IMM GRANULOCYTES NFR BLD: 0 %
LYMPHOCYTES # BLD AUTO: 1.3 10E3/UL (ref 0.8–5.3)
LYMPHOCYTES NFR BLD AUTO: 15 %
MCH RBC QN AUTO: 29.5 PG (ref 26.5–33)
MCHC RBC AUTO-ENTMCNC: 34.5 G/DL (ref 31.5–36.5)
MCV RBC AUTO: 86 FL (ref 78–100)
MONOCYTES # BLD AUTO: 0.7 10E3/UL (ref 0–1.3)
MONOCYTES NFR BLD AUTO: 9 %
NEUTROPHILS # BLD AUTO: 5.8 10E3/UL (ref 1.6–8.3)
NEUTROPHILS NFR BLD AUTO: 72 %
NRBC # BLD AUTO: 0 10E3/UL
NRBC BLD AUTO-RTO: 0 /100
PLATELET # BLD AUTO: 300 10E3/UL (ref 150–450)
POTASSIUM SERPL-SCNC: 3.2 MMOL/L (ref 3.4–5.3)
RBC # BLD AUTO: 4.27 10E6/UL (ref 3.8–5.2)
SODIUM SERPL-SCNC: 137 MMOL/L (ref 135–145)
WBC # BLD AUTO: 8.1 10E3/UL (ref 4–11)

## 2024-12-02 PROCEDURE — 250N000013 HC RX MED GY IP 250 OP 250 PS 637: Performed by: EMERGENCY MEDICINE

## 2024-12-02 PROCEDURE — 96361 HYDRATE IV INFUSION ADD-ON: CPT | Performed by: EMERGENCY MEDICINE

## 2024-12-02 PROCEDURE — 71045 X-RAY EXAM CHEST 1 VIEW: CPT

## 2024-12-02 PROCEDURE — 250N000011 HC RX IP 250 OP 636: Performed by: EMERGENCY MEDICINE

## 2024-12-02 PROCEDURE — 36415 COLL VENOUS BLD VENIPUNCTURE: CPT | Performed by: EMERGENCY MEDICINE

## 2024-12-02 PROCEDURE — 99284 EMERGENCY DEPT VISIT MOD MDM: CPT | Performed by: EMERGENCY MEDICINE

## 2024-12-02 PROCEDURE — 85004 AUTOMATED DIFF WBC COUNT: CPT | Performed by: EMERGENCY MEDICINE

## 2024-12-02 PROCEDURE — 258N000003 HC RX IP 258 OP 636: Performed by: EMERGENCY MEDICINE

## 2024-12-02 PROCEDURE — 99284 EMERGENCY DEPT VISIT MOD MDM: CPT | Mod: 25 | Performed by: EMERGENCY MEDICINE

## 2024-12-02 PROCEDURE — 96374 THER/PROPH/DIAG INJ IV PUSH: CPT | Performed by: EMERGENCY MEDICINE

## 2024-12-02 PROCEDURE — 80048 BASIC METABOLIC PNL TOTAL CA: CPT | Performed by: EMERGENCY MEDICINE

## 2024-12-02 PROCEDURE — 96375 TX/PRO/DX INJ NEW DRUG ADDON: CPT | Performed by: EMERGENCY MEDICINE

## 2024-12-02 RX ORDER — ONDANSETRON 2 MG/ML
4 INJECTION INTRAMUSCULAR; INTRAVENOUS ONCE
Status: COMPLETED | OUTPATIENT
Start: 2024-12-02 | End: 2024-12-02

## 2024-12-02 RX ORDER — MAGNESIUM HYDROXIDE/ALUMINUM HYDROXICE/SIMETHICONE 120; 1200; 1200 MG/30ML; MG/30ML; MG/30ML
15 SUSPENSION ORAL
Status: COMPLETED | OUTPATIENT
Start: 2024-12-02 | End: 2024-12-02

## 2024-12-02 RX ORDER — KETOROLAC TROMETHAMINE 30 MG/ML
30 INJECTION, SOLUTION INTRAMUSCULAR; INTRAVENOUS ONCE
Status: COMPLETED | OUTPATIENT
Start: 2024-12-02 | End: 2024-12-02

## 2024-12-02 RX ORDER — ONDANSETRON 4 MG/1
4 TABLET, ORALLY DISINTEGRATING ORAL EVERY 8 HOURS PRN
Qty: 10 TABLET | Refills: 0 | Status: SHIPPED | OUTPATIENT
Start: 2024-12-02

## 2024-12-02 RX ADMIN — ONDANSETRON 4 MG: 2 INJECTION, SOLUTION INTRAMUSCULAR; INTRAVENOUS at 19:04

## 2024-12-02 RX ADMIN — SODIUM CHLORIDE 1000 ML: 9 INJECTION, SOLUTION INTRAVENOUS at 18:53

## 2024-12-02 RX ADMIN — ALUMINUM HYDROXIDE, MAGNESIUM HYDROXIDE, AND SIMETHICONE 15 ML: 1200; 120; 1200 SUSPENSION ORAL at 19:25

## 2024-12-02 RX ADMIN — KETOROLAC TROMETHAMINE 30 MG: 30 INJECTION, SOLUTION INTRAMUSCULAR at 19:05

## 2024-12-02 ASSESSMENT — COLUMBIA-SUICIDE SEVERITY RATING SCALE - C-SSRS
6. HAVE YOU EVER DONE ANYTHING, STARTED TO DO ANYTHING, OR PREPARED TO DO ANYTHING TO END YOUR LIFE?: NO
2. HAVE YOU ACTUALLY HAD ANY THOUGHTS OF KILLING YOURSELF IN THE PAST MONTH?: NO
1. IN THE PAST MONTH, HAVE YOU WISHED YOU WERE DEAD OR WISHED YOU COULD GO TO SLEEP AND NOT WAKE UP?: NO

## 2024-12-02 ASSESSMENT — ACTIVITIES OF DAILY LIVING (ADL)
ADLS_ACUITY_SCORE: 41

## 2024-12-02 NOTE — ED TRIAGE NOTES
Patient reports being seen Saturday for fever, cough and body aches, says she has not improved. Last Ibu at 1400.

## 2024-12-03 NOTE — DISCHARGE INSTRUCTIONS
Plenty of fluids and rest.  May use ibuprofen up to 600 mg 4 times a day as needed for body aches.  Tylenol up to 1000 mg 4 times a day may also be used.

## 2024-12-03 NOTE — ED PROVIDER NOTES
History     Chief Complaint   Patient presents with    Fever    Cough     HPI  Valarie Talavera is a 34 year old female who presents for evaluation of a respiratory type illness.  Symptoms began 5 days ago with a cough.  Some congestion.  Body aches.  Fever to 103.0.  She was seen here 2 days ago.  Negative COVID, RSV and influenza testing at that time.  No history of lung disease.  Vomited x 3 today.  Difficulty holding things down.  Continuous coughing    Allergies:  Allergies   Allergen Reactions    Other Drug Allergy (See Comments) Hives     Essential Oils    Zithromax [Azithromycin] Other (See Comments)     hives       Problem List:    Patient Active Problem List    Diagnosis Date Noted    Hirsutism 02/19/2024     Priority: Medium    Moderate episode of recurrent major depressive disorder (H) 02/19/2024     Priority: Medium    Tenosynovitis, de Quervain (right hand) 06/12/2019     Priority: Medium    Class 3 severe obesity due to excess calories without serious comorbidity with body mass index (BMI) of 40.0 to 44.9 in adult (H) 03/15/2019     Priority: Medium    Hyperlipidemia LDL goal <160 03/15/2019     Priority: Medium    Bilateral carpal tunnel syndrome 06/15/2018     Priority: Medium    Cubital tunnel syndrome of both upper extremities 06/15/2018     Priority: Medium    Term pregnancy 10/14/2017     Priority: Medium    Gastroesophageal reflux disease with esophagitis 10/04/2017     Priority: Medium    Back pain 08/23/2017     Priority: Medium    Hyperglycemia 07/13/2017     Priority: Medium    Nuchal cord, not applicable or unspecified fetus 06/23/2017     Priority: Medium    Encounter for supervision of other normal pregnancy in third trimester 03/11/2017     Priority: Medium    Vitamin D deficiency 02/02/2016     Priority: Medium    HSIL on Pap smear of cervix 02/02/2016     Priority: Medium     2/2/16: Pap - HSIL. Plan colp  2/11/16 [place].  HSIL. Age 25  Plan: per tele enc on 2/18/16 [place] and cytology in  "August.  Will have pathologist differentiate between WESLY 2 and 3 if possible.  If lesion is worse, then LEEP.  If it is the same or better, then colp and cytology in 6 months.    8/30/16 HSIL pap/+ HR HPV (not 16 or 18). Export not completed.  Per Dr. Luna, have pt. Schedule colp bef 11/30/16.  9/9/16 Pt. Advised.  9/22/16 Export. NO SHOW.  10/6/16 Export visit. NO SHOW.  1/11/17 Export not done. Tracking updated for 6 mo colp/pap.   3/10/17 ASCUS pap/+ HR HPV (not 16 or 18). approx 9 weeks pregnant. Plan: colp around 20 weeks gestation, approx 5/26/17  10/14/17 delivered baby. (6 wk pp due 11/25/17)  3/25/19 Export bx: normal, per provider. Plan: Cotest in 1 due 3/25/20    3/3/21 Reminder letter  4/5/21 Reminder call - spoke to pt.   5/5/21 Lost to follow-up for pap tracking            Past Medical History:    Past Medical History:   Diagnosis Date    Anxiety     Depression with anxiety     Depressive disorder     HSIL on Pap smear of cervix 2/2/2016    Low blood pressure     Vitamin D deficiency        Past Surgical History:    Past Surgical History:   Procedure Laterality Date    NO HISTORY OF SURGERY         Family History:    Family History   Problem Relation Age of Onset    Hypertension No family hx of     Cerebrovascular Disease No family hx of     Hyperlipidemia No family hx of        Social History:  Marital Status:  Single [1]  Social History     Tobacco Use    Smoking status: Former     Types: Cigarettes    Smokeless tobacco: Never    Tobacco comments:     very rare   Substance Use Topics    Alcohol use: No     Alcohol/week: 0.0 standard drinks of alcohol    Drug use: No        Medications:    ondansetron (ZOFRAN ODT) 4 MG ODT tab  oxyCODONE (ROXICODONE) 5 MG tablet          Review of Systems  All other systems are reviewed and are negative    Physical Exam   BP: 120/75  Pulse: 88  Temp: 98.9  F (37.2  C)  Resp: 20  Height: 172.7 cm (5' 8\")  Weight: 120.7 kg (266 lb)  SpO2: 96 %      Physical Exam  Vitals and " nursing note reviewed.   Constitutional:       General: She is not in acute distress.     Appearance: She is well-developed. She is obese. She is not diaphoretic.   HENT:      Head: Normocephalic and atraumatic.      Nose: Nose normal.      Mouth/Throat:      Mouth: Mucous membranes are moist.      Pharynx: Oropharynx is clear.   Eyes:      General: No scleral icterus.     Conjunctiva/sclera: Conjunctivae normal.   Cardiovascular:      Rate and Rhythm: Normal rate and regular rhythm.      Heart sounds: Normal heart sounds. No murmur heard.  Pulmonary:      Effort: Pulmonary effort is normal. No respiratory distress.      Breath sounds: No stridor. No wheezing or rales.   Abdominal:      General: Abdomen is flat. There is no distension.      Palpations: Abdomen is soft. There is no mass.      Tenderness: There is no abdominal tenderness. There is no guarding or rebound.   Musculoskeletal:         General: No tenderness.      Cervical back: Normal range of motion and neck supple.   Skin:     General: Skin is warm and dry.      Coloration: Skin is not pale.      Findings: No erythema or rash.   Neurological:      General: No focal deficit present.      Mental Status: She is alert.   Psychiatric:         Mood and Affect: Mood normal.         ED Course        Procedures                  Results for orders placed or performed during the hospital encounter of 12/02/24 (from the past 24 hours)   CBC with platelets differential    Narrative    The following orders were created for panel order CBC with platelets differential.  Procedure                               Abnormality         Status                     ---------                               -----------         ------                     CBC with platelets and d...[[id number]]                      Final result                 Please view results for these tests on the individual orders.   Basic metabolic panel   Result Value Ref Range    Sodium 137 135 - 145 mmol/L     Potassium 3.2 (L) 3.4 - 5.3 mmol/L    Chloride 98 98 - 107 mmol/L    Carbon Dioxide (CO2) 28 22 - 29 mmol/L    Anion Gap 11 7 - 15 mmol/L    Urea Nitrogen 8.3 6.0 - 20.0 mg/dL    Creatinine 0.63 0.51 - 0.95 mg/dL    GFR Estimate >90 >60 mL/min/1.73m2    Calcium 8.9 8.8 - 10.4 mg/dL    Glucose 89 70 - 99 mg/dL   CBC with platelets and differential   Result Value Ref Range    WBC Count 8.1 4.0 - 11.0 10e3/uL    RBC Count 4.27 3.80 - 5.20 10e6/uL    Hemoglobin 12.6 11.7 - 15.7 g/dL    Hematocrit 36.5 35.0 - 47.0 %    MCV 86 78 - 100 fL    MCH 29.5 26.5 - 33.0 pg    MCHC 34.5 31.5 - 36.5 g/dL    RDW 12.9 10.0 - 15.0 %    Platelet Count 300 150 - 450 10e3/uL    % Neutrophils 72 %    % Lymphocytes 15 %    % Monocytes 9 %    % Eosinophils 3 %    % Basophils 0 %    % Immature Granulocytes 0 %    NRBCs per 100 WBC 0 <1 /100    Absolute Neutrophils 5.8 1.6 - 8.3 10e3/uL    Absolute Lymphocytes 1.3 0.8 - 5.3 10e3/uL    Absolute Monocytes 0.7 0.0 - 1.3 10e3/uL    Absolute Eosinophils 0.3 0.0 - 0.7 10e3/uL    Absolute Basophils 0.0 0.0 - 0.2 10e3/uL    Absolute Immature Granulocytes 0.0 <=0.4 10e3/uL    Absolute NRBCs 0.0 10e3/uL   XR Chest Port 1 View    Narrative    EXAM: XR CHEST PORT 1 VIEW  LOCATION: [facility]  DATE: 12/2/2024    INDICATION: cough  COMPARISON: None.      Impression    IMPRESSION:     Shallow lung inflation with crowding of bronchovascular structures around the radha. The left hemidiaphragm interface is indistinct laterally which could relate to adjacent atelectasis or airspace opacity. There are no right lung airspace opacities.    Cardiac silhouette is normal in size and mediastinal interfaces are normal.    No pleural effusion.       Medications   sodium chloride 0.9% BOLUS 1,000 mL (1,000 mLs Intravenous $New Bag 12/2/24 9755)   ondansetron (ZOFRAN) injection 4 mg (4 mg Intravenous $Given 12/2/24 190)   ketorolac (TORADOL) injection 30 mg (30 mg Intravenous $Given  12/2/24 1905)   alum & mag hydroxide-simethicone (MAALOX) suspension 15 mL (15 mLs Oral $Given 12/2/24 1925)       Assessments & Plan (with Medical Decision Making)  34-year-old female with URI symptoms over the last 5 days.  Afebrile here.  White count normal.  Chest x-ray shows hypoinflation but no obvious infiltrate.  Images reviewed independently.  Here 2 days ago, COVID, RSV and influenza testing negative.  Some vomiting.  Treated with Zofran and IV fluids here for potential dehydration.  Improved.  Mild hypokalemia noted with potassium of 3.2.  Expect this to improve with control of emesis.  Zofran prescribed for home.  Have recommended follow-up in clinic on potassium level in the next 2 weeks.     I have reviewed the nursing notes.    I have reviewed the findings, diagnosis, plan and need for follow up with the patient.          New Prescriptions    ONDANSETRON (ZOFRAN ODT) 4 MG ODT TAB    Take 1 tablet (4 mg) by mouth every 8 hours as needed for nausea.       Final diagnoses:   Upper respiratory tract infection, unspecified type   Hypokalemia       12/2/2024   [facility] EMERGENCY DEPT       Gustavo Gallagher MD  12/02/24 2031

## 2024-12-04 ENCOUNTER — PATIENT OUTREACH (OUTPATIENT)
Dept: CARE COORDINATION | Facility: CLINIC | Age: 34
End: 2024-12-04
Payer: COMMERCIAL

## 2024-12-04 NOTE — PROGRESS NOTES
[facility] Care Resource Center Contact  [facility] [place]/Voicemail     Clinical Data: Care Coordination ED-sourced Outreach-     Outreach attempted x 2.  Left message on patient's voicemail, providing [facility]'s 24/7 scheduling and nurse triage phone number [phone] ([phone]) for questions/concerns and/or to schedule an appt with an [facility] provider.      Care Coordination introduction letter with explanation of Clinic Care Coordination services sent to patient via [employer]. Clinic Care Coordination services remain available via referral if needed.    Plan: General acute hospital will do no further outreaches at this time.       KENTON Garcia  [facility] Resource [place], [facility]    *Connected Care Resource Team does NOT follow patient ongoing. Referrals are identified based on internal discharge reports and the outreach is to ensure patient has an understanding of their discharge instructions.

## 2024-12-04 NOTE — LETTER
Valarie Talavera  [address] [address] [place] [id number]    Dear Valarie Talavera,      I am a team member within the Connected Care Resource Center with [facility]. I recently tried to reach you to ensure you were doing well following a recent visit within our health system. I also wanted to take this chance to introduce Clinic Care Coordination.     Below is a description of Clinic Care Coordination and how this team can further assist you:       The Clinic Care Coordination team is made up of a Registered Nurse, , Financial Resource Worker, and a Community Health Worker who understand and can help navigate the health care system. The goal of clinic care coordination is to help you manage your health, improve access to care, and achieve optimal health outcomes. They work alongside your provider to assist you in determining your health and social needs, obtain health care and community resources, and provide you with necessary information and education. Clinic Care Coordination can work with you through any barriers and develop a care plan that helps coordinate and strengthen the relationship between you and your care team.    If you wish to connect with the Clinic Care Coordination Team, please let your [facility] Primary Care Provider or Clinic Care Team know and they can place a referral. The Clinic Care Coordination team will then reach out by phone to further support you.    We are focused on providing you with the highest-quality healthcare experience possible.    Sincerely,   Deedee MCCURDY  Community Health Worker    Connected Care Resources   [facility] [phone] ([phone]).

## 2024-12-11 ENCOUNTER — HOSPITAL ENCOUNTER (EMERGENCY)
Facility: CLINIC | Age: 34
Discharge: HOME | End: 2024-12-11
Attending: FAMILY MEDICINE | Admitting: FAMILY MEDICINE
Payer: COMMERCIAL

## 2024-12-11 VITALS
DIASTOLIC BLOOD PRESSURE: 83 MMHG | BODY MASS INDEX: 38.62 KG/M2 | SYSTOLIC BLOOD PRESSURE: 143 MMHG | TEMPERATURE: 97.9 F | WEIGHT: 254.8 LBS | HEART RATE: 96 BPM | OXYGEN SATURATION: 95 % | RESPIRATION RATE: 24 BRPM | HEIGHT: 68 IN

## 2024-12-11 DIAGNOSIS — R10.12 ABDOMINAL PAIN, LEFT UPPER QUADRANT: ICD-10-CM

## 2024-12-11 DIAGNOSIS — J18.9 PNEUMONIA OF LEFT LOWER LOBE DUE TO INFECTIOUS ORGANISM: ICD-10-CM

## 2024-12-11 LAB
ALBUMIN SERPL BCG-MCNC: 4 G/DL (ref 3.5–5.2)
ALBUMIN UR-MCNC: 10 MG/DL
ALP SERPL-CCNC: 80 U/L (ref 40–150)
ALT SERPL W P-5'-P-CCNC: 46 U/L (ref 0–50)
ANION GAP SERPL CALCULATED.3IONS-SCNC: 16 MMOL/L (ref 7–15)
APPEARANCE UR: CLEAR
AST SERPL W P-5'-P-CCNC: 31 U/L (ref 0–45)
BASOPHILS # BLD AUTO: 0.1 10E3/UL (ref 0–0.2)
BASOPHILS NFR BLD AUTO: 1 %
BILIRUB SERPL-MCNC: 0.4 MG/DL
BILIRUB UR QL STRIP: NEGATIVE
BUN SERPL-MCNC: 10.3 MG/DL (ref 6–20)
CALCIUM SERPL-MCNC: 9.7 MG/DL (ref 8.8–10.4)
CHLORIDE SERPL-SCNC: 100 MMOL/L (ref 98–107)
COLOR UR AUTO: YELLOW
CREAT SERPL-MCNC: 0.62 MG/DL (ref 0.51–0.95)
EGFRCR SERPLBLD CKD-EPI 2021: >90 ML/MIN/1.73M2
EOSINOPHIL # BLD AUTO: 0.3 10E3/UL (ref 0–0.7)
EOSINOPHIL NFR BLD AUTO: 3 %
ERYTHROCYTE [DISTWIDTH] IN BLOOD BY AUTOMATED COUNT: 13.1 % (ref 10–15)
GLUCOSE SERPL-MCNC: 88 MG/DL (ref 70–99)
GLUCOSE UR STRIP-MCNC: NEGATIVE MG/DL
HCO3 SERPL-SCNC: 22 MMOL/L (ref 22–29)
HCT VFR BLD AUTO: 37.4 % (ref 35–47)
HGB BLD-MCNC: 13.4 G/DL (ref 11.7–15.7)
HGB UR QL STRIP: NEGATIVE
IMM GRANULOCYTES # BLD: 0.1 10E3/UL
IMM GRANULOCYTES NFR BLD: 1 %
KETONES UR STRIP-MCNC: NEGATIVE MG/DL
LEUKOCYTE ESTERASE UR QL STRIP: NEGATIVE
LIPASE SERPL-CCNC: 45 U/L (ref 13–60)
LYMPHOCYTES # BLD AUTO: 2.3 10E3/UL (ref 0.8–5.3)
LYMPHOCYTES NFR BLD AUTO: 23 %
MCH RBC QN AUTO: 30.8 PG (ref 26.5–33)
MCHC RBC AUTO-ENTMCNC: 35.8 G/DL (ref 31.5–36.5)
MCV RBC AUTO: 86 FL (ref 78–100)
MONOCYTES # BLD AUTO: 0.7 10E3/UL (ref 0–1.3)
MONOCYTES NFR BLD AUTO: 7 %
MUCOUS THREADS #/AREA URNS LPF: PRESENT /LPF
NEUTROPHILS # BLD AUTO: 6.8 10E3/UL (ref 1.6–8.3)
NEUTROPHILS NFR BLD AUTO: 66 %
NITRATE UR QL: NEGATIVE
NRBC # BLD AUTO: 0 10E3/UL
NRBC BLD AUTO-RTO: 0 /100
PH UR STRIP: 5.5 [PH] (ref 5–7)
PLATELET # BLD AUTO: 478 10E3/UL (ref 150–450)
POTASSIUM SERPL-SCNC: 3.7 MMOL/L (ref 3.4–5.3)
PROT SERPL-MCNC: 8.1 G/DL (ref 6.4–8.3)
RBC # BLD AUTO: 4.35 10E6/UL (ref 3.8–5.2)
RBC URINE: 2 /HPF
SODIUM SERPL-SCNC: 138 MMOL/L (ref 135–145)
SP GR UR STRIP: 1.03 (ref 1–1.03)
SQUAMOUS EPITHELIAL: 2 /HPF
UROBILINOGEN UR STRIP-MCNC: NORMAL MG/DL
WBC # BLD AUTO: 10.2 10E3/UL (ref 4–11)
WBC URINE: 2 /HPF

## 2024-12-11 PROCEDURE — 80053 COMPREHEN METABOLIC PANEL: CPT | Performed by: FAMILY MEDICINE

## 2024-12-11 PROCEDURE — 83690 ASSAY OF LIPASE: CPT | Performed by: FAMILY MEDICINE

## 2024-12-11 PROCEDURE — 85049 AUTOMATED PLATELET COUNT: CPT | Performed by: FAMILY MEDICINE

## 2024-12-11 PROCEDURE — 85004 AUTOMATED DIFF WBC COUNT: CPT | Performed by: FAMILY MEDICINE

## 2024-12-11 PROCEDURE — 36415 COLL VENOUS BLD VENIPUNCTURE: CPT | Performed by: FAMILY MEDICINE

## 2024-12-11 PROCEDURE — 99284 EMERGENCY DEPT VISIT MOD MDM: CPT | Performed by: FAMILY MEDICINE

## 2024-12-11 PROCEDURE — 99284 EMERGENCY DEPT VISIT MOD MDM: CPT | Mod: 25 | Performed by: FAMILY MEDICINE

## 2024-12-11 PROCEDURE — 81003 URINALYSIS AUTO W/O SCOPE: CPT | Performed by: FAMILY MEDICINE

## 2024-12-11 RX ORDER — CEFDINIR 300 MG/1
300 CAPSULE ORAL 2 TIMES DAILY
Qty: 20 CAPSULE | Refills: 0 | Status: SHIPPED | OUTPATIENT
Start: 2024-12-11

## 2024-12-11 ASSESSMENT — ACTIVITIES OF DAILY LIVING (ADL)
ADLS_ACUITY_SCORE: 41

## 2024-12-11 ASSESSMENT — COLUMBIA-SUICIDE SEVERITY RATING SCALE - C-SSRS
2. HAVE YOU ACTUALLY HAD ANY THOUGHTS OF KILLING YOURSELF IN THE PAST MONTH?: NO
1. IN THE PAST MONTH, HAVE YOU WISHED YOU WERE DEAD OR WISHED YOU COULD GO TO SLEEP AND NOT WAKE UP?: NO
6. HAVE YOU EVER DONE ANYTHING, STARTED TO DO ANYTHING, OR PREPARED TO DO ANYTHING TO END YOUR LIFE?: NO

## 2024-12-11 NOTE — ED TRIAGE NOTES
PT presents with c/o LUQ abdominal pain that started last Monday, progressively got worse since, rates it as 10/10 pain score. PT also reports nausea and vomiting. PT also with concerns that she might have ulcer from the ibuprofen she was taking round the clock for two weeks.

## 2024-12-12 NOTE — DISCHARGE INSTRUCTIONS
Your blood work were reassuring.  The x-ray of your chest reveals a resolving left lower lobe pneumonia.  Take Omnicef 300 mg twice a day for 7-10 days to treat the pneumonia.  Add omeprazole 20 mg up to twice a day for 2 weeks.  Avoid ibuprofen medications for now.  Follow-up with your primary care provider in 7-10 days if not improving.

## 2025-05-22 ENCOUNTER — APPOINTMENT (OUTPATIENT)
Dept: CT IMAGING | Facility: CLINIC | Age: 35
End: 2025-05-22
Attending: STUDENT IN AN ORGANIZED HEALTH CARE EDUCATION/TRAINING PROGRAM
Payer: COMMERCIAL

## 2025-05-22 ENCOUNTER — HOSPITAL ENCOUNTER (EMERGENCY)
Facility: CLINIC | Age: 35
Discharge: HOME | End: 2025-05-22
Attending: STUDENT IN AN ORGANIZED HEALTH CARE EDUCATION/TRAINING PROGRAM | Admitting: STUDENT IN AN ORGANIZED HEALTH CARE EDUCATION/TRAINING PROGRAM
Payer: COMMERCIAL

## 2025-05-22 ENCOUNTER — APPOINTMENT (OUTPATIENT)
Dept: MRI IMAGING | Facility: CLINIC | Age: 35
End: 2025-05-22
Attending: STUDENT IN AN ORGANIZED HEALTH CARE EDUCATION/TRAINING PROGRAM
Payer: COMMERCIAL

## 2025-05-22 ENCOUNTER — OFFICE VISIT (OUTPATIENT)
Dept: FAMILY MEDICINE | Facility: OTHER | Age: 35
End: 2025-05-22
Payer: COMMERCIAL

## 2025-05-22 VITALS
HEART RATE: 90 BPM | SYSTOLIC BLOOD PRESSURE: 123 MMHG | RESPIRATION RATE: 20 BRPM | TEMPERATURE: 97.8 F | DIASTOLIC BLOOD PRESSURE: 61 MMHG | OXYGEN SATURATION: 100 %

## 2025-05-22 VITALS
WEIGHT: 261 LBS | OXYGEN SATURATION: 98 % | DIASTOLIC BLOOD PRESSURE: 76 MMHG | HEART RATE: 82 BPM | TEMPERATURE: 98 F | BODY MASS INDEX: 39.56 KG/M2 | HEIGHT: 68 IN | RESPIRATION RATE: 13 BRPM | SYSTOLIC BLOOD PRESSURE: 114 MMHG

## 2025-05-22 DIAGNOSIS — R42 VERTIGO: ICD-10-CM

## 2025-05-22 DIAGNOSIS — R42 DIZZINESS: Primary | ICD-10-CM

## 2025-05-22 DIAGNOSIS — R55 BRIEF LOSS OF CONSCIOUSNESS: ICD-10-CM

## 2025-05-22 LAB
ALBUMIN SERPL BCG-MCNC: 4.3 G/DL (ref 3.5–5.2)
ALBUMIN UR-MCNC: NEGATIVE MG/DL
ALP SERPL-CCNC: 69 U/L (ref 40–150)
ALT SERPL W P-5'-P-CCNC: 20 U/L (ref 0–50)
ANION GAP SERPL CALCULATED.3IONS-SCNC: 12 MMOL/L (ref 7–15)
APPEARANCE UR: CLEAR
AST SERPL W P-5'-P-CCNC: 15 U/L (ref 0–45)
ATRIAL RATE - MUSE: 81 BPM
BASOPHILS # BLD AUTO: 0 10E3/UL (ref 0–0.2)
BASOPHILS NFR BLD AUTO: 0 %
BILIRUB SERPL-MCNC: 0.4 MG/DL
BILIRUB UR QL STRIP: NEGATIVE
BUN SERPL-MCNC: 12.2 MG/DL (ref 6–20)
CALCIUM SERPL-MCNC: 9.5 MG/DL (ref 8.8–10.4)
CHLORIDE SERPL-SCNC: 102 MMOL/L (ref 98–107)
COLOR UR AUTO: ABNORMAL
CREAT SERPL-MCNC: 0.63 MG/DL (ref 0.51–0.95)
D DIMER PPP FEU-MCNC: <0.27 UG/ML FEU (ref 0–0.5)
DIASTOLIC BLOOD PRESSURE - MUSE: NORMAL MMHG
EGFRCR SERPLBLD CKD-EPI 2021: >90 ML/MIN/1.73M2
EOSINOPHIL # BLD AUTO: 0.1 10E3/UL (ref 0–0.7)
EOSINOPHIL NFR BLD AUTO: 1 %
ERYTHROCYTE [DISTWIDTH] IN BLOOD BY AUTOMATED COUNT: 13.2 % (ref 10–15)
GLUCOSE SERPL-MCNC: 91 MG/DL (ref 70–99)
GLUCOSE UR STRIP-MCNC: NEGATIVE MG/DL
HCG UR QL: NEGATIVE
HCO3 SERPL-SCNC: 25 MMOL/L (ref 22–29)
HCT VFR BLD AUTO: 39.4 % (ref 35–47)
HGB BLD-MCNC: 13.6 G/DL (ref 11.7–15.7)
HGB UR QL STRIP: NEGATIVE
IMM GRANULOCYTES # BLD: 0.1 10E3/UL
IMM GRANULOCYTES NFR BLD: 1 %
INTERPRETATION ECG - MUSE: NORMAL
KETONES UR STRIP-MCNC: NEGATIVE MG/DL
LEUKOCYTE ESTERASE UR QL STRIP: NEGATIVE
LYMPHOCYTES # BLD AUTO: 2.2 10E3/UL (ref 0.8–5.3)
LYMPHOCYTES NFR BLD AUTO: 21 %
MAGNESIUM SERPL-MCNC: 2 MG/DL (ref 1.7–2.3)
MCH RBC QN AUTO: 29.8 PG (ref 26.5–33)
MCHC RBC AUTO-ENTMCNC: 34.5 G/DL (ref 31.5–36.5)
MCV RBC AUTO: 86 FL (ref 78–100)
MONOCYTES # BLD AUTO: 0.7 10E3/UL (ref 0–1.3)
MONOCYTES NFR BLD AUTO: 7 %
MUCOUS THREADS #/AREA URNS LPF: PRESENT /LPF
NEUTROPHILS # BLD AUTO: 7.5 10E3/UL (ref 1.6–8.3)
NEUTROPHILS NFR BLD AUTO: 71 %
NITRATE UR QL: NEGATIVE
NRBC # BLD AUTO: 0 10E3/UL
NRBC BLD AUTO-RTO: 0 /100
P AXIS - MUSE: 38 DEGREES
PH UR STRIP: 6 [PH] (ref 5–7)
PLATELET # BLD AUTO: 364 10E3/UL (ref 150–450)
POTASSIUM SERPL-SCNC: 3.4 MMOL/L (ref 3.4–5.3)
PR INTERVAL - MUSE: 152 MS
PROT SERPL-MCNC: 7.6 G/DL (ref 6.4–8.3)
QRS DURATION - MUSE: 102 MS
QT - MUSE: 394 MS
QTC - MUSE: 457 MS
R AXIS - MUSE: 29 DEGREES
RBC # BLD AUTO: 4.57 10E6/UL (ref 3.8–5.2)
RBC URINE: 1 /HPF
SODIUM SERPL-SCNC: 139 MMOL/L (ref 135–145)
SP GR UR STRIP: 1.03 (ref 1–1.03)
SQUAMOUS EPITHELIAL: 4 /HPF
SYSTOLIC BLOOD PRESSURE - MUSE: NORMAL MMHG
T AXIS - MUSE: 6 DEGREES
TSH SERPL DL<=0.005 MIU/L-ACNC: 0.93 UIU/ML (ref 0.3–4.2)
UROBILINOGEN UR STRIP-MCNC: NORMAL MG/DL
VENTRICULAR RATE- MUSE: 81 BPM
WBC # BLD AUTO: 10.5 10E3/UL (ref 4–11)
WBC URINE: <1 /HPF

## 2025-05-22 PROCEDURE — 70498 CT ANGIOGRAPHY NECK: CPT

## 2025-05-22 PROCEDURE — 250N000009 HC RX 250: Performed by: STUDENT IN AN ORGANIZED HEALTH CARE EDUCATION/TRAINING PROGRAM

## 2025-05-22 PROCEDURE — 99284 EMERGENCY DEPT VISIT MOD MDM: CPT | Performed by: STUDENT IN AN ORGANIZED HEALTH CARE EDUCATION/TRAINING PROGRAM

## 2025-05-22 PROCEDURE — 81025 URINE PREGNANCY TEST: CPT | Performed by: STUDENT IN AN ORGANIZED HEALTH CARE EDUCATION/TRAINING PROGRAM

## 2025-05-22 PROCEDURE — 85041 AUTOMATED RBC COUNT: CPT | Performed by: STUDENT IN AN ORGANIZED HEALTH CARE EDUCATION/TRAINING PROGRAM

## 2025-05-22 PROCEDURE — 70553 MRI BRAIN STEM W/O & W/DYE: CPT

## 2025-05-22 PROCEDURE — 85379 FIBRIN DEGRADATION QUANT: CPT | Performed by: STUDENT IN AN ORGANIZED HEALTH CARE EDUCATION/TRAINING PROGRAM

## 2025-05-22 PROCEDURE — 250N000011 HC RX IP 250 OP 636: Performed by: STUDENT IN AN ORGANIZED HEALTH CARE EDUCATION/TRAINING PROGRAM

## 2025-05-22 PROCEDURE — 70450 CT HEAD/BRAIN W/O DYE: CPT

## 2025-05-22 PROCEDURE — 36415 COLL VENOUS BLD VENIPUNCTURE: CPT | Performed by: STUDENT IN AN ORGANIZED HEALTH CARE EDUCATION/TRAINING PROGRAM

## 2025-05-22 PROCEDURE — 81003 URINALYSIS AUTO W/O SCOPE: CPT | Performed by: STUDENT IN AN ORGANIZED HEALTH CARE EDUCATION/TRAINING PROGRAM

## 2025-05-22 PROCEDURE — 83735 ASSAY OF MAGNESIUM: CPT | Performed by: STUDENT IN AN ORGANIZED HEALTH CARE EDUCATION/TRAINING PROGRAM

## 2025-05-22 PROCEDURE — 80053 COMPREHEN METABOLIC PANEL: CPT | Performed by: STUDENT IN AN ORGANIZED HEALTH CARE EDUCATION/TRAINING PROGRAM

## 2025-05-22 PROCEDURE — 258N000003 HC RX IP 258 OP 636: Performed by: STUDENT IN AN ORGANIZED HEALTH CARE EDUCATION/TRAINING PROGRAM

## 2025-05-22 PROCEDURE — A9585 GADOBUTROL INJECTION: HCPCS | Performed by: STUDENT IN AN ORGANIZED HEALTH CARE EDUCATION/TRAINING PROGRAM

## 2025-05-22 PROCEDURE — 99207 PR NO BILLABLE SERVICE THIS VISIT: CPT

## 2025-05-22 PROCEDURE — 99285 EMERGENCY DEPT VISIT HI MDM: CPT | Mod: 25 | Performed by: STUDENT IN AN ORGANIZED HEALTH CARE EDUCATION/TRAINING PROGRAM

## 2025-05-22 PROCEDURE — 93010 ELECTROCARDIOGRAM REPORT: CPT | Performed by: STUDENT IN AN ORGANIZED HEALTH CARE EDUCATION/TRAINING PROGRAM

## 2025-05-22 PROCEDURE — 255N000002 HC RX 255 OP 636: Performed by: STUDENT IN AN ORGANIZED HEALTH CARE EDUCATION/TRAINING PROGRAM

## 2025-05-22 PROCEDURE — 93005 ELECTROCARDIOGRAM TRACING: CPT | Performed by: STUDENT IN AN ORGANIZED HEALTH CARE EDUCATION/TRAINING PROGRAM

## 2025-05-22 PROCEDURE — 84443 ASSAY THYROID STIM HORMONE: CPT | Performed by: STUDENT IN AN ORGANIZED HEALTH CARE EDUCATION/TRAINING PROGRAM

## 2025-05-22 PROCEDURE — 96361 HYDRATE IV INFUSION ADD-ON: CPT | Performed by: STUDENT IN AN ORGANIZED HEALTH CARE EDUCATION/TRAINING PROGRAM

## 2025-05-22 PROCEDURE — 250N000013 HC RX MED GY IP 250 OP 250 PS 637: Performed by: STUDENT IN AN ORGANIZED HEALTH CARE EDUCATION/TRAINING PROGRAM

## 2025-05-22 PROCEDURE — 96374 THER/PROPH/DIAG INJ IV PUSH: CPT | Mod: 59 | Performed by: STUDENT IN AN ORGANIZED HEALTH CARE EDUCATION/TRAINING PROGRAM

## 2025-05-22 RX ORDER — DIAZEPAM 2 MG/1
2 TABLET ORAL ONCE
Status: COMPLETED | OUTPATIENT
Start: 2025-05-22 | End: 2025-05-22

## 2025-05-22 RX ORDER — METHYLPREDNISOLONE 4 MG/1
TABLET ORAL
Qty: 21 TABLET | Refills: 0 | Status: SHIPPED | OUTPATIENT
Start: 2025-05-22

## 2025-05-22 RX ORDER — SCOPOLAMINE 1 MG/3D
1 PATCH, EXTENDED RELEASE TRANSDERMAL
Qty: 4 PATCH | Refills: 0 | Status: CANCELLED | OUTPATIENT
Start: 2025-05-22

## 2025-05-22 RX ORDER — MECLIZINE HYDROCHLORIDE 25 MG/1
25 TABLET ORAL 3 TIMES DAILY PRN
Qty: 30 TABLET | Refills: 0 | Status: CANCELLED | OUTPATIENT
Start: 2025-05-22

## 2025-05-22 RX ORDER — GADOBUTROL 604.72 MG/ML
10 INJECTION INTRAVENOUS ONCE
Status: DISCONTINUED | OUTPATIENT
Start: 2025-05-22 | End: 2025-05-22

## 2025-05-22 RX ORDER — MECLIZINE HYDROCHLORIDE 25 MG/1
25 TABLET ORAL 3 TIMES DAILY PRN
Qty: 30 TABLET | Refills: 0 | Status: SHIPPED | OUTPATIENT
Start: 2025-05-22

## 2025-05-22 RX ORDER — DEXAMETHASONE SODIUM PHOSPHATE 10 MG/ML
10 INJECTION, SOLUTION INTRAMUSCULAR; INTRAVENOUS ONCE
Status: COMPLETED | OUTPATIENT
Start: 2025-05-22 | End: 2025-05-22

## 2025-05-22 RX ORDER — LORAZEPAM 1 MG/1
1 TABLET ORAL EVERY 6 HOURS PRN
Qty: 10 TABLET | Refills: 0 | Status: SHIPPED | OUTPATIENT
Start: 2025-05-22

## 2025-05-22 RX ORDER — DIAZEPAM 10 MG/2ML
2.5 INJECTION, SOLUTION INTRAMUSCULAR; INTRAVENOUS ONCE
Status: COMPLETED | OUTPATIENT
Start: 2025-05-22 | End: 2025-05-22

## 2025-05-22 RX ORDER — ONDANSETRON 2 MG/ML
4 INJECTION INTRAMUSCULAR; INTRAVENOUS ONCE
Status: CANCELLED | OUTPATIENT
Start: 2025-05-22 | End: 2025-05-22

## 2025-05-22 RX ORDER — GADOBUTROL 604.72 MG/ML
10 INJECTION INTRAVENOUS ONCE
Status: COMPLETED | OUTPATIENT
Start: 2025-05-22 | End: 2025-05-22

## 2025-05-22 RX ORDER — IOPAMIDOL 755 MG/ML
500 INJECTION, SOLUTION INTRAVASCULAR ONCE
Status: COMPLETED | OUTPATIENT
Start: 2025-05-22 | End: 2025-05-22

## 2025-05-22 RX ORDER — ONDANSETRON 4 MG/1
4 TABLET, ORALLY DISINTEGRATING ORAL EVERY 8 HOURS PRN
Qty: 30 TABLET | Refills: 0 | Status: CANCELLED | OUTPATIENT
Start: 2025-05-22

## 2025-05-22 RX ORDER — PREDNISONE 20 MG/1
TABLET ORAL
Qty: 10 TABLET | Refills: 0 | Status: SHIPPED | OUTPATIENT
Start: 2025-05-22 | End: 2025-05-22

## 2025-05-22 RX ORDER — DIAZEPAM 10 MG/2ML
2.5 INJECTION, SOLUTION INTRAMUSCULAR; INTRAVENOUS ONCE
Status: DISCONTINUED | OUTPATIENT
Start: 2025-05-22 | End: 2025-05-22 | Stop reason: ALTCHOICE

## 2025-05-22 RX ORDER — MECLIZINE HYDROCHLORIDE 25 MG/1
25 TABLET ORAL ONCE
Status: COMPLETED | OUTPATIENT
Start: 2025-05-22 | End: 2025-05-22

## 2025-05-22 RX ADMIN — GADOBUTROL 10 ML: 604.72 INJECTION INTRAVENOUS at 17:59

## 2025-05-22 RX ADMIN — SODIUM CHLORIDE 1000 ML: 0.9 INJECTION, SOLUTION INTRAVENOUS at 15:37

## 2025-05-22 RX ADMIN — DIAZEPAM 2 MG: 2 TABLET ORAL at 16:17

## 2025-05-22 RX ADMIN — SODIUM CHLORIDE 100 ML: 9 INJECTION, SOLUTION INTRAVENOUS at 16:30

## 2025-05-22 RX ADMIN — IOPAMIDOL 67 ML: 755 INJECTION, SOLUTION INTRAVENOUS at 16:30

## 2025-05-22 RX ADMIN — MECLIZINE HYDROCHLORIDE 25 MG: 25 TABLET ORAL at 15:27

## 2025-05-22 RX ADMIN — DEXAMETHASONE SODIUM PHOSPHATE 10 MG: 10 INJECTION, SOLUTION INTRAMUSCULAR; INTRAVENOUS at 18:28

## 2025-05-22 ASSESSMENT — COLUMBIA-SUICIDE SEVERITY RATING SCALE - C-SSRS
1. IN THE PAST MONTH, HAVE YOU WISHED YOU WERE DEAD OR WISHED YOU COULD GO TO SLEEP AND NOT WAKE UP?: NO
2. HAVE YOU ACTUALLY HAD ANY THOUGHTS OF KILLING YOURSELF IN THE PAST MONTH?: NO
6. HAVE YOU EVER DONE ANYTHING, STARTED TO DO ANYTHING, OR PREPARED TO DO ANYTHING TO END YOUR LIFE?: NO

## 2025-05-22 ASSESSMENT — PAIN SCALES - GENERAL: PAINLEVEL_OUTOF10: NO PAIN (0)

## 2025-05-22 ASSESSMENT — PATIENT HEALTH QUESTIONNAIRE - PHQ9
10. IF YOU CHECKED OFF ANY PROBLEMS, HOW DIFFICULT HAVE THESE PROBLEMS MADE IT FOR YOU TO DO YOUR WORK, TAKE CARE OF THINGS AT HOME, OR GET ALONG WITH OTHER PEOPLE: NOT DIFFICULT AT ALL
SUM OF ALL RESPONSES TO PHQ QUESTIONS 1-9: 5
SUM OF ALL RESPONSES TO PHQ QUESTIONS 1-9: 5

## 2025-05-22 ASSESSMENT — ACTIVITIES OF DAILY LIVING (ADL)
ADLS_ACUITY_SCORE: 41

## 2025-05-22 ASSESSMENT — ENCOUNTER SYMPTOMS: DIZZINESS: 1

## 2025-05-22 NOTE — CONSULTS
"  [facility]    Stroke Telephone Note    I was called by Dr. Evelia Andrew on 05/22/25 regarding patient Valarie Talavera. The patient is a 34 year old female with a past medical history significant for \"intermittent\" tobacco use and GERD who presents today with ongoing vertigo x 1 week.  History obtained from ED provider.  Valarie reports she has been having severe vertigo for the past week that worsens when she is laying down or resting and actually improves with movement.  In the ED she had some relief of her vertiginous symptoms after benzo and meclizine administration.  On ED providers examination, no focal neurologic deficit appreciated.  Patient denies associated headache.  Presenting blood pressure 106/82.  Not on aspirin or statin PTA    Vitals  BP: 106/82   Pulse: 90   Resp: 20   Temp: 97.8  F (36.6  C)        Imaging Findings  CT head: Pending  CTA head/neck: Pending    Impression  Severe vertigo x 1 week that improves with movement and worsens when supine/with rest, unclear etiology.  Current neurologic exam is nonfocal.  Differential includes atypical peripheral etiology versus toxic metabolic infectious etiology versus vestibular migraine versus low suspicion for neurovascular etiology    Recommendations  - Agree with plan to obtain CT head without contrast and CTA head and neck  - If CT and CTA are unrevealing, recommend MRI brain with and without contrast with coronal DWI; please page stroke neurology when completed for review and for further recommendations  - Recommend symptomatic management of headache per primary team    Case discussed with vascular neurology attending Dr. Quijano.    My recommendations are based on the information provided over the phone by Valarie Talavera's in-person providers. They are not intended to replace the clinical judgment of her in-person providers. I was not requested to personally see or examine the patient at this time.     Lizbeth Santos " "RAHEEL Schaeffer  Vascular Neurology    To page me or covering stroke neurology team member, click here: [employer]  Choose \"On Call\" tab at top, then select \"NEUROLOGY/ALL SITES\" from middle drop-down box, press Enter, then look for \"stroke\" or \"telestroke\" for your site.   "

## 2025-05-22 NOTE — ED PROVIDER NOTES
History     Chief Complaint   Patient presents with    Dizziness     HPI  Valarie Talavera is a 34 year old female who senting with severe vertigo.  She notes that started a week ago.  She went to urgent care today and was told to come to the ER for further evaluation imaging.  Ultimately patient notes that she has not had any preceding symptoms injury chest pain chills breathing shortness of breath cough and slurring of her speech hearing changes vision changes however notes that when she sits still or lays flat she has sudden onset sensation of feeling she is falling.  She notes that this actually improves when she is moving about.  She notes that her eyes feel like they are shaking when she has a sensation of falling.  Patient denies a stroke history cardiac history and is not on any blood thinning medications.  She is otherwise pleasant denies any nausea vomiting or fevers or any other associated viral-like illnesses abdominal pains diarrhea or stooling changes or rashes.    Allergies:  Allergies   Allergen Reactions    Other Drug Allergy (See Comments) Hives     Essential Oils    Zithromax [Azithromycin] Other (See Comments)     hives       Problem List:    Patient Active Problem List    Diagnosis Date Noted    Hirsutism 02/19/2024     Priority: Medium    Moderate episode of recurrent major depressive disorder (H) 02/19/2024     Priority: Medium    Tenosynovitis, de Quervain (right hand) 06/12/2019     Priority: Medium    Class 3 severe obesity due to excess calories without serious comorbidity with body mass index (BMI) of 40.0 to 44.9 in adult (H) 03/15/2019     Priority: Medium    Hyperlipidemia LDL goal <160 03/15/2019     Priority: Medium    Bilateral carpal tunnel syndrome 06/15/2018     Priority: Medium    Cubital tunnel syndrome of both upper extremities 06/15/2018     Priority: Medium    Term pregnancy 10/14/2017     Priority: Medium    Gastroesophageal reflux disease with esophagitis 10/04/2017      Priority: Medium    Back pain 08/23/2017     Priority: Medium    Hyperglycemia 07/13/2017     Priority: Medium    Nuchal cord, not applicable or unspecified fetus 06/23/2017     Priority: Medium    Encounter for supervision of other normal pregnancy in third trimester 03/11/2017     Priority: Medium    Vitamin D deficiency 02/02/2016     Priority: Medium    HSIL on Pap smear of cervix 02/02/2016     Priority: Medium     2/2/16: Pap - HSIL. Plan colp  2/11/16 [facility].  HSIL. Age 25  Plan: per tele enc on 2/18/16 [facility] and cytology in August.  Will have pathologist differentiate between WESLY 2 and 3 if possible.  If lesion is worse, then LEEP.  If it is the same or better, then colp and cytology in 6 months.    8/30/16 HSIL pap/+ HR HPV (not 16 or 18). Stanley not completed.  Per Dr. Luna, have pt. Schedule colp bef 11/30/16.  9/9/16 Pt. Advised.  9/22/16 [facility]. NO SHOW.  10/6/16 [facility] visit. NO SHOW.  1/11/17 Stanley not done. Tracking updated for 6 mo colp/pap.   3/10/17 ASCUS pap/+ HR HPV (not 16 or 18). approx 9 weeks pregnant. Plan: colp around 20 weeks gestation, approx 5/26/17  10/14/17 delivered baby. (6 wk pp due 11/25/17)  3/25/19 Stanley bx: normal, per provider. Plan: Cotest in 1 due 3/25/20    3/3/21 Reminder letter  4/5/21 Reminder call - spoke to pt.   5/5/21 Lost to follow-up for pap tracking            Past Medical History:    Past Medical History:   Diagnosis Date    Anxiety     Depression with anxiety     Depressive disorder     HSIL on Pap smear of cervix 2/2/2016    Low blood pressure     Vitamin D deficiency        Past Surgical History:    Past Surgical History:   Procedure Laterality Date    NO HISTORY OF SURGERY         Family History:    Family History   Problem Relation Age of Onset    Diabetes Paternal Grandfather     Hypertension No family hx of     Cerebrovascular Disease No family hx of     Hyperlipidemia No family hx of        Social History:  Marital Status:  Single [1]  Social History     Tobacco  Use    Smoking status: Former     Types: Cigarettes     Passive exposure: Never    Smokeless tobacco: Never    Tobacco comments:     very rare   Vaping Use    Vaping status: Never Used   Substance Use Topics    Alcohol use: No     Alcohol/week: 0.0 standard drinks of alcohol    Drug use: No        Medications:    LORazepam (ATIVAN) 1 MG tablet  meclizine (ANTIVERT) 25 MG tablet  methylPREDNISolone (MEDROL DOSEPAK) 4 MG tablet therapy pack          Review of Systems   Neurological:         Moment related dizziness   All other systems reviewed and are negative.      Physical Exam   BP: 106/82  Pulse: 90  Temp: 97.8  F (36.6  C)  Resp: 20  SpO2: 100 %      Physical Exam  Vitals and nursing note reviewed.   Constitutional:       General: She is not in acute distress.     Appearance: Normal appearance. She is obese. She is not ill-appearing or diaphoretic.   HENT:      Head: Normocephalic and atraumatic.      Right Ear: Tympanic membrane normal.      Left Ear: Tympanic membrane normal.      Mouth/Throat:      Mouth: Mucous membranes are moist.   Eyes:      General: No scleral icterus.     Conjunctiva/sclera: Conjunctivae normal.   Cardiovascular:      Rate and Rhythm: Normal rate.      Pulses: Normal pulses.      Heart sounds: Normal heart sounds.   Pulmonary:      Effort: No respiratory distress.      Breath sounds: Normal breath sounds.   Abdominal:      General: Abdomen is flat. Bowel sounds are normal. There is no distension.      Palpations: Abdomen is soft.      Tenderness: There is no abdominal tenderness. There is no guarding.   Musculoskeletal:      Cervical back: Neck supple.   Skin:     General: Skin is warm.      Findings: No rash.   Neurological:      General: No focal deficit present.      Mental Status: She is alert and oriented to person, place, and time. Mental status is at baseline.      Cranial Nerves: No cranial nerve deficit.      Sensory: No sensory deficit.      Motor: No weakness.       Coordination: Coordination normal.      Gait: Gait normal.      Comments: Significant dizziness and sensation of falling when laying flat or sitting still.  Moving symptoms resolved.   Psychiatric:         Mood and Affect: Mood normal.         ED Course        Procedures         EKG EKG self interpreted at 1531.  Normal sinus rhythm at 81 bpm.  There is no signs of significant axis deviation however there is signs of some mild right bundle branch block.  No obvious signs of ST segment elevations or depressions that are worrisome for an NSTEMI at this time.  Her EKG also has a unremarkable.  For 152 a QRS of 102 and a QTc of 557.       Results for orders placed or performed during the hospital encounter of 05/22/25 (from the past 24 hours)   UA with Microscopic reflex to Culture    Specimen: Urine, Clean Catch   Result Value Ref Range    Color Urine Light Yellow Colorless, Straw, Light Yellow, Yellow    Appearance Urine Clear Clear    Glucose Urine Negative Negative mg/dL    Bilirubin Urine Negative Negative    Ketones Urine Negative Negative mg/dL    Specific Gravity Urine 1.027 1.003 - 1.035    Blood Urine Negative Negative    pH Urine 6.0 5.0 - 7.0    Protein Albumin Urine Negative Negative mg/dL    Urobilinogen Urine Normal Normal mg/dL    Nitrite Urine Negative Negative    Leukocyte Esterase Urine Negative Negative    Mucus Urine Present (A) None Seen /LPF    RBC Urine 1 <=2 /HPF    WBC Urine <1 <=5 /HPF    Squamous Epithelials Urine 4 (H) <=1 /HPF    Narrative    Urine Culture not indicated   HCG qualitative urine   Result Value Ref Range    hCG Urine Qualitative Negative Negative   EKG 12-lead, tracing only   Result Value Ref Range    Systolic Blood Pressure  mmHg    Diastolic Blood Pressure  mmHg    Ventricular Rate 81 BPM    Atrial Rate 81 BPM    GA Interval 152 ms    QRS Duration 102 ms     ms    QTc 457 ms    P Axis 38 degrees    R AXIS 29 degrees    T Axis 6 degrees    Interpretation ECG       Sinus  rhythm  Incomplete right bundle branch block  Borderline ECG  No previous ECGs available  Confirmed by SEE ED PROVIDER NOTE FOR, ECG INTERPRETATION ([id number]),  Seda Anderson ([id number]) on 5/22/2025 6:11:37 PM     CBC with platelets differential    Narrative    The following orders were created for panel order CBC with platelets differential.  Procedure                               Abnormality         Status                     ---------                               -----------         ------                     CBC with platelets and ...[[id number]]                      Final result                 Please view results for these tests on the individual orders.   Comprehensive metabolic panel   Result Value Ref Range    Sodium 139 135 - 145 mmol/L    Potassium 3.4 3.4 - 5.3 mmol/L    Carbon Dioxide (CO2) 25 22 - 29 mmol/L    Anion Gap 12 7 - 15 mmol/L    Urea Nitrogen 12.2 6.0 - 20.0 mg/dL    Creatinine 0.63 0.51 - 0.95 mg/dL    GFR Estimate >90 >60 mL/min/1.73m2    Calcium 9.5 8.8 - 10.4 mg/dL    Chloride 102 98 - 107 mmol/L    Glucose 91 70 - 99 mg/dL    Alkaline Phosphatase 69 40 - 150 U/L    AST 15 0 - 45 U/L    ALT 20 0 - 50 U/L    Protein Total 7.6 6.4 - 8.3 g/dL    Albumin 4.3 3.5 - 5.2 g/dL    Bilirubin Total 0.4 <=1.2 mg/dL   Magnesium   Result Value Ref Range    Magnesium 2.0 1.7 - 2.3 mg/dL   TSH with free T4 reflex   Result Value Ref Range    TSH 0.93 0.30 - 4.20 uIU/mL   CBC with platelets and differential   Result Value Ref Range    WBC Count 10.5 4.0 - 11.0 10e3/uL    RBC Count 4.57 3.80 - 5.20 10e6/uL    Hemoglobin 13.6 11.7 - 15.7 g/dL    Hematocrit 39.4 35.0 - 47.0 %    MCV 86 78 - 100 fL    MCH 29.8 26.5 - 33.0 pg    MCHC 34.5 31.5 - 36.5 g/dL    RDW 13.2 10.0 - 15.0 %    Platelet Count 364 150 - 450 10e3/uL    % Neutrophils 71 %    % Lymphocytes 21 %    % Monocytes 7 %    % Eosinophils 1 %    % Basophils 0 %    % Immature Granulocytes 1 %    NRBCs per 100 WBC 0 <1 /100    Absolute  Neutrophils 7.5 1.6 - 8.3 10e3/uL    Absolute Lymphocytes 2.2 0.8 - 5.3 10e3/uL    Absolute Monocytes 0.7 0.0 - 1.3 10e3/uL    Absolute Eosinophils 0.1 0.0 - 0.7 10e3/uL    Absolute Basophils 0.0 0.0 - 0.2 10e3/uL    Absolute Immature Granulocytes 0.1 <=0.4 10e3/uL    Absolute NRBCs 0.0 10e3/uL   D dimer quantitative   Result Value Ref Range    D-Dimer Quantitative <0.27 0.00 - 0.50 ug/mL FEU    Narrative    This D-dimer assay is intended for use in conjunction with a clinical pretest probability assessment model to exclude pulmonary embolism (PE) and deep venous thrombosis (DVT) in outpatients suspected of PE or DVT. The cut-off value is 0.50 ug/mL FEU.   Head CT w/o contrast    Narrative    EXAM: CT HEAD W/O CONTRAST  LOCATION: [facility]  DATE: 5/22/2025    INDICATION: Severe vertigo  COMPARISON: None   TECHNIQUE: Routine CT Head without IV contrast. Multiplanar reformats. Dose reduction techniques were used.    FINDINGS:  INTRACRANIAL CONTENTS: No acute intracranial hemorrhage, extra-axial fluid collection, or mass effect. Portions of the posterior fossa are obscured by streak artifact. No evidence of an acute transcortical confluent infarct. Normal parenchymal   attenuation. Normal ventricles and sulci for age. Normally positioned cerebellar tonsils.    VISUALIZED ORBITS/SINUSES/MASTOIDS: No acute intraorbital finding. No evidence of significant paranasal sinus or mastoid mucosal disease.     BONES/SOFT TISSUES: No acute abnormality.      Impression    IMPRESSION:  1.  No acute intracranial abnormality.   CTA Head Neck with Contrast    Narrative    EXAM: CTA HEAD NECK W CONTRAST  LOCATION: [facility]  DATE: 5/22/2025    INDICATION: Positional Vertigo.  COMPARISON: 05/22/2025 CT head.  CONTRAST: Isovue 370, 67ML  TECHNIQUE: Head and neck CT angiogram with IV contrast. Axial helical CT images of the head and neck vessels obtained during the arterial  phase of intravenous contrast administration. Axial 2D reconstructed images and multiplanar 3D MIP reconstructed   images of the head and neck vessels were performed by the technologist. Dose reduction techniques were used. All stenosis measurements made according to NASCET criteria unless otherwise specified.    FINDINGS:     HEAD CTA:  ANTERIOR CIRCULATION: No stenosis/occlusion, aneurysm, or high flow vascular malformation. Standard [place] anatomy.    POSTERIOR CIRCULATION: No stenosis/occlusion, aneurysm, or high flow vascular malformation. Dominant right and smaller left vertebral artery contribute to a normal basilar artery.     DURAL VENOUS SINUSES: Expected enhancement of the major dural venous sinuses.    NECK CTA:  RIGHT CAROTID: No measurable stenosis or dissection.    LEFT CAROTID: No measurable stenosis or dissection.    VERTEBRAL ARTERIES: No focal stenosis or dissection. Dominant right and smaller left vertebral arteries.    AORTIC ARCH: Four-vessel aortic arch anatomy. The left vertebral artery arises from the aortic arch. There is no significant stenosis involving the origins of the great vessels.    NONVASCULAR STRUCTURES: Low-density left thyroid lobe nodule measuring 0.5 cm. Left superior thyroid lobe, low-density nodule measuring up to 1.5 cm (image 56 series 5).      Impression    IMPRESSION:     HEAD CTA:   1.  Normal CTA [place].    NECK CTA:  1.  No hemodynamically significant stenosis in the neck vessels.   2.  No evidence for dissection.  3.  Left superior thyroid lobe, low-density nodule measuring up to 1.5 cm. Recommend non-emergent thyroid function testing and thyroid ultrasound for further assessment.     MR Brain w/o & w Contrast    Narrative    EXAM: MR BRAIN W/O and W CONTRAST  LOCATION: [facility]  DATE: 5/22/2025    INDICATION: Severe vertigo at rest, improved with moevment  COMPARISON: CTA head neck dated 05/22/2025  CONTRAST:  10mL Gadavist  TECHNIQUE: Routine multiplanar multisequence head MRI without and with intravenous contrast.    FINDINGS:  INTRACRANIAL CONTENTS: No acute or subacute infarct. No mass, acute hemorrhage, or extra-axial fluid collections. Normal brain parenchymal signal. Normal ventricles and sulci. Normal position of the cerebellar tonsils. No pathologic contrast enhancement.   Left frontal lobe developmental venous anomaly.    SELLA: No abnormality accounting for technique.    OSSEOUS STRUCTURES/SOFT TISSUES: Normal marrow signal. The major intracranial vascular flow voids are maintained.     ORBITS: No abnormality accounting for technique.     SINUSES/MASTOIDS: No paranasal sinus mucosal disease. No middle ear or mastoid effusion.       Impression    IMPRESSION:  1.  No acute intracranial process. Specifically, no acute/subacute infarct, mass, acute hemorrhage or abnormal extra-axial fluid collection.       Medications   dexAMETHasone PF (DECADRON) injection 10 mg (has no administration in time range)   diazepam (VALIUM) injection 2.5 mg (has no administration in time range)   sodium chloride 0.9% BOLUS 1,000 mL (1,000 mLs Intravenous $New Bag 5/22/25 1537)   meclizine (ANTIVERT) tablet 25 mg (25 mg Oral $Given 5/22/25 1527)   diazepam (VALIUM) tablet 2 mg (2 mg Oral $Given 5/22/25 1617)   iopamidol (ISOVUE-370) solution 500 mL (67 mLs Intravenous $Given 5/22/25 1630)   sodium chloride 0.9 % bag for CT scan flush (100 mLs Intravenous $Given 5/22/25 1630)   gadobutrol (GADAVIST) injection 10 mL (10 mLs Intravenous $Given 5/22/25 1759)       Assessments & Plan (with Medical Decision Making)     I have reviewed the nursing notes.    I have reviewed the findings, diagnosis, plan and need for follow up with the patient.      Medical Decision Making  Valarie Talavera is a 34 year old female who senting with severe vertigo.  She notes that started a week ago.  She went to urgent care today and was told to come to the ER for  further evaluation imaging.  Ultimately patient notes that she has not had any preceding symptoms injury chest pain chills breathing shortness of breath cough and slurring of her speech hearing changes vision changes however notes that when she sits still or lays flat she has sudden onset sensation of feeling she is falling.  She notes that this actually improves when she is moving about.  She notes that her eyes feel like they are shaking when she has a sensation of falling.  Patient denies a stroke history cardiac history and is not on any blood thinning medications.  She is otherwise pleasant denies any nausea vomiting or fevers or any other associated viral-like illnesses abdominal pains diarrhea or stooling changes or rashes.    Vitals show blood pressure 106/82 temperature 97.8 pulse of 90 and oxygen saturation of 100% on room air.  She was walking on the room and looks otherwise pleasant.  Laying flat patient has a sensation of falling and is unable to keep her eyes open to do a full assessment of her vision.  However did not appreciate any nystagmus during those episodes when trying to manually keep the eyes open.  Pupils are round reactive to light and did not have any significant light sensitivity.  Her ears are clear bilaterally without any tympanic membrane abnormalities or signs of effusion.  She is no swelling around the ostitis area or mastoid area.  Her C-spine is unremarkable she has full range of motion of her extremities with no numbness tingling or weakness or other acute concerns of neurological changes.  Aside from the vertiginous symptoms she has no acute cranial nerve deficits.  At this time it seems peripheral however her ambulation and dissipation of the symptoms when moving again if you think this is movement related however also goes against the peripheral aspect of diagnosis for neurological dizziness explanations.  Ultimately believe that stroke might directly be possible therefore we  will move forward with CT imaging and consult neurostroke at this time even notes a week out plan will be to do CT imaging of the head and neck with contrast to look for any other acute vascular contributing factors such as aneurysms dissections or occlusions.    Patient's lab work shows a negative D-dimer at 0.27 TSH at 0.93 magnesium 2.0 a CMP and a CBC that are unremarkable and negative hCG and a UA without signs of infection.    CT imaging of head and neck unremarkable including CT without for any vascular compromise masses strokes or other acute pathology.  Discussed case with stroke neuro recommending MRI to rule out any other acute concerns including stroke.    was able to get MRI with no acute signs of stroke masses or other acute pathology including lesions during the patient symptoms.  On reassessment patient feels much better she has not had any recurrence of her symptoms after treatment with the Valium meclizine and Decadron.    This time with workup and symptom improvement medications and imaging studies have been done low concern for central cause of patient's vertigo more likely to be associate with peripheral cause.  We discussed outpatient follow-up symptoms persist over into the next week however will provide patient with meclizine Ativan for severe symptoms and a Medrol Dosepak for considerations of other underlying possible causes such as lab otitis versus vestibular neuritis versus Ménière's with no unlikely as patient not having other symptoms but could be possible.  BPPV also considered but unlikely with patient's presentation.    Patient is happy with current plan in place and feels comfortable discharge plan.  Consulted stroke neuro and happy with plan to discharge home with no aspirin or Plavix initiation.  New Prescriptions    LORAZEPAM (ATIVAN) 1 MG TABLET    Take 1 tablet (1 mg) by mouth every 6 hours as needed (vertigo).    MECLIZINE (ANTIVERT) 25 MG TABLET    Take 1 tablet (25 mg) by  mouth 3 times daily as needed for dizziness.    METHYLPREDNISOLONE (MEDROL DOSEPAK) 4 MG TABLET THERAPY PACK    Follow Package Directions       Final diagnoses:   Vertigo       5/22/2025   [facility] EMERGENCY DEPT       Evelia Andrew MD  05/22/25 [id number]

## 2025-05-22 NOTE — DISCHARGE INSTRUCTIONS
MRI did not show any signs of acute stroke or hemorrhage or masses.  CT imaging did not show any carotid artery pathology or concerns for vascular concern.  And your lab work is reassuring including your heart enzymes.  Your symptoms could be secondary to peripheral pathology as we discussed.      3 medications prescribed Ativan should be used for severe symptoms otherwise meclizine should be used for mainstay of dizziness treatment and daily steroid for 5 days to help with possible underlying labyrinthitis or vestibular neuritis that could be contributing to your symptoms.      Follow-up with primary team in the next 5 to 7 days.  Signs of any improvement or concerns that may require ENT consult.  Work note provided.  Follow-up with primary care for any further requirements as necessary.

## 2025-05-22 NOTE — MEDICATION SCRIBE - ADMISSION MEDICATION HISTORY
Medication Scribe Admission Medication History    Admission medication history is complete. The information provided in this note is only as accurate as the sources available at the time of the update.    Information Source(s): Patient and CareEverywhere/SureScripts via in-person    Pertinent Information: No meds    Changes made to PTA medication list:  Added: None  Deleted: omeprazole  Changed: None    Allergies reviewed with patient and updates made in EHR: no    Medication History Completed By: YASMIN DOMINGUEZ 5/22/2025 5:15 PM    No outpatient medications have been marked as taking for the 5/22/25 encounter (Hospital Encounter).

## 2025-05-22 NOTE — Clinical Note
Valarie Talavera was seen and treated in our emergency department on 5/22/2025.  She may return to work on 05/26/2025.       If you have any questions or concerns, please don't hesitate to call.      Evelia Andrew MD

## 2025-05-22 NOTE — PROGRESS NOTES
Assessment & Plan     Dizziness  Brief loss of consciousness  Patient is a 34 year-old female presenting with severe vertiginous symptoms that are most apparent when in a fixed or supine position. HPI and exam are not consistent with simple BPPV. Positive Romberg and finger to abnormal finger-to-nose test. Advised immediate evaluation in ED given these abnormal neurologic findings for urgent brain/head imaging. Called [facility] and provided brief report to attending provider.     Izabella Sheppard is a 34 year old, presenting for the following health issues:  Dizziness      5/22/2025     1:58 PM   Additional Questions   Roomed by Lizeth   Accompanied by Children     Dizziness    History of Present Illness       Reason for visit:  Dizziness, vertigo, unbalanced  poor appitite  Symptom onset:  3-7 days ago   She is taking medications regularly.        Dizziness  Onset/Duration: about 1 week  Description:   Do you feel faint: YES  Does it feel like the surroundings (bed, room) are moving: YES  Unsteady/off balance: YES  Have you passed out or fallen: YES- last night passed out and threw up and passed out about 2 days ago  Intensity: severe  Progression of Symptoms: worsening and constant  Accompanying Signs & Symptoms:  Heart palpitations or chest pain: No  Nausea, vomiting: YES  Weakness or lack of coordination in arms or legs: No  Vision or speech changes: YES- spots in vision  Numbness or tingling: No  Ringing in ears (Tinnitus): No  Hearing Loss: No  History:   Head trauma/concussion history: YES- multiple concussions (roughly 7-8) but none in the last 10 years  Previous similar symptoms: No  Recent bleeding history: No  Any new medications (BP?): No  Precipitating factors:   Worse with activity: No  Worse with head movement: No  Alleviating factors:   Does staying in a fixed position give relief: no , staying in one position is when it gets worse  Therapies tried and outcome: None    Presents with concerns  "of progressive dizziness over the past week. Symptoms are worse when in a fixed position or when laying supine. Dizziness is described as room spinning. Symptoms are NOT provoked with head movement. Associated sensation of falling. Reports episodes of loss of balance and brief loss of consciousness lasting for a few seconds. Associated nausea and vomiting when dizziness symptoms are present. Reports eating and drinking without issue.     Denies headache, recent head injury, recent URI, recent travel, palpations, dysarthria, unilateral weakness, numbness or tingling in extremities.       Objective    /76 (Cuff Size: Adult Large)   Pulse 82   Temp 98  F (36.7  C)   Resp 13   Ht 1.727 m (5' 8\")   Wt 118.4 kg (261 lb)   LMP 05/10/2025 (Exact Date)   SpO2 98%   BMI 39.68 kg/m    Body mass index is 39.68 kg/m .  Physical Exam  Vitals reviewed.   Constitutional:       General: She is not in acute distress.     Appearance: Normal appearance. She is not ill-appearing.   HENT:      Head: Normocephalic and atraumatic.      Right Ear: Tympanic membrane, ear canal and external ear normal. Tympanic membrane is not erythematous, retracted or bulging.      Left Ear: Tympanic membrane, ear canal and external ear normal. Tympanic membrane is not erythematous, retracted or bulging.      Nose: No congestion or rhinorrhea.      Mouth/Throat:      Mouth: Mucous membranes are moist. No oral lesions.      Pharynx: Oropharynx is clear. No oropharyngeal exudate or posterior oropharyngeal erythema.   Eyes:      General: No visual field deficit.     Extraocular Movements: Extraocular movements intact.      Right eye: No nystagmus.      Left eye: No nystagmus.      Conjunctiva/sclera: Conjunctivae normal.      Right eye: Right conjunctiva is not injected. No exudate.     Left eye: Left conjunctiva is not injected. No exudate.     Pupils: Pupils are equal, round, and reactive to light.   Neck:      Vascular: No carotid bruit. "   Cardiovascular:      Rate and Rhythm: Normal rate and regular rhythm.      Heart sounds: Normal heart sounds, S1 normal and S2 normal. No murmur heard.  Pulmonary:      Effort: Pulmonary effort is normal. No respiratory distress.      Breath sounds: Normal breath sounds. No wheezing, rhonchi or rales.   Lymphadenopathy:      Cervical: No cervical adenopathy.   Skin:     General: Skin is warm and dry.      Capillary Refill: Capillary refill takes less than 2 seconds.      Findings: No lesion or rash.   Neurological:      Mental Status: She is alert and oriented to person, place, and time.      GCS: GCS eye subscore is 4. GCS verbal subscore is 5. GCS motor subscore is 6.      Cranial Nerves: No dysarthria or facial asymmetry.      Sensory: Sensation is intact. No sensory deficit.      Motor: Motor function is intact. No weakness.      Coordination: Romberg sign positive. Finger-Nose-Finger Test abnormal.      Gait: Gait is intact.      Comments: Intense vertiginous symptoms when in fixed position or supine. Attempted Mariela-Hallpike maneuver in clinic without tolerance.   Psychiatric:         Attention and Perception: Attention and perception normal.         Mood and Affect: Mood and affect normal.              Signed Electronically by: MARI Vazquez CNP

## 2025-07-12 ENCOUNTER — HEALTH MAINTENANCE LETTER (OUTPATIENT)
Age: 35
End: 2025-07-12